# Patient Record
Sex: MALE | Race: WHITE | Employment: OTHER | URBAN - NONMETROPOLITAN AREA
[De-identification: names, ages, dates, MRNs, and addresses within clinical notes are randomized per-mention and may not be internally consistent; named-entity substitution may affect disease eponyms.]

---

## 2017-01-13 ENCOUNTER — OFFICE VISIT (OUTPATIENT)
Dept: BARIATRICS/WEIGHT MGMT | Age: 62
End: 2017-01-13

## 2017-01-13 VITALS
HEART RATE: 64 BPM | HEIGHT: 70 IN | BODY MASS INDEX: 45.1 KG/M2 | WEIGHT: 315 LBS | RESPIRATION RATE: 18 BRPM | SYSTOLIC BLOOD PRESSURE: 134 MMHG | DIASTOLIC BLOOD PRESSURE: 72 MMHG | TEMPERATURE: 97.9 F

## 2017-01-13 DIAGNOSIS — E78.00 HYPERCHOLESTEREMIA: ICD-10-CM

## 2017-01-13 DIAGNOSIS — I10 ESSENTIAL HYPERTENSION: ICD-10-CM

## 2017-01-13 DIAGNOSIS — G47.30 SLEEP APNEA, UNSPECIFIED TYPE: ICD-10-CM

## 2017-01-13 DIAGNOSIS — E11.9 TYPE 2 DIABETES MELLITUS WITHOUT COMPLICATION, UNSPECIFIED LONG TERM INSULIN USE STATUS: ICD-10-CM

## 2017-01-13 DIAGNOSIS — E66.01 MORBID OBESITY DUE TO EXCESS CALORIES (HCC): Primary | ICD-10-CM

## 2017-01-13 DIAGNOSIS — E66.01 MORBID OBESITY WITH BMI OF 45.0-49.9, ADULT (HCC): Primary | ICD-10-CM

## 2017-01-13 PROCEDURE — 99213 OFFICE O/P EST LOW 20 MIN: CPT | Performed by: SURGERY

## 2017-01-13 ASSESSMENT — ENCOUNTER SYMPTOMS
GASTROINTESTINAL NEGATIVE: 1
CHOKING: 0
APNEA: 1
EYES NEGATIVE: 1
CHEST TIGHTNESS: 0
ALLERGIC/IMMUNOLOGIC NEGATIVE: 1

## 2017-01-17 DIAGNOSIS — E78.5 HYPERLIPIDEMIA, UNSPECIFIED HYPERLIPIDEMIA TYPE: ICD-10-CM

## 2017-01-17 DIAGNOSIS — I10 ESSENTIAL HYPERTENSION: Primary | ICD-10-CM

## 2017-01-17 DIAGNOSIS — E66.01 MORBID OBESITY, UNSPECIFIED OBESITY TYPE (HCC): ICD-10-CM

## 2017-02-06 ENCOUNTER — OFFICE VISIT (OUTPATIENT)
Dept: BARIATRICS/WEIGHT MGMT | Age: 62
End: 2017-02-06

## 2017-02-06 VITALS
HEIGHT: 71 IN | RESPIRATION RATE: 18 BRPM | TEMPERATURE: 98.1 F | DIASTOLIC BLOOD PRESSURE: 76 MMHG | HEART RATE: 72 BPM | BODY MASS INDEX: 44.1 KG/M2 | WEIGHT: 315 LBS | SYSTOLIC BLOOD PRESSURE: 140 MMHG

## 2017-02-06 DIAGNOSIS — E11.8 TYPE 2 DIABETES MELLITUS WITH COMPLICATION, UNSPECIFIED LONG TERM INSULIN USE STATUS: ICD-10-CM

## 2017-02-06 DIAGNOSIS — E66.9 OBESITY, UNSPECIFIED OBESITY SEVERITY, UNSPECIFIED OBESITY TYPE: Primary | ICD-10-CM

## 2017-02-06 DIAGNOSIS — R79.89 LOW VITAMIN D LEVEL: ICD-10-CM

## 2017-02-06 DIAGNOSIS — E66.01 MORBID OBESITY WITH BMI OF 50.0-59.9, ADULT (HCC): Primary | ICD-10-CM

## 2017-02-06 DIAGNOSIS — I10 ESSENTIAL HYPERTENSION: ICD-10-CM

## 2017-02-06 DIAGNOSIS — N18.9 CHRONIC KIDNEY DISEASE, UNSPECIFIED STAGE: ICD-10-CM

## 2017-02-06 DIAGNOSIS — E78.00 HYPERCHOLESTEREMIA: ICD-10-CM

## 2017-02-06 DIAGNOSIS — G47.30 SLEEP APNEA, UNSPECIFIED TYPE: ICD-10-CM

## 2017-02-06 PROCEDURE — 99213 OFFICE O/P EST LOW 20 MIN: CPT | Performed by: SURGERY

## 2017-02-06 RX ORDER — ONDANSETRON 4 MG/1
4 TABLET, FILM COATED ORAL EVERY 4 HOURS PRN
Qty: 30 TABLET | Refills: 2 | Status: ON HOLD | OUTPATIENT
Start: 2017-02-27 | End: 2020-09-08

## 2017-02-06 RX ORDER — OMEPRAZOLE 40 MG/1
40 CAPSULE, DELAYED RELEASE ORAL DAILY
Qty: 30 CAPSULE | Refills: 3 | Status: ON HOLD | OUTPATIENT
Start: 2017-02-27 | End: 2020-09-08

## 2017-02-06 RX ORDER — METOCLOPRAMIDE 10 MG/1
10 TABLET ORAL EVERY 6 HOURS PRN
Qty: 30 TABLET | Refills: 2 | Status: ON HOLD | OUTPATIENT
Start: 2017-02-27 | End: 2020-09-08

## 2017-02-06 ASSESSMENT — ENCOUNTER SYMPTOMS
EYES NEGATIVE: 1
GASTROINTESTINAL NEGATIVE: 1
APNEA: 1
CHEST TIGHTNESS: 0
CHOKING: 0
ALLERGIC/IMMUNOLOGIC NEGATIVE: 1

## 2017-02-07 ENCOUNTER — TELEPHONE (OUTPATIENT)
Age: 62
End: 2017-02-07

## 2017-02-07 RX ORDER — ERGOCALCIFEROL (VITAMIN D2) 1250 MCG
50000 CAPSULE ORAL WEEKLY
Qty: 8 CAPSULE | Refills: 0 | Status: ON HOLD | OUTPATIENT
Start: 2017-02-07 | End: 2020-09-08

## 2017-11-16 ENCOUNTER — HOSPITAL ENCOUNTER (OUTPATIENT)
Age: 62
Discharge: HOME | End: 2017-11-16
Payer: COMMERCIAL

## 2017-11-16 LAB
BACTERIA: ABNORMAL /HPF
BILIRUBIN URINE: NEGATIVE
BLOOD, URINE: ABNORMAL
CASTS 2: ABNORMAL /LPF
CASTS UA: ABNORMAL /LPF
CHARACTER, URINE: CLEAR
COLOR: YELLOW
CREATININE URINE: 16.7 MG/DL
CRYSTALS, UA: ABNORMAL
EPITHELIAL CELLS, UA: ABNORMAL /HPF
GLUCOSE URINE: NEGATIVE MG/DL
KETONES, URINE: NEGATIVE
LEUKOCYTE ESTERASE, URINE: NEGATIVE
MISCELLANEOUS 2: ABNORMAL
NITRITE, URINE: NEGATIVE
PH UA: 6.5
PROT/CREAT RATIO, UR: 5.98
PROTEIN UA: 100
PROTEIN, URINE: 99.9 MG/DL
RBC URINE: ABNORMAL /HPF
RENAL EPITHELIAL, UA: ABNORMAL
SPECIFIC GRAVITY, URINE: 1.01 (ref 1–1.03)
UROBILINOGEN, URINE: 0.2 EU/DL
WBC UA: ABNORMAL /HPF
YEAST: ABNORMAL

## 2017-11-16 PROCEDURE — 81001 URINALYSIS AUTO W/SCOPE: CPT

## 2017-11-16 PROCEDURE — 82570 ASSAY OF URINE CREATININE: CPT

## 2017-11-16 PROCEDURE — 84156 ASSAY OF PROTEIN URINE: CPT

## 2017-12-21 ENCOUNTER — HOSPITAL ENCOUNTER (OUTPATIENT)
Age: 62
Discharge: HOME | End: 2017-12-21
Payer: COMMERCIAL

## 2017-12-21 LAB
ALBUMIN SERPL-MCNC: 3.7 G/DL (ref 3.5–5.1)
ALP BLD-CCNC: 90 U/L (ref 38–126)
ALT SERPL-CCNC: 78 U/L (ref 11–66)
ANION GAP SERPL CALCULATED.3IONS-SCNC: 13 MEQ/L (ref 8–16)
AST SERPL-CCNC: 56 U/L (ref 5–40)
AVERAGE GLUCOSE: 135 MG/DL (ref 70–126)
BASOPHILS # BLD: 1 %
BASOPHILS ABSOLUTE: 0.1 THOU/MM3 (ref 0–0.1)
BILIRUB SERPL-MCNC: 0.4 MG/DL (ref 0.3–1.2)
BUN BLDV-MCNC: 22 MG/DL (ref 7–22)
CALCIUM SERPL-MCNC: 10.6 MG/DL (ref 8.5–10.5)
CHLORIDE BLD-SCNC: 103 MEQ/L (ref 98–111)
CHOLESTEROL, TOTAL: 162 MG/DL (ref 100–199)
CO2: 27 MEQ/L (ref 23–33)
CREAT SERPL-MCNC: 1 MG/DL (ref 0.4–1.2)
EOSINOPHIL # BLD: 6.5 %
EOSINOPHILS ABSOLUTE: 0.4 THOU/MM3 (ref 0–0.4)
GFR SERPL CREATININE-BSD FRML MDRD: 76 ML/MIN/1.73M2
GLUCOSE BLD-MCNC: 169 MG/DL (ref 70–108)
HBA1C MFR BLD: 6.5 % (ref 4.4–6.4)
HCT VFR BLD CALC: 48.8 % (ref 42–52)
HDLC SERPL-MCNC: 38 MG/DL
HEMOGLOBIN: 16.7 GM/DL (ref 14–18)
LDL CHOLESTEROL CALCULATED: 102 MG/DL
LYMPHOCYTES # BLD: 31.8 %
LYMPHOCYTES ABSOLUTE: 1.7 THOU/MM3 (ref 1–4.8)
MCH RBC QN AUTO: 30.1 PG (ref 27–31)
MCHC RBC AUTO-ENTMCNC: 34.3 GM/DL (ref 33–37)
MCV RBC AUTO: 87.9 FL (ref 80–94)
MONOCYTES # BLD: 13.7 %
MONOCYTES ABSOLUTE: 0.8 THOU/MM3 (ref 0.4–1.3)
NUCLEATED RED BLOOD CELLS: 0 /100 WBC
PDW BLD-RTO: 14 % (ref 11.5–14.5)
PLATELET # BLD: 190 THOU/MM3 (ref 130–400)
PMV BLD AUTO: 9.2 MCM (ref 7.4–10.4)
POTASSIUM SERPL-SCNC: 4.4 MEQ/L (ref 3.5–5.2)
PROSTATE SPECIFIC ANTIGEN: 0.61 NG/ML (ref 0–1)
RBC # BLD: 5.55 MILL/MM3 (ref 4.7–6.1)
SEG NEUTROPHILS: 47 %
SEGMENTED NEUTROPHILS ABSOLUTE COUNT: 2.6 THOU/MM3 (ref 1.8–7.7)
SODIUM BLD-SCNC: 143 MEQ/L (ref 135–145)
TOTAL PROTEIN: 6.9 G/DL (ref 6.1–8)
TRIGL SERPL-MCNC: 110 MG/DL (ref 0–199)
WBC # BLD: 5.5 THOU/MM3 (ref 4.8–10.8)

## 2017-12-21 PROCEDURE — G0103 PSA SCREENING: HCPCS

## 2017-12-21 PROCEDURE — 85025 COMPLETE CBC W/AUTO DIFF WBC: CPT

## 2017-12-21 PROCEDURE — 80061 LIPID PANEL: CPT

## 2017-12-21 PROCEDURE — 80053 COMPREHEN METABOLIC PANEL: CPT

## 2017-12-21 PROCEDURE — 83036 HEMOGLOBIN GLYCOSYLATED A1C: CPT

## 2017-12-21 PROCEDURE — 36415 COLL VENOUS BLD VENIPUNCTURE: CPT

## 2018-02-06 ENCOUNTER — HOSPITAL ENCOUNTER (OUTPATIENT)
Age: 63
Discharge: HOME | End: 2018-02-06
Payer: COMMERCIAL

## 2018-02-06 LAB
ANION GAP SERPL CALCULATED.3IONS-SCNC: 14 MEQ/L (ref 8–16)
BACTERIA: ABNORMAL /HPF
BILIRUBIN URINE: NEGATIVE
BLOOD, URINE: ABNORMAL
BUN BLDV-MCNC: 20 MG/DL (ref 7–22)
CALCIUM SERPL-MCNC: 10.4 MG/DL (ref 8.5–10.5)
CASTS 2: ABNORMAL /LPF
CASTS UA: ABNORMAL /LPF
CHARACTER, URINE: CLEAR
CHLORIDE BLD-SCNC: 100 MEQ/L (ref 98–111)
CO2: 27 MEQ/L (ref 23–33)
COLOR: YELLOW
CREAT SERPL-MCNC: 1 MG/DL (ref 0.4–1.2)
CREATININE URINE: 24.3 MG/DL
CRYSTALS, UA: ABNORMAL
EPITHELIAL CELLS, UA: ABNORMAL /HPF
GFR SERPL CREATININE-BSD FRML MDRD: 76 ML/MIN/1.73M2
GLUCOSE BLD-MCNC: 174 MG/DL (ref 70–108)
GLUCOSE URINE: NEGATIVE MG/DL
HCT VFR BLD CALC: 47.5 % (ref 42–52)
HEMOGLOBIN: 16.3 GM/DL (ref 14–18)
KETONES, URINE: NEGATIVE
LEUKOCYTE ESTERASE, URINE: NEGATIVE
MCH RBC QN AUTO: 30.2 PG (ref 27–31)
MCHC RBC AUTO-ENTMCNC: 34.3 GM/DL (ref 33–37)
MCV RBC AUTO: 88.3 FL (ref 80–94)
MISCELLANEOUS 2: ABNORMAL
NITRITE, URINE: NEGATIVE
PDW BLD-RTO: 13.7 % (ref 11.5–14.5)
PH UA: 6
PLATELET # BLD: 219 THOU/MM3 (ref 130–400)
PMV BLD AUTO: 9.4 FL (ref 7.4–10.4)
POTASSIUM SERPL-SCNC: 4.4 MEQ/L (ref 3.5–5.2)
PROT/CREAT RATIO, UR: 2.92
PROTEIN UA: 100
PROTEIN, URINE: 70.9 MG/DL
RBC # BLD: 5.38 MILL/MM3 (ref 4.7–6.1)
RBC URINE: ABNORMAL /HPF
RENAL EPITHELIAL, UA: ABNORMAL
SODIUM BLD-SCNC: 141 MEQ/L (ref 135–145)
SPECIFIC GRAVITY, URINE: 1.01 (ref 1–1.03)
UROBILINOGEN, URINE: 0.2 EU/DL
WBC # BLD: 8.9 THOU/MM3 (ref 4.8–10.8)
WBC UA: ABNORMAL /HPF
YEAST: ABNORMAL

## 2018-02-06 PROCEDURE — 81001 URINALYSIS AUTO W/SCOPE: CPT

## 2018-02-06 PROCEDURE — 80048 BASIC METABOLIC PNL TOTAL CA: CPT

## 2018-02-06 PROCEDURE — 85027 COMPLETE CBC AUTOMATED: CPT

## 2018-02-06 PROCEDURE — 84156 ASSAY OF PROTEIN URINE: CPT

## 2018-02-06 PROCEDURE — 36415 COLL VENOUS BLD VENIPUNCTURE: CPT

## 2018-02-06 PROCEDURE — 82570 ASSAY OF URINE CREATININE: CPT

## 2018-03-20 ENCOUNTER — HOSPITAL ENCOUNTER (OUTPATIENT)
Age: 63
Discharge: HOME | End: 2018-03-20
Payer: COMMERCIAL

## 2018-03-20 LAB
ANION GAP SERPL CALCULATED.3IONS-SCNC: 14 MEQ/L (ref 8–16)
BACTERIA: ABNORMAL /HPF
BASOPHILS # BLD: 1.3 %
BASOPHILS ABSOLUTE: 0.1 THOU/MM3 (ref 0–0.1)
BILIRUBIN URINE: NEGATIVE
BLOOD, URINE: ABNORMAL
BUN BLDV-MCNC: 24 MG/DL (ref 7–22)
CALCIUM SERPL-MCNC: 10.9 MG/DL (ref 8.5–10.5)
CASTS 2: ABNORMAL /LPF
CASTS UA: ABNORMAL /LPF
CHARACTER, URINE: CLEAR
CHLORIDE BLD-SCNC: 101 MEQ/L (ref 98–111)
CO2: 27 MEQ/L (ref 23–33)
COLOR: YELLOW
CREAT SERPL-MCNC: 0.9 MG/DL (ref 0.4–1.2)
CREATININE URINE: 18.7 MG/DL
CRYSTALS, UA: ABNORMAL
EOSINOPHIL # BLD: 3.9 %
EOSINOPHILS ABSOLUTE: 0.3 THOU/MM3 (ref 0–0.4)
EPITHELIAL CELLS, UA: ABNORMAL /HPF
GFR SERPL CREATININE-BSD FRML MDRD: 86 ML/MIN/1.73M2
GLUCOSE BLD-MCNC: 163 MG/DL (ref 70–108)
GLUCOSE URINE: NEGATIVE MG/DL
HCT VFR BLD CALC: 49.3 % (ref 42–52)
HEMOGLOBIN: 16.9 GM/DL (ref 14–18)
KETONES, URINE: NEGATIVE
LEUKOCYTE ESTERASE, URINE: NEGATIVE
LYMPHOCYTES # BLD: 24.3 %
LYMPHOCYTES ABSOLUTE: 2.1 THOU/MM3 (ref 1–4.8)
MCH RBC QN AUTO: 30 PG (ref 27–31)
MCHC RBC AUTO-ENTMCNC: 34.3 GM/DL (ref 33–37)
MCV RBC AUTO: 87.3 FL (ref 80–94)
MISCELLANEOUS 2: ABNORMAL
MONOCYTES # BLD: 12.3 %
MONOCYTES ABSOLUTE: 1.1 THOU/MM3 (ref 0.4–1.3)
NITRITE, URINE: NEGATIVE
NUCLEATED RED BLOOD CELLS: 0 /100 WBC
PDW BLD-RTO: 13.8 % (ref 11.5–14.5)
PH UA: 6.5
PLATELET # BLD: 236 THOU/MM3 (ref 130–400)
PMV BLD AUTO: 9.6 FL (ref 7.4–10.4)
POTASSIUM SERPL-SCNC: 4.3 MEQ/L (ref 3.5–5.2)
PROT/CREAT RATIO, UR: 3.03
PROTEIN UA: 100
PROTEIN, URINE: 56.6 MG/DL
RBC # BLD: 5.65 MILL/MM3 (ref 4.7–6.1)
RBC URINE: ABNORMAL /HPF
RENAL EPITHELIAL, UA: ABNORMAL
SEG NEUTROPHILS: 58.2 %
SEGMENTED NEUTROPHILS ABSOLUTE COUNT: 5.1 THOU/MM3 (ref 1.8–7.7)
SODIUM BLD-SCNC: 142 MEQ/L (ref 135–145)
SPECIFIC GRAVITY, URINE: 1.01 (ref 1–1.03)
UROBILINOGEN, URINE: 0.2 EU/DL
WBC # BLD: 8.7 THOU/MM3 (ref 4.8–10.8)
WBC UA: ABNORMAL /HPF
YEAST: ABNORMAL

## 2018-03-20 PROCEDURE — 36415 COLL VENOUS BLD VENIPUNCTURE: CPT

## 2018-03-20 PROCEDURE — 81001 URINALYSIS AUTO W/SCOPE: CPT

## 2018-03-20 PROCEDURE — 85025 COMPLETE CBC W/AUTO DIFF WBC: CPT

## 2018-03-20 PROCEDURE — 84156 ASSAY OF PROTEIN URINE: CPT

## 2018-03-20 PROCEDURE — 82570 ASSAY OF URINE CREATININE: CPT

## 2018-03-20 PROCEDURE — 80048 BASIC METABOLIC PNL TOTAL CA: CPT

## 2019-03-26 ENCOUNTER — HOSPITAL ENCOUNTER (OUTPATIENT)
Age: 64
Discharge: HOME | End: 2019-03-26
Payer: COMMERCIAL

## 2019-03-26 LAB
ANION GAP SERPL CALCULATED.3IONS-SCNC: 11 MEQ/L (ref 8–16)
BACTERIA: ABNORMAL /HPF
BASOPHILS # BLD: 0.7 %
BASOPHILS ABSOLUTE: 0.1 THOU/MM3 (ref 0–0.1)
BILIRUBIN URINE: NEGATIVE
BLOOD, URINE: NEGATIVE
BUN BLDV-MCNC: 23 MG/DL (ref 7–22)
CALCIUM SERPL-MCNC: 10.5 MG/DL (ref 8.5–10.5)
CASTS 2: ABNORMAL /LPF
CASTS UA: ABNORMAL /LPF
CHARACTER, URINE: CLEAR
CHLORIDE BLD-SCNC: 104 MEQ/L (ref 98–111)
CO2: 27 MEQ/L (ref 23–33)
COLOR: YELLOW
CREAT SERPL-MCNC: 0.9 MG/DL (ref 0.4–1.2)
CRYSTALS, UA: ABNORMAL
EOSINOPHIL # BLD: 3.7 %
EOSINOPHILS ABSOLUTE: 0.3 THOU/MM3 (ref 0–0.4)
EPITHELIAL CELLS, UA: ABNORMAL /HPF
ERYTHROCYTE [DISTWIDTH] IN BLOOD BY AUTOMATED COUNT: 13.6 % (ref 11.5–14.5)
ERYTHROCYTE [DISTWIDTH] IN BLOOD BY AUTOMATED COUNT: 43.8 FL (ref 35–45)
GFR SERPL CREATININE-BSD FRML MDRD: 85 ML/MIN/1.73M2
GLUCOSE BLD-MCNC: 156 MG/DL (ref 70–108)
GLUCOSE URINE: NEGATIVE MG/DL
HCT VFR BLD CALC: 50.9 % (ref 42–52)
HEMOGLOBIN: 16.8 GM/DL (ref 14–18)
IMMATURE GRANS (ABS): 0.12 THOU/MM3 (ref 0–0.07)
IMMATURE GRANULOCYTES: 1.3 %
KETONES, URINE: NEGATIVE
LEUKOCYTE ESTERASE, URINE: NEGATIVE
LYMPHOCYTES # BLD: 25.1 %
LYMPHOCYTES ABSOLUTE: 2.4 THOU/MM3 (ref 1–4.8)
MCH RBC QN AUTO: 29.4 PG (ref 26–33)
MCHC RBC AUTO-ENTMCNC: 33 GM/DL (ref 32.2–35.5)
MCV RBC AUTO: 89.1 FL (ref 80–94)
MISCELLANEOUS 2: ABNORMAL
MONOCYTES # BLD: 9.4 %
MONOCYTES ABSOLUTE: 0.9 THOU/MM3 (ref 0.4–1.3)
NITRITE, URINE: NEGATIVE
NUCLEATED RED BLOOD CELLS: 0 /100 WBC
PH UA: 6 (ref 5–9)
PLATELET # BLD: 277 THOU/MM3 (ref 130–400)
PMV BLD AUTO: 10.8 FL (ref 9.4–12.4)
POTASSIUM SERPL-SCNC: 4.4 MEQ/L (ref 3.5–5.2)
PROTEIN UA: 100
RBC # BLD: 5.71 MILL/MM3 (ref 4.7–6.1)
RBC URINE: ABNORMAL /HPF
RENAL EPITHELIAL, UA: ABNORMAL
SEG NEUTROPHILS: 59.8 %
SEGMENTED NEUTROPHILS ABSOLUTE COUNT: 5.6 THOU/MM3 (ref 1.8–7.7)
SODIUM BLD-SCNC: 142 MEQ/L (ref 135–145)
SPECIFIC GRAVITY, URINE: 1.01 (ref 1–1.03)
UROBILINOGEN, URINE: 0.2 EU/DL (ref 0–1)
WBC # BLD: 9.4 THOU/MM3 (ref 4.8–10.8)
WBC UA: ABNORMAL /HPF
YEAST: ABNORMAL

## 2019-03-26 PROCEDURE — 82570 ASSAY OF URINE CREATININE: CPT

## 2019-03-26 PROCEDURE — 80048 BASIC METABOLIC PNL TOTAL CA: CPT

## 2019-03-26 PROCEDURE — 84156 ASSAY OF PROTEIN URINE: CPT

## 2019-03-26 PROCEDURE — 85025 COMPLETE CBC W/AUTO DIFF WBC: CPT

## 2019-03-26 PROCEDURE — 36415 COLL VENOUS BLD VENIPUNCTURE: CPT

## 2019-03-26 PROCEDURE — 81001 URINALYSIS AUTO W/SCOPE: CPT

## 2019-03-27 LAB
CREATININE URINE: 29.2 MG/DL
PROT/CREAT RATIO, UR: 2.49
PROTEIN, URINE: 72.7 MG/DL

## 2019-05-02 ENCOUNTER — HOSPITAL ENCOUNTER (OUTPATIENT)
Age: 64
Discharge: HOME | End: 2019-05-02
Payer: COMMERCIAL

## 2019-05-02 LAB — URIC ACID: 9.2 MG/DL (ref 3.7–7)

## 2019-05-02 PROCEDURE — 84550 ASSAY OF BLOOD/URIC ACID: CPT

## 2019-05-02 PROCEDURE — 36415 COLL VENOUS BLD VENIPUNCTURE: CPT

## 2019-08-10 ENCOUNTER — APPOINTMENT (OUTPATIENT)
Dept: GENERAL RADIOLOGY | Age: 64
End: 2019-08-10
Payer: COMMERCIAL

## 2019-08-10 ENCOUNTER — HOSPITAL ENCOUNTER (EMERGENCY)
Age: 64
Discharge: HOME | End: 2019-08-10
Attending: EMERGENCY MEDICINE
Payer: COMMERCIAL

## 2019-08-10 VITALS
SYSTOLIC BLOOD PRESSURE: 183 MMHG | WEIGHT: 315 LBS | HEIGHT: 71 IN | RESPIRATION RATE: 20 BRPM | OXYGEN SATURATION: 92 % | TEMPERATURE: 97.7 F | BODY MASS INDEX: 44.1 KG/M2 | DIASTOLIC BLOOD PRESSURE: 96 MMHG | HEART RATE: 74 BPM

## 2019-08-10 DIAGNOSIS — B34.9 VIRAL ILLNESS: Primary | ICD-10-CM

## 2019-08-10 DIAGNOSIS — R03.0 ELEVATED BLOOD PRESSURE READING: ICD-10-CM

## 2019-08-10 LAB
ALBUMIN SERPL-MCNC: 4 G/DL (ref 3.5–5.1)
ALP BLD-CCNC: 98 U/L (ref 38–126)
ALT SERPL-CCNC: 60 U/L (ref 11–66)
ANION GAP SERPL CALCULATED.3IONS-SCNC: 12 MEQ/L (ref 8–16)
APTT: 35.6 SECONDS (ref 22–38)
AST SERPL-CCNC: 54 U/L (ref 5–40)
BACTERIA: ABNORMAL /HPF
BASOPHILS # BLD: 0.6 %
BASOPHILS ABSOLUTE: 0 THOU/MM3 (ref 0–0.1)
BILIRUB SERPL-MCNC: 0.7 MG/DL (ref 0.3–1.2)
BILIRUBIN URINE: NEGATIVE
BLOOD, URINE: ABNORMAL
BUN BLDV-MCNC: 17 MG/DL (ref 7–22)
C-REACTIVE PROTEIN: 2.77 MG/DL (ref 0–1)
CALCIUM SERPL-MCNC: 10.9 MG/DL (ref 8.5–10.5)
CASTS 2: ABNORMAL /LPF
CASTS UA: ABNORMAL /LPF
CHARACTER, URINE: CLEAR
CHLORIDE BLD-SCNC: 100 MEQ/L (ref 98–111)
CO2: 24 MEQ/L (ref 23–33)
COLOR: YELLOW
CREAT SERPL-MCNC: 1 MG/DL (ref 0.4–1.2)
CRYSTALS, UA: ABNORMAL
EOSINOPHIL # BLD: 0.8 %
EOSINOPHILS ABSOLUTE: 0.1 THOU/MM3 (ref 0–0.4)
EPITHELIAL CELLS, UA: ABNORMAL /HPF
ERYTHROCYTE [DISTWIDTH] IN BLOOD BY AUTOMATED COUNT: 13.1 % (ref 11.5–14.5)
ERYTHROCYTE [DISTWIDTH] IN BLOOD BY AUTOMATED COUNT: 41.3 FL (ref 35–45)
GFR SERPL CREATININE-BSD FRML MDRD: 76 ML/MIN/1.73M2
GLUCOSE BLD-MCNC: 194 MG/DL (ref 70–108)
GLUCOSE URINE: NEGATIVE MG/DL
HCT VFR BLD CALC: 46.5 % (ref 42–52)
HEMOGLOBIN: 16 GM/DL (ref 14–18)
IMMATURE GRANS (ABS): 0.08 THOU/MM3 (ref 0–0.07)
IMMATURE GRANULOCYTES: 1.2 %
INR BLD: 0.98 (ref 0.85–1.13)
KETONES, URINE: NEGATIVE
LEUKOCYTE ESTERASE, URINE: NEGATIVE
LYMPHOCYTES # BLD: 18 %
LYMPHOCYTES ABSOLUTE: 1.2 THOU/MM3 (ref 1–4.8)
MCH RBC QN AUTO: 29.9 PG (ref 26–33)
MCHC RBC AUTO-ENTMCNC: 34.4 GM/DL (ref 32.2–35.5)
MCV RBC AUTO: 86.9 FL (ref 80–94)
MISCELLANEOUS 2: ABNORMAL
MONOCYTES # BLD: 13.8 %
MONOCYTES ABSOLUTE: 0.9 THOU/MM3 (ref 0.4–1.3)
NITRITE, URINE: NEGATIVE
NUCLEATED RED BLOOD CELLS: 0 /100 WBC
OSMOLALITY CALCULATION: 278.8 MOSMOL/KG (ref 275–300)
PH UA: 6.5 (ref 5–9)
PLATELET # BLD: 166 THOU/MM3 (ref 130–400)
PMV BLD AUTO: 10 FL (ref 9.4–12.4)
POTASSIUM SERPL-SCNC: 4.1 MEQ/L (ref 3.5–5.2)
PRO-BNP: 90.6 PG/ML (ref 0–900)
PROCALCITONIN: 0.54 NG/ML (ref 0.01–0.09)
PROTEIN UA: 300
RBC # BLD: 5.35 MILL/MM3 (ref 4.7–6.1)
RBC URINE: ABNORMAL /HPF
RENAL EPITHELIAL, UA: ABNORMAL
SEDIMENTATION RATE, ERYTHROCYTE: 6 MM/HR (ref 0–10)
SEG NEUTROPHILS: 65.6 %
SEGMENTED NEUTROPHILS ABSOLUTE COUNT: 4.3 THOU/MM3 (ref 1.8–7.7)
SODIUM BLD-SCNC: 136 MEQ/L (ref 135–145)
SPECIFIC GRAVITY, URINE: 1.02 (ref 1–1.03)
TOTAL CK: 189 U/L (ref 55–170)
TOTAL PROTEIN: 6.3 G/DL (ref 6.1–8)
TROPONIN T: < 0.01 NG/ML
UROBILINOGEN, URINE: 0.2 EU/DL (ref 0–1)
WBC # BLD: 6.5 THOU/MM3 (ref 4.8–10.8)
WBC UA: ABNORMAL /HPF
YEAST: ABNORMAL

## 2019-08-10 PROCEDURE — 86140 C-REACTIVE PROTEIN: CPT

## 2019-08-10 PROCEDURE — 84145 PROCALCITONIN (PCT): CPT

## 2019-08-10 PROCEDURE — 85730 THROMBOPLASTIN TIME PARTIAL: CPT

## 2019-08-10 PROCEDURE — 81001 URINALYSIS AUTO W/SCOPE: CPT

## 2019-08-10 PROCEDURE — 6370000000 HC RX 637 (ALT 250 FOR IP): Performed by: EMERGENCY MEDICINE

## 2019-08-10 PROCEDURE — 71046 X-RAY EXAM CHEST 2 VIEWS: CPT

## 2019-08-10 PROCEDURE — 80053 COMPREHEN METABOLIC PANEL: CPT

## 2019-08-10 PROCEDURE — 84484 ASSAY OF TROPONIN QUANT: CPT

## 2019-08-10 PROCEDURE — 94640 AIRWAY INHALATION TREATMENT: CPT

## 2019-08-10 PROCEDURE — 82550 ASSAY OF CK (CPK): CPT

## 2019-08-10 PROCEDURE — 87040 BLOOD CULTURE FOR BACTERIA: CPT

## 2019-08-10 PROCEDURE — 85651 RBC SED RATE NONAUTOMATED: CPT

## 2019-08-10 PROCEDURE — 85610 PROTHROMBIN TIME: CPT

## 2019-08-10 PROCEDURE — 83880 ASSAY OF NATRIURETIC PEPTIDE: CPT

## 2019-08-10 PROCEDURE — 85025 COMPLETE CBC W/AUTO DIFF WBC: CPT

## 2019-08-10 PROCEDURE — 99284 EMERGENCY DEPT VISIT MOD MDM: CPT

## 2019-08-10 PROCEDURE — 36415 COLL VENOUS BLD VENIPUNCTURE: CPT

## 2019-08-10 PROCEDURE — 2709999900 HC NON-CHARGEABLE SUPPLY

## 2019-08-10 RX ORDER — IPRATROPIUM BROMIDE AND ALBUTEROL SULFATE 2.5; .5 MG/3ML; MG/3ML
1 SOLUTION RESPIRATORY (INHALATION) ONCE
Status: COMPLETED | OUTPATIENT
Start: 2019-08-10 | End: 2019-08-10

## 2019-08-10 RX ADMIN — IPRATROPIUM BROMIDE AND ALBUTEROL SULFATE 1 AMPULE: .5; 3 SOLUTION RESPIRATORY (INHALATION) at 04:01

## 2019-08-10 ASSESSMENT — ENCOUNTER SYMPTOMS
COUGH: 0
VOMITING: 0
WHEEZING: 0
EYE DISCHARGE: 0
DIARRHEA: 0
BACK PAIN: 1
SORE THROAT: 0
ABDOMINAL PAIN: 0
SHORTNESS OF BREATH: 0
NAUSEA: 0
EYE REDNESS: 0
RHINORRHEA: 0

## 2019-08-10 ASSESSMENT — PAIN DESCRIPTION - PAIN TYPE: TYPE: ACUTE PAIN

## 2019-08-10 ASSESSMENT — PAIN DESCRIPTION - LOCATION: LOCATION: BACK;NECK

## 2019-08-10 ASSESSMENT — PAIN DESCRIPTION - ORIENTATION: ORIENTATION: MID

## 2019-08-10 ASSESSMENT — PAIN SCALES - GENERAL: PAINLEVEL_OUTOF10: 3

## 2019-08-10 NOTE — ED NOTES
Pt. Resting in bed with even and unlabored respirations. Pt. States pain is a 2/10 in his back at this time. Pt. States the breathing treatment has helped. Pt. Updated about plan of care and treatment. Family at bedside. Pt. Has no further concerns, questions or needs at this time. Call light within reach.         Pasha Valadez RN  08/10/19 1305

## 2019-08-10 NOTE — ED PROVIDER NOTES
[facility]  eMERGENCY dEPARTMENT eNCOUnter          CHIEF COMPLAINT       Chief Complaint   Patient presents with    Fatigue    Headache    Back Pain       Nurses Notes reviewed and I agreeexcept as noted in the HPI. HISTORY OF PRESENT ILLNESS    Laila Shown is a 59 y.o. male who presents to Emergency Department with multiple complaints. The patient has a history of CAD, diabetes, hypertension, hyperlipidemia, CHF, CKD, sleep apnea, and morbid obesity. Patient has experienced headache and neck pain for the past 3 weeks. He denies head or neck injury. Over the past 24 hours, patient has developed chest pain, lower back pain, hot flashes, and chills which prompted his visit here. He denies shortness of breath. Patient does not appear to be in any distress at this time. He denies nausea, vomiting, or abdominal pain. There are no other complaints or symptoms. REVIEW OF SYSTEMS     Review of Systems   Constitutional: Positive for chills. Negative for appetite change, fatigue and fever. HENT: Negative for congestion, ear pain, rhinorrhea and sore throat. Eyes: Negative for discharge, redness and visual disturbance. Respiratory: Negative for cough, shortness of breath and wheezing. Cardiovascular: Positive for chest pain. Negative for palpitations and leg swelling. Gastrointestinal: Negative for abdominal pain, diarrhea, nausea and vomiting. Genitourinary: Negative for decreased urine volume, difficulty urinating, dysuria and hematuria. Musculoskeletal: Positive for back pain and neck pain. Negative for arthralgias and joint swelling. Skin: Negative for pallor and rash. Allergic/Immunologic: Negative for environmental allergies. Neurological: Negative for dizziness, syncope, weakness, light-headedness, numbness and headaches. Hematological: Negative for adenopathy. Psychiatric/Behavioral: Negative for confusion and suicidal ideas.  The patient is not nervous/anxious. PAST MEDICAL HISTORY    has a past medical history of Arthritis, CAD (coronary artery disease), CHF (congestive heart failure) ([facility] [address] Chronic kidney disease, Diabetes mellitus ([facility] [address] Hyperlipidemia, Hypertension, Lower extremity edema, Membranous nephropathy determined by biopsy, Movement disorder, Obesity, Obstructive sleep apnea, Pneumonia, and SOB (shortness of breath). SURGICAL HISTORY      has a past surgical history that includes Cholecystectomy (1980'S); Umbilical hernia repair (10/04/2016); Colonoscopy (2016); joint replacement (Bilateral, 06/03/2014); eye surgery (Left, 12/2016); and knee surgery. CURRENT MEDICATIONS       Discharge Medication List as of 8/10/2019  5:46 AM      CONTINUE these medications which have NOT CHANGED    Details   ergocalciferol (DRISDOL) 36679 UNITS capsule Take 1 capsule by mouth once a week for 8 doses, Disp-8 capsule, R-0      glipiZIDE (GLUCOTROL) 5 MG tablet Take 10 mg by mouth 2 times daily (before meals)      metoclopramide (REGLAN) 10 MG tablet Take 1 tablet by mouth every 6 hours as needed (nausea/ bloating), Disp-30 tablet, R-2      ondansetron (ZOFRAN) 4 MG tablet Take 1 tablet by mouth every 4 hours as needed for Nausea or Vomiting, Disp-30 tablet, R-2      omeprazole (PRILOSEC) 40 MG delayed release capsule Take 1 capsule by mouth daily Open capsule and take in liquid, Disp-30 capsule, R-3      aspirin 81 MG chewable tablet Take 81 mg by mouth daily      pravastatin (PRAVACHOL) 10 MG tablet Take 10 mg by mouth nightly. bumetanide (BUMEX) 2 MG tablet Take 2 mg by mouth 2 times daily. losartan (COZAAR) 100 MG tablet Take 100 mg by mouth daily. potassium chloride SA (K-DUR;KLOR-CON) 20 MEQ tablet 2 tabs po twice daily      metoprolol (LOPRESSOR) 25 MG tablet Take 25 mg by mouth 2 times daily.                ALLERGIES     is allergic to bactrim [sulfamethoxazole-trimethoprim]; celebrex [celecoxib]; diclofenac; dolobid Casts UA 0-4 HYALINE NONE SEEN /lpf    Crystals NONE SEEN NONE SEEN    Renal Epithelial, Urine NONE SEEN NONE SEEN    Yeast, UA NONE SEEN NONE SEEN    CASTS 2 NONE SEEN NONE SEEN /lpf    MISCELLANEOUS 2 NONE SEEN        EMERGENCY DEPARTMENT COURSE:   Vitals:    Vitals:    08/10/19 0309 08/10/19 0313 08/10/19 0401 08/10/19 0437   BP:  (!) 184/94  (!) 183/96   Pulse: 73   74   Resp: 18   20   Temp: 97.7 °F (36.5 °C)      TempSrc: Oral      SpO2: 94%  94% 92%   Weight: (!) 380 lb (172.4 kg)      Height: 5' 11\" (1.803 m)        0322     Labs and imaging ordered. Duoneb ordered    MedicalDecision Making    He has wheezing. He is given Duoneb. Labs are reviewed, other than CRP 2.77, otherwise reassuring including normal WBC and negative Troponin, Procalcitonin 0.54. Chest X-ray has no acute changes. His history, exam and ED workups are consistent with viral illness and he seems at recovery stage. I suggest supportive care with home Albuterol (He has Albuterol Neb at home). Discharged with PCP follow up in one week. CRITICAL CARE:   None     CONSULTS:  None    PROCEDURES:  None    FINAL IMPRESSION      1. Viral illness    2. Elevated blood pressure reading          DISPOSITION/PLAN   Home    PATIENT REFERRED TO:  Blank Villagran MD  [address] [place] [address]  [phone]    In 1 week        DISCHARGE MEDICATIONS:  Discharge Medication List as of 8/10/2019  5:46 AM          (Please note that portions of this note were completed with a voice recognition program.  Efforts were made to edit the dictations but occasionally words aremis-transcribed.)    Scribe:  Marcella Blunt 8/10/19 3:38 AM Scribing for and in the presence of No att. providers found  '.     Signed by: Karolyn Murphy, 08/10/19 6:51 PM    Provider:  I personally performed the services described in the documentation, reviewed and edited the documentation which was dictated to the scribe in my presence, and it accurately

## 2019-08-15 LAB — BLOOD CULTURE, ROUTINE: NORMAL

## 2019-10-15 ENCOUNTER — HOSPITAL ENCOUNTER (OUTPATIENT)
Age: 64
Discharge: HOME | End: 2019-10-15
Payer: COMMERCIAL

## 2019-10-15 LAB
ALT SERPL-CCNC: 56 U/L (ref 11–66)
AST SERPL-CCNC: 37 U/L (ref 5–40)
URIC ACID: 7 MG/DL (ref 3.7–7)

## 2019-10-15 PROCEDURE — 84450 TRANSFERASE (AST) (SGOT): CPT

## 2019-10-15 PROCEDURE — 84550 ASSAY OF BLOOD/URIC ACID: CPT

## 2019-10-15 PROCEDURE — 36415 COLL VENOUS BLD VENIPUNCTURE: CPT

## 2019-10-15 PROCEDURE — 84460 ALANINE AMINO (ALT) (SGPT): CPT

## 2019-12-06 ENCOUNTER — HOSPITAL ENCOUNTER (OUTPATIENT)
Age: 64
Discharge: HOME | End: 2019-12-06
Payer: COMMERCIAL

## 2019-12-06 LAB
ALBUMIN SERPL-MCNC: 4.1 G/DL (ref 3.5–5.1)
ALP BLD-CCNC: 87 U/L (ref 38–126)
ALT SERPL-CCNC: 70 U/L (ref 11–66)
ANION GAP SERPL CALCULATED.3IONS-SCNC: 16 MEQ/L (ref 8–16)
AST SERPL-CCNC: 62 U/L (ref 5–40)
AVERAGE GLUCOSE: 147 MG/DL (ref 70–126)
BACTERIA: ABNORMAL /HPF
BASOPHILS # BLD: 1 %
BASOPHILS ABSOLUTE: 0.1 THOU/MM3 (ref 0–0.1)
BILIRUB SERPL-MCNC: 0.7 MG/DL (ref 0.3–1.2)
BILIRUBIN URINE: NEGATIVE
BLOOD, URINE: NEGATIVE
BUN BLDV-MCNC: 22 MG/DL (ref 7–22)
CALCIUM SERPL-MCNC: 11 MG/DL (ref 8.5–10.5)
CASTS 2: ABNORMAL /LPF
CASTS UA: ABNORMAL /LPF
CHARACTER, URINE: CLEAR
CHLORIDE BLD-SCNC: 101 MEQ/L (ref 98–111)
CHOLESTEROL, TOTAL: 192 MG/DL (ref 100–199)
CO2: 24 MEQ/L (ref 23–33)
COLOR: YELLOW
CREAT SERPL-MCNC: 0.9 MG/DL (ref 0.4–1.2)
CREATININE URINE: < 4.2 MG/DL
CRYSTALS, UA: ABNORMAL
EOSINOPHIL # BLD: 3.8 %
EOSINOPHILS ABSOLUTE: 0.3 THOU/MM3 (ref 0–0.4)
EPITHELIAL CELLS, UA: ABNORMAL /HPF
ERYTHROCYTE [DISTWIDTH] IN BLOOD BY AUTOMATED COUNT: 13 % (ref 11.5–14.5)
ERYTHROCYTE [DISTWIDTH] IN BLOOD BY AUTOMATED COUNT: 42.4 FL (ref 35–45)
GFR SERPL CREATININE-BSD FRML MDRD: 85 ML/MIN/1.73M2
GLUCOSE BLD-MCNC: 170 MG/DL (ref 70–108)
GLUCOSE URINE: NEGATIVE MG/DL
HBA1C MFR BLD: 6.9 % (ref 4.4–6.4)
HCT VFR BLD CALC: 50.3 % (ref 42–52)
HDLC SERPL-MCNC: 47 MG/DL
HEMOGLOBIN: 16.5 GM/DL (ref 14–18)
IMMATURE GRANS (ABS): 0.07 THOU/MM3 (ref 0–0.07)
IMMATURE GRANULOCYTES: 0.8 %
KETONES, URINE: NEGATIVE
LDL CHOLESTEROL CALCULATED: 112 MG/DL
LEUKOCYTE ESTERASE, URINE: NEGATIVE
LYMPHOCYTES # BLD: 22.4 %
LYMPHOCYTES ABSOLUTE: 1.9 THOU/MM3 (ref 1–4.8)
MCH RBC QN AUTO: 29.3 PG (ref 26–33)
MCHC RBC AUTO-ENTMCNC: 32.8 GM/DL (ref 32.2–35.5)
MCV RBC AUTO: 89.3 FL (ref 80–94)
MISCELLANEOUS 2: ABNORMAL
MONOCYTES # BLD: 8.7 %
MONOCYTES ABSOLUTE: 0.8 THOU/MM3 (ref 0.4–1.3)
NITRITE, URINE: NEGATIVE
NUCLEATED RED BLOOD CELLS: 0 /100 WBC
PH UA: 6.5 (ref 5–9)
PLATELET # BLD: 229 THOU/MM3 (ref 130–400)
PMV BLD AUTO: 10.5 FL (ref 9.4–12.4)
POTASSIUM SERPL-SCNC: 4.4 MEQ/L (ref 3.5–5.2)
PROSTATE SPECIFIC ANTIGEN: 0.58 NG/ML (ref 0–1)
PROT/CREAT RATIO, UR: 0.95
PROTEIN UA: 100
PROTEIN, URINE: < 4 MG/DL
RBC # BLD: 5.63 MILL/MM3 (ref 4.7–6.1)
RBC URINE: ABNORMAL /HPF
RENAL EPITHELIAL, UA: ABNORMAL
SEG NEUTROPHILS: 63.3 %
SEGMENTED NEUTROPHILS ABSOLUTE COUNT: 5.5 THOU/MM3 (ref 1.8–7.7)
SODIUM BLD-SCNC: 141 MEQ/L (ref 135–145)
SPECIFIC GRAVITY, URINE: 1.01 (ref 1–1.03)
TOTAL PROTEIN: 7.1 G/DL (ref 6.1–8)
TRIGL SERPL-MCNC: 164 MG/DL (ref 0–199)
UROBILINOGEN, URINE: 0.2 EU/DL (ref 0–1)
WBC # BLD: 8.7 THOU/MM3 (ref 4.8–10.8)
WBC UA: ABNORMAL /HPF
YEAST: ABNORMAL

## 2019-12-06 PROCEDURE — G0103 PSA SCREENING: HCPCS

## 2019-12-06 PROCEDURE — 81001 URINALYSIS AUTO W/SCOPE: CPT

## 2019-12-06 PROCEDURE — 84156 ASSAY OF PROTEIN URINE: CPT

## 2019-12-06 PROCEDURE — 82570 ASSAY OF URINE CREATININE: CPT

## 2019-12-06 PROCEDURE — 85025 COMPLETE CBC W/AUTO DIFF WBC: CPT

## 2019-12-06 PROCEDURE — 83036 HEMOGLOBIN GLYCOSYLATED A1C: CPT

## 2019-12-06 PROCEDURE — 80053 COMPREHEN METABOLIC PANEL: CPT

## 2019-12-06 PROCEDURE — 80061 LIPID PANEL: CPT

## 2019-12-06 PROCEDURE — 36415 COLL VENOUS BLD VENIPUNCTURE: CPT

## 2020-09-03 ENCOUNTER — HOSPITAL ENCOUNTER (OUTPATIENT)
Age: 65
Discharge: HOME | End: 2020-09-03
Payer: COMMERCIAL

## 2020-09-03 PROCEDURE — U0002 COVID-19 LAB TEST NON-CDC: HCPCS

## 2020-09-04 LAB
PERFORMING LAB: NORMAL
REPORT: NORMAL
SARS-COV-2: NOT DETECTED

## 2020-09-08 ENCOUNTER — HOSPITAL ENCOUNTER (OUTPATIENT)
Age: 65
Discharge: HOME | End: 2020-09-08
Payer: COMMERCIAL

## 2020-09-08 ENCOUNTER — ANESTHESIA (OUTPATIENT)
Dept: ENDOSCOPY | Age: 65
End: 2020-09-08
Payer: COMMERCIAL

## 2020-09-08 ENCOUNTER — ANESTHESIA EVENT (OUTPATIENT)
Dept: ENDOSCOPY | Age: 65
End: 2020-09-08
Payer: COMMERCIAL

## 2020-09-08 ENCOUNTER — HOSPITAL ENCOUNTER (OUTPATIENT)
Age: 65
Setting detail: OUTPATIENT SURGERY
Discharge: HOME | End: 2020-09-08
Attending: INTERNAL MEDICINE | Admitting: INTERNAL MEDICINE
Payer: COMMERCIAL

## 2020-09-08 VITALS
WEIGHT: 315 LBS | RESPIRATION RATE: 18 BRPM | SYSTOLIC BLOOD PRESSURE: 118 MMHG | BODY MASS INDEX: 44.1 KG/M2 | TEMPERATURE: 96.8 F | OXYGEN SATURATION: 95 % | HEART RATE: 46 BPM | DIASTOLIC BLOOD PRESSURE: 62 MMHG | HEIGHT: 71 IN

## 2020-09-08 VITALS
DIASTOLIC BLOOD PRESSURE: 50 MMHG | RESPIRATION RATE: 18 BRPM | SYSTOLIC BLOOD PRESSURE: 93 MMHG | OXYGEN SATURATION: 94 %

## 2020-09-08 LAB
ALBUMIN SERPL-MCNC: 3.8 G/DL (ref 3.5–5.1)
ALP BLD-CCNC: 77 U/L (ref 38–126)
ALT SERPL-CCNC: 44 U/L (ref 11–66)
ANION GAP SERPL CALCULATED.3IONS-SCNC: 11 MEQ/L (ref 8–16)
AST SERPL-CCNC: 37 U/L (ref 5–40)
AVERAGE GLUCOSE: 141 MG/DL (ref 70–126)
BILIRUB SERPL-MCNC: 0.6 MG/DL (ref 0.3–1.2)
BUN BLDV-MCNC: 16 MG/DL (ref 7–22)
CALCIUM SERPL-MCNC: 10.5 MG/DL (ref 8.5–10.5)
CHLORIDE BLD-SCNC: 100 MEQ/L (ref 98–111)
CHOLESTEROL, TOTAL: 175 MG/DL (ref 100–199)
CO2: 25 MEQ/L (ref 23–33)
CREAT SERPL-MCNC: 1 MG/DL (ref 0.4–1.2)
GFR SERPL CREATININE-BSD FRML MDRD: 75 ML/MIN/1.73M2
GLUCOSE BLD-MCNC: 148 MG/DL (ref 70–108)
HBA1C MFR BLD: 6.7 % (ref 4.4–6.4)
HDLC SERPL-MCNC: 36 MG/DL
LDL CHOLESTEROL CALCULATED: 100 MG/DL
POTASSIUM SERPL-SCNC: 4.1 MEQ/L (ref 3.5–5.2)
SODIUM BLD-SCNC: 136 MEQ/L (ref 135–145)
TOTAL PROTEIN: 6.6 G/DL (ref 6.1–8)
TRIGL SERPL-MCNC: 194 MG/DL (ref 0–199)

## 2020-09-08 PROCEDURE — 3609010600 HC COLONOSCOPY POLYPECTOMY SNARE/COLD BIOPSY: Performed by: INTERNAL MEDICINE

## 2020-09-08 PROCEDURE — 2580000003 HC RX 258: Performed by: INTERNAL MEDICINE

## 2020-09-08 PROCEDURE — 3700000001 HC ADD 15 MINUTES (ANESTHESIA): Performed by: INTERNAL MEDICINE

## 2020-09-08 PROCEDURE — 6360000002 HC RX W HCPCS: Performed by: REGISTERED NURSE

## 2020-09-08 PROCEDURE — 7100000001 HC PACU RECOVERY - ADDTL 15 MIN: Performed by: INTERNAL MEDICINE

## 2020-09-08 PROCEDURE — 7100000000 HC PACU RECOVERY - FIRST 15 MIN: Performed by: INTERNAL MEDICINE

## 2020-09-08 PROCEDURE — 88305 TISSUE EXAM BY PATHOLOGIST: CPT

## 2020-09-08 PROCEDURE — 2720000010 HC SURG SUPPLY STERILE: Performed by: INTERNAL MEDICINE

## 2020-09-08 PROCEDURE — 83036 HEMOGLOBIN GLYCOSYLATED A1C: CPT

## 2020-09-08 PROCEDURE — 3700000000 HC ANESTHESIA ATTENDED CARE: Performed by: INTERNAL MEDICINE

## 2020-09-08 PROCEDURE — 2709999900 HC NON-CHARGEABLE SUPPLY: Performed by: INTERNAL MEDICINE

## 2020-09-08 PROCEDURE — 80053 COMPREHEN METABOLIC PANEL: CPT

## 2020-09-08 PROCEDURE — 80061 LIPID PANEL: CPT

## 2020-09-08 PROCEDURE — 36415 COLL VENOUS BLD VENIPUNCTURE: CPT

## 2020-09-08 RX ORDER — SODIUM CHLORIDE 450 MG/100ML
INJECTION, SOLUTION INTRAVENOUS CONTINUOUS
Status: DISCONTINUED | OUTPATIENT
Start: 2020-09-08 | End: 2020-09-08 | Stop reason: HOSPADM

## 2020-09-08 RX ORDER — PROPOFOL 10 MG/ML
INJECTION, EMULSION INTRAVENOUS PRN
Status: DISCONTINUED | OUTPATIENT
Start: 2020-09-08 | End: 2020-09-08 | Stop reason: SDUPTHER

## 2020-09-08 RX ADMIN — SODIUM CHLORIDE: 4.5 INJECTION, SOLUTION INTRAVENOUS at 12:29

## 2020-09-08 RX ADMIN — PROPOFOL 430 MG: 10 INJECTION, EMULSION INTRAVENOUS at 12:51

## 2020-09-08 ASSESSMENT — PAIN SCALES - GENERAL
PAINLEVEL_OUTOF10: 0
PAINLEVEL_OUTOF10: 0

## 2020-09-08 ASSESSMENT — PAIN - FUNCTIONAL ASSESSMENT: PAIN_FUNCTIONAL_ASSESSMENT: 0-10

## 2020-09-08 NOTE — PROGRESS NOTES
Photos taken, scope used , 1 specimen labeled and 1 jar sent to lab. Colonoscopy completed, Lance Weiss tolerated well.

## 2020-09-08 NOTE — H&P
[address] [address]  Sedation/Analgesia History & Physical    Patient: Eriberto Turner: 1955  Med Rec#: [id number] Acc#: [id number]   Provider Performing Procedure: Shiraz Torres  Primary Care Physician: Ralph Valladares MD    PRE-PROCEDURE   Full CODE [x]Yes  DNR-CCA/DNR-CC []Yes   Brief History/Pre-Procedure Diagnosis:polyp          MEDICAL HISTORY  []CAD/Valve  []Liver Disease  []Lung Disease []Diabetes  []Hypertension []Renal Disease  []Additional information:       has a past medical history of Arthritis, CAD (coronary artery disease), CHF (congestive heart failure) ([facility] [address] Chronic kidney disease, Diabetes mellitus ([facility] [address] Hyperlipidemia, Hypertension, Lower extremity edema, Membranous nephropathy determined by biopsy, Movement disorder, Obesity, Obstructive sleep apnea, Pneumonia, and SOB (shortness of breath). SURGICAL HISTORY   has a past surgical history that includes Cholecystectomy (1980'S); Umbilical hernia repair (10/04/2016); Colonoscopy (2016); joint replacement (Bilateral, 06/03/2014); eye surgery (Left, 12/2016); and knee surgery.   Additional information:       ALLERGIES   Allergies as of 07/27/2020 - Review Complete 08/10/2019   Allergen Reaction Noted    Bactrim [sulfamethoxazole-trimethoprim]      Celebrex [celecoxib]  02/06/2017    Diclofenac  02/06/2017    Dolobid [diflunisal]  02/06/2017    Etodolac  02/06/2017    Fenoprofen  02/06/2017    Gentamycin [gentamicin]  02/06/2017    Ibuprofen  02/06/2017    Indocin [indomethacin]  02/06/2017    Iv dye [iodides]  02/06/2017    Ketoprofen  02/06/2017    Ketorolac  02/06/2017    Meclofenamate  02/06/2017    Mefenamic acid  02/06/2017    Meloxicam  02/06/2017    Nabumetone  02/06/2017    Naproxen  02/06/2017    Nsaids  06/03/2014    Oxaprozin  02/06/2017    Piroxicam  02/06/2017    Rofecoxib  02/06/2017    Salsalate  02/06/2017    Sulindac  02/06/2017    Tetracyclines & related  02/06/2017    Tobramycin 02/06/2017    Tolmetin  02/06/2017    Valdecoxib  02/06/2017    Vancomycin  02/06/2017     Additional information:       MEDICATIONS   Coumadin Use Last 7 Days [x]No []Yes  Antiplatelet drug therapy use last 7 days  [x]No []Yes  Other anticoagulant use last 7 days  [x]No []Yes    Current Facility-Administered Medications:     0.45 % sodium chloride infusion, , Intravenous, Continuous, Shaq Henderson MD    0.45 % sodium chloride infusion, , Intravenous, Continuous, Shaq Henderson MD, Last Rate: 75 mL/hr at 09/08/20 1229  Prior to Admission medications    Medication Sig Start Date End Date Taking? Authorizing Provider   pravastatin (PRAVACHOL) 10 MG tablet Take 10 mg by mouth nightly. Yes Historical Provider, MD   bumetanide (BUMEX) 2 MG tablet Take 2 mg by mouth 2 times daily. Yes Historical Provider, MD   losartan (COZAAR) 100 MG tablet Take 100 mg by mouth daily. Yes Historical Provider, MD   metoprolol (LOPRESSOR) 25 MG tablet Take 25 mg by mouth 2 times daily.      Yes Historical Provider, MD   glipiZIDE (GLUCOTROL) 5 MG tablet Take 10 mg by mouth 2 times daily (before meals)    Historical Provider, MD   aspirin 81 MG chewable tablet Take 81 mg by mouth daily    Historical Provider, MD   potassium chloride SA (K-DUR;KLOR-CON) 20 MEQ tablet 2 tabs po twice daily    Historical Provider, MD     Additional information:       PHYSICAL:   Heart:  [x]Regular rate and rhythm  []Other:    Lungs:  [x]Clear    []Other:    Abdomen: [x]Soft    []Other:    Mental Status: [x]Alert & Oriented  []Other:      VITAL SIGNS   Patient Vitals for the past 24 hrs:   BP Temp Temp src Pulse Resp SpO2 Height Weight   09/08/20 1227 -- -- -- -- -- -- 5' 11\" (1.803 m) (!) 375 lb 6.4 oz (170.3 kg)   09/08/20 1219 (!) 149/77 97.4 °F (36.3 °C) Temporal 51 20 99 % -- --       PLANNED PROCEDURE  []EGD  [x]Colonoscopy []Flex Sigmoid  []ERCP []EUS   []Cystoscopy  [] CATH [] BRONCH   Consent: I have discussed with the patient and/or the patient representative the indication, alternatives, and the possible risks and/or complications of the planned procedure and the anesthesia methods. The patient and/or patient representative appear to understand and agree to proceed. SEDATION  Planned agent:[]Midazolam []Meperidine []Sublimaze []Morphine  []Diazepam [x]Propofol  []Other:     ASA Classification: Class 3 - A patient with severe systemic disease that limits activity but is not incapacitating    Airway Assessment: normal    Monitoring and Safety: The patient will be placed on a cardiac monitor and vital signs, pulse oximetry and level of consciousness will be continuously evaluated throughout the procedure. The patient will be closely monitored until recovery from the medications is complete and the patient has returned to baseline status. Respiratory therapy will be on standby during the procedure. [x]Pre-procedure diagnostic studies complete and results available. Comment:    [x]Previous sedation/anesthesia experiences assessed. Comment:    [x]The patient is an appropriate candidate to undergo the planned procedure sedation and anesthesia. (Refer to nursing sedation/analgesia documentation record)  [x]Formulation and discussion of sedation/procedure plan, risks, and expectations with patient and/or responsible adult completed. [x]Patient examined immediately prior to the procedure.  (Refer to nursing sedation/analgesia documentation record)    Mike Titus MD   Electronically signed 9/8/2020 at 12:42 PM

## 2020-09-08 NOTE — PROGRESS NOTES
[address] Fully awake. Vitals stable. Denies pain. 18  Dr. Clifford Hoskins in room speaking with patient and wife. Denies any pain. Passing gas. 1333  Resting easily in bed. Vitals stable. Tolerating oral liquids. Denies any nausea or pain. 1346  Discharge instructions reviewed at this time with patient and wife.

## 2020-09-08 NOTE — POST SEDATION
[address] [address]  Sedation/Analgesia Post Sedation Record    Patient: Patsy Dominguez: 1955  Med Rec#: [id number] Acc#: [id number]   Procedure Performed By: North Isbell  Primary Care Physician: Kem Pete MD    POST-PROCEDURE    Physicians/Assistants: North Isbell  Procedure Performed:    Sedation/Anesthesia:     Estimated Blood Loss:          ml  Specimens Removed:  []None [x]Other:      Disposition of Specimen:  [x]Pathology []Other      Complications:   [x]None Immediate []Other:     Post-procedure Diagnosis/Findings:             Recommendations:      polyp         North Isbell MD [facility]  Electronically signed 9/8/2020 at 1:17 PM

## 2020-09-08 NOTE — OP NOTE
[address] [place] [address]                                OPERATIVE REPORT    PATIENT NAME: Darius Irwin                   :        1955  MED REC NO:   [id number]                           ROOM:  ACCOUNT NO:   [de-identified]                           ADMIT DATE: 2020  PROVIDER:     Kishore Soto M.D.    DATE OF PROCEDURE:  2020    PROCEDURE PERFORMED:  Colonoscopy plus snare polypectomy. SURGEON:  Kishore Soto MD    INDICATION FOR THE PROCEDURE:  The patient had high-risk screening colon  adenoma in the past.  No significant change in health. See the preop  note for rest of clinicals. ASA CLASSIFICATION:  III. MEDICATIONS:  Per Anesthesia. BIOPSY:  Yes. PHOTOGRAPHS:  Yes. BLOOD LOSS:  None. DESCRIPTION OF THE PROCEDURE:  Informed consent was obtained after  explaining risks and benefits of the procedure and conscious sedation. Possible complications including bleeding, perforation, reaction to  medicine, but not limiting to death, were discussed. CFQ-180 colonoscope was advanced under direct visualization to the  cecum. Landmarks were identified. No polyp or mass seen in the cecum,  ascending colon, transverse colon, descending colon or sigmoid, but in  rectosigmoid, there was a polyp, removed with cold snare. Internal  hemorrhoids were seen. The patient tolerated the procedure well. IMPRESSION:  1. Colon polyp, post snare polypectomy. 2.  Previous history of tubular adenoma. 3.  Internal hemorrhoids. RECOMMENDATION:  Follow up colonoscopy in five years.         Garo Hull M.D.    D: 2020 13:27:51       T: 2020 14:36:42     [email or web address]  Job#: [id number]     Doc#: [id number]    CC:  Primary Care Physician

## 2020-09-08 NOTE — ANESTHESIA PRE PROCEDURE
Department of Anesthesiology  Preprocedure Note       Name:  Norma Rodriguez   Age:  62 y.o.  :  1955                                          MRN:  [id number]         Date:  2020      Surgeon: Richard Hatch):  Mike Titus MD    Procedure: Procedure(s):  COLONOSCOPY    Medications prior to admission:   Prior to Admission medications    Medication Sig Start Date End Date Taking? Authorizing Provider   pravastatin (PRAVACHOL) 10 MG tablet Take 10 mg by mouth nightly. Yes Historical Provider, MD   bumetanide (BUMEX) 2 MG tablet Take 2 mg by mouth 2 times daily. Yes Historical Provider, MD   losartan (COZAAR) 100 MG tablet Take 100 mg by mouth daily. Yes Historical Provider, MD   metoprolol (LOPRESSOR) 25 MG tablet Take 25 mg by mouth 2 times daily. Yes Historical Provider, MD   glipiZIDE (GLUCOTROL) 5 MG tablet Take 10 mg by mouth 2 times daily (before meals)    Historical Provider, MD   aspirin 81 MG chewable tablet Take 81 mg by mouth daily    Historical Provider, MD   potassium chloride SA (K-DUR;KLOR-CON) 20 MEQ tablet 2 tabs po twice daily    Historical Provider, MD       Current medications:    Current Facility-Administered Medications   Medication Dose Route Frequency Provider Last Rate Last Dose    0.45 % sodium chloride infusion   Intravenous Continuous Mike Titus MD        0.45 % sodium chloride infusion   Intravenous Continuous Mike Titus MD 75 mL/hr at 20 1229         Allergies:     Allergies   Allergen Reactions    Bactrim [Sulfamethoxazole-Trimethoprim]      Monitor closely due to kidney disease    Celebrex [Celecoxib]      Avoid due to kidney disease    Diclofenac      Avoid due to kidney disease    Dolobid [Diflunisal]      Avoid due to kidney disease    Etodolac      Avoid due to kidney disease    Fenoprofen      Avoid due to kidney disease    Gentamycin [Gentamicin]      Monitor closely due to kidney disease    Ibuprofen      Avoid due to kidney disease    Indocin [Indomethacin]      Avoid due to kidney disease    Iv Dye [Iodides]      Monitor closely due to kidney disease    Ketoprofen      Avoid due to kidney disease    Ketorolac      Avoid due to kidney disease    Meclofenamate      Avoid due to kidney disease    Mefenamic Acid      Avoid due to kidney disease    Meloxicam      Avoid due to kidney disease    Nabumetone      Avoid due to kidney disease    Naproxen      Avoid due to kidney disease    Nsaids      Avoid due to kidney disease    Oxaprozin      Avoid due to kidney disease    Piroxicam      Avoid due to kidney disease    Rofecoxib      Avoid due to kidney disease    Salsalate      Avoid due to kidney disease    Sulindac      Avoid due to kidney disease    Tetracyclines & Related      Monitor closely due to kidney disease    Tobramycin      Monitor closely due to kidney disease    Tolmetin      Avoid due to kidney disease    Valdecoxib      Avoid due to kidney disease    Vancomycin      Monitor closely due to kidney disease       Problem List:    Patient Active Problem List   Diagnosis Code    Hypertension I10    Hyperlipidemia E78.5    Obesity E66.9    SOB (shortness of breath) R06.02    Obstructive sleep apnea G47.33    Lower extremity edema R60.0    Membranous glomerulonephritis with nephrosis N04.2    Steroid-induced diabetes mellitus ([address] E09.9, T38.0X5A    CAP (community acquired pneumonia) J18.9    Acute-on-chronic kidney injury ([facility] [address] N17.9, N18.9    NNAMDI (obstructive sleep apnea) G47.33    Morbid obesity with BMI of 45.0-49.9, adult ([facility]) E66.01, Z68.42    OA (osteoarthritis) of knee M17.10    S/P TKR (total knee replacement), bilateral Z96.659    Incarcerated umbilical hernia K07.3       Past Medical History:        Diagnosis Date    Arthritis     CAD (coronary artery disease)     CHF (congestive heart failure) ([facility])     Chronic kidney disease     Diabetes mellitus ([facility] [address]     Hyperlipidemia     Hypertension     Lower extremity edema     Membranous nephropathy determined by biopsy     Movement disorder     Obesity     Obstructive sleep apnea     on CPAP    Pneumonia     SOB (shortness of breath)        Past Surgical History:        Procedure Laterality Date    CHOLECYSTECTOMY  1980'S    COLONOSCOPY  2016    Dr. Amezquita Age Left 12/2016    Dr. Melendez [place] office    JOINT REPLACEMENT Bilateral 06/03/2014    total -  Dr. Darryl Kirkpatrick @ [facility]  10/04/2016    With [facility] - Dr. Berg End History:    Social History     Tobacco Use    Smoking status: Never Smoker    Smokeless tobacco: Never Used   Substance Use Topics    Alcohol use: Yes     Comment: RARE                                Counseling given: Not Answered      Vital Signs (Current):   Vitals:    09/08/20 1219 09/08/20 1227   BP: (!) 149/77    Pulse: 51    Resp: 20    Temp: 36.3 °C (97.4 °F)    TempSrc: Temporal    SpO2: 99%    Weight:  (!) 375 lb 6.4 oz (170.3 kg)   Height:  5' 11\" (1.803 m)                                              BP Readings from Last 3 Encounters:   09/08/20 (!) 149/77   08/10/19 (!) 183/96   02/06/17 140/76       NPO Status: Time of last liquid consumption: 0530                        Time of last solid consumption: 1900                        Date of last liquid consumption: 09/08/20                        Date of last solid food consumption: 09/06/20    BMI:   Wt Readings from Last 3 Encounters:   09/08/20 (!) 375 lb 6.4 oz (170.3 kg)   08/10/19 (!) 380 lb (172.4 kg)   02/06/17 (!) 368 lb 6.4 oz (167.1 kg)     Body mass index is 52.36 kg/m².     CBC:   Lab Results   Component Value Date    WBC 8.7 12/06/2019    RBC 5.63 12/06/2019    HGB 16.5 12/06/2019    HCT 50.3 12/06/2019    MCV 89.3 12/06/2019    RDW 13.8 03/20/2018     12/06/2019       CMP:   Lab Results   Component Value Date     12/06/2019    K 4.4 12/06/2019     12/06/2019 CO2 24 12/06/2019    BUN 22 12/06/2019    CREATININE 0.9 12/06/2019    LABGLOM 85 12/06/2019    GLUCOSE 170 12/06/2019    GLUCOSE 113 03/29/2012    PROT 7.1 12/06/2019    CALCIUM 11.0 12/06/2019    BILITOT 0.7 12/06/2019    ALKPHOS 87 12/06/2019    AST 62 12/06/2019    ALT 70 12/06/2019       POC Tests: No results for input(s): POCGLU, POCNA, POCK, POCCL, POCBUN, POCHEMO, POCHCT in the last 72 hours. Coags:   Lab Results   Component Value Date    INR 0.98 08/10/2019    APTT 35.6 08/10/2019       HCG (If Applicable): No results found for: PREGTESTUR, PREGSERUM, HCG, HCGQUANT     ABGs: No results found for: PHART, PO2ART, QFK5HKU, TGL8RFZ, BEART, O7NVKXYI     Type & Screen (If Applicable):  Lab Results   Component Value Date    LABRH POS 06/03/2014       Drug/Infectious Status (If Applicable):  No results found for: HIV, HEPCAB    COVID-19 Screening (If Applicable):   Lab Results   Component Value Date    COVID19 NOT DETECTED 09/03/2020         Anesthesia Evaluation  Patient summary reviewed no history of anesthetic complications:   Airway: Mallampati: III        Dental:          Pulmonary:normal exam    (+) sleep apnea: on CPAP,                             Cardiovascular:    (+) hypertension:, CAD:, CHF:,       ECG reviewed      Echocardiogram reviewed         Beta Blocker:  Dose within 24 Hrs         Neuro/Psych:   Negative Neuro/Psych ROS              GI/Hepatic/Renal:   (+) renal disease: CRI and no interval change, morbid obesity          Endo/Other:    (+) DiabetesType II DM, well controlled, , .                 Abdominal:   (+) obese,     Abdomen: soft. Vascular: negative vascular ROS. Anesthesia Plan      MAC     ASA 3       Induction: intravenous. Anesthetic plan and risks discussed with patient and spouse. Plan discussed with attending.                   KIKA Parra - CRNA   9/8/2020

## 2020-09-08 NOTE — ANESTHESIA POSTPROCEDURE EVALUATION
Department of Anesthesiology  Postprocedure Note    Patient: Brooke Coley  MRN: [id number]  YOB: 1955  Date of evaluation: 9/8/2020  Time:  1:16 PM     Procedure Summary     Date:  09/08/20 Room / Location:  [address] [place] [address]    Anesthesia Start:  8196 Anesthesia Stop:  9997    Procedure:  COLONOSCOPY (N/A ) Diagnosis:  (HISTORY OF COLON POLYPS)    Surgeon:  Andrew Case MD Responsible Provider:  Dionna Singh MD    Anesthesia Type:  MAC ASA Status:  3          Anesthesia Type: MAC    Shree Phase I: Shree Score: 10    Shree Phase II:      Last vitals: Reviewed and per EMR flowsheets.        Anesthesia Post Evaluation    Patient location during evaluation: bedside  Patient participation: complete - patient participated  Level of consciousness: sleepy but conscious  Airway patency: patent  Nausea & Vomiting: no nausea and no vomiting  Complications: no  Cardiovascular status: hemodynamically stable  Respiratory status: acceptable, spontaneous ventilation and nasal cannula  Hydration status: stable

## 2020-12-04 ENCOUNTER — HOSPITAL ENCOUNTER (OUTPATIENT)
Age: 65
Discharge: HOME | End: 2020-12-04
Payer: COMMERCIAL

## 2020-12-04 LAB
AMORPHOUS: ABNORMAL
ANION GAP SERPL CALCULATED.3IONS-SCNC: 11 MEQ/L (ref 8–16)
BACTERIA: ABNORMAL /HPF
BASOPHILS # BLD: 0.9 %
BASOPHILS ABSOLUTE: 0.1 THOU/MM3 (ref 0–0.1)
BILIRUBIN URINE: NEGATIVE
BLOOD, URINE: NEGATIVE
BUN BLDV-MCNC: 22 MG/DL (ref 7–22)
CALCIUM SERPL-MCNC: 11.2 MG/DL (ref 8.5–10.5)
CASTS 2: ABNORMAL /LPF
CASTS UA: ABNORMAL /LPF
CHARACTER, URINE: CLEAR
CHLORIDE BLD-SCNC: 102 MEQ/L (ref 98–111)
CO2: 29 MEQ/L (ref 23–33)
COLOR: YELLOW
CREAT SERPL-MCNC: 1.2 MG/DL (ref 0.4–1.2)
CREATININE URINE: 62.1 MG/DL
CRYSTALS, UA: ABNORMAL
CRYSTALS, UA: ABNORMAL
EOSINOPHIL # BLD: 4.4 %
EOSINOPHILS ABSOLUTE: 0.4 THOU/MM3 (ref 0–0.4)
EPITHELIAL CELLS, UA: ABNORMAL /HPF
ERYTHROCYTE [DISTWIDTH] IN BLOOD BY AUTOMATED COUNT: 13.1 % (ref 11.5–14.5)
ERYTHROCYTE [DISTWIDTH] IN BLOOD BY AUTOMATED COUNT: 43.8 FL (ref 35–45)
GFR SERPL CREATININE-BSD FRML MDRD: 61 ML/MIN/1.73M2
GLUCOSE BLD-MCNC: 172 MG/DL (ref 70–108)
GLUCOSE URINE: NEGATIVE MG/DL
HCT VFR BLD CALC: 48.9 % (ref 42–52)
HEMOGLOBIN: 16 GM/DL (ref 14–18)
IMMATURE GRANS (ABS): 0.1 THOU/MM3 (ref 0–0.07)
IMMATURE GRANULOCYTES: 1.2 %
KETONES, URINE: NEGATIVE
LEUKOCYTE ESTERASE, URINE: NEGATIVE
LYMPHOCYTES # BLD: 22.7 %
LYMPHOCYTES ABSOLUTE: 1.9 THOU/MM3 (ref 1–4.8)
MCH RBC QN AUTO: 30 PG (ref 26–33)
MCHC RBC AUTO-ENTMCNC: 32.7 GM/DL (ref 32.2–35.5)
MCV RBC AUTO: 91.7 FL (ref 80–94)
MISCELLANEOUS 2: ABNORMAL
MISCELLANEOUS LAB TEST RESULT: ABNORMAL
MONOCYTES # BLD: 9.8 %
MONOCYTES ABSOLUTE: 0.8 THOU/MM3 (ref 0.4–1.3)
MUCUS: ABNORMAL
NITRITE, URINE: NEGATIVE
NUCLEATED RED BLOOD CELLS: 0 /100 WBC
PH UA: 6 (ref 5–9)
PLATELET # BLD: 222 THOU/MM3 (ref 130–400)
PMV BLD AUTO: 11 FL (ref 9.4–12.4)
POTASSIUM SERPL-SCNC: 4.7 MEQ/L (ref 3.5–5.2)
PROT/CREAT RATIO, UR: 3.57
PROTEIN UA: 100
PROTEIN, URINE: 222 MG/DL
RBC # BLD: 5.33 MILL/MM3 (ref 4.7–6.1)
RBC URINE: ABNORMAL /HPF
RENAL EPITHELIAL, UA: ABNORMAL
SEG NEUTROPHILS: 61 %
SEGMENTED NEUTROPHILS ABSOLUTE COUNT: 5.2 THOU/MM3 (ref 1.8–7.7)
SODIUM BLD-SCNC: 142 MEQ/L (ref 135–145)
SPECIFIC GRAVITY, URINE: 1.02 (ref 1–1.03)
UROBILINOGEN, URINE: 0.2 EU/DL (ref 0–1)
WBC # BLD: 8.5 THOU/MM3 (ref 4.8–10.8)
WBC UA: ABNORMAL /HPF
YEAST: ABNORMAL

## 2020-12-04 PROCEDURE — 36415 COLL VENOUS BLD VENIPUNCTURE: CPT

## 2020-12-04 PROCEDURE — 84156 ASSAY OF PROTEIN URINE: CPT

## 2020-12-04 PROCEDURE — 81001 URINALYSIS AUTO W/SCOPE: CPT

## 2020-12-04 PROCEDURE — U0003 INFECTIOUS AGENT DETECTION BY NUCLEIC ACID (DNA OR RNA); SEVERE ACUTE RESPIRATORY SYNDROME CORONAVIRUS 2 (SARS-COV-2) (CORONAVIRUS DISEASE [COVID-19]), AMPLIFIED PROBE TECHNIQUE, MAKING USE OF HIGH THROUGHPUT TECHNOLOGIES AS DESCRIBED BY CMS-2020-01-R: HCPCS

## 2020-12-04 PROCEDURE — 82570 ASSAY OF URINE CREATININE: CPT

## 2020-12-04 PROCEDURE — 85025 COMPLETE CBC W/AUTO DIFF WBC: CPT

## 2020-12-04 PROCEDURE — 80048 BASIC METABOLIC PNL TOTAL CA: CPT

## 2020-12-04 PROCEDURE — 99211 OFF/OP EST MAY X REQ PHY/QHP: CPT

## 2020-12-04 NOTE — PROGRESS NOTES
Covid pharyngeal swab obtained and sent to lab. Proper ppe worn during procedure. Pt tolerated well.

## 2020-12-06 LAB — SARS-COV-2: NOT DETECTED

## 2021-01-12 ENCOUNTER — HOSPITAL ENCOUNTER (OUTPATIENT)
Age: 66
Discharge: HOME | End: 2021-01-12
Payer: COMMERCIAL

## 2021-01-12 ENCOUNTER — HOSPITAL ENCOUNTER (OUTPATIENT)
Dept: GENERAL RADIOLOGY | Age: 66
Discharge: HOME | End: 2021-01-12
Payer: COMMERCIAL

## 2021-01-12 DIAGNOSIS — R06.02 BREATH SHORTNESS: ICD-10-CM

## 2021-01-12 PROCEDURE — 71046 X-RAY EXAM CHEST 2 VIEWS: CPT

## 2021-03-25 ENCOUNTER — HOSPITAL ENCOUNTER (OUTPATIENT)
Age: 66
Discharge: HOME | End: 2021-03-25
Payer: COMMERCIAL

## 2021-03-25 LAB
ALBUMIN SERPL-MCNC: 4.1 G/DL (ref 3.5–5.1)
ALP BLD-CCNC: 95 U/L (ref 38–126)
ALT SERPL-CCNC: 60 U/L (ref 11–66)
ANION GAP SERPL CALCULATED.3IONS-SCNC: 10 MEQ/L (ref 8–16)
AST SERPL-CCNC: 45 U/L (ref 5–40)
AVERAGE GLUCOSE: 138 MG/DL (ref 70–126)
BILIRUB SERPL-MCNC: 0.5 MG/DL (ref 0.3–1.2)
BUN BLDV-MCNC: 18 MG/DL (ref 7–22)
CALCIUM SERPL-MCNC: 10.7 MG/DL (ref 8.5–10.5)
CHLORIDE BLD-SCNC: 102 MEQ/L (ref 98–111)
CO2: 26 MEQ/L (ref 23–33)
CREAT SERPL-MCNC: 1.1 MG/DL (ref 0.4–1.2)
GFR SERPL CREATININE-BSD FRML MDRD: 67 ML/MIN/1.73M2
GLUCOSE FASTING: 139 MG/DL (ref 70–108)
HBA1C MFR BLD: 6.6 % (ref 4.4–6.4)
POTASSIUM SERPL-SCNC: 4.1 MEQ/L (ref 3.5–5.2)
PROSTATE SPECIFIC ANTIGEN: 0.65 NG/ML (ref 0–1)
SODIUM BLD-SCNC: 138 MEQ/L (ref 135–145)
TOTAL PROTEIN: 7.2 G/DL (ref 6.1–8)
URIC ACID: 9.5 MG/DL (ref 3.7–7)

## 2021-03-25 PROCEDURE — 84403 ASSAY OF TOTAL TESTOSTERONE: CPT

## 2021-03-25 PROCEDURE — G0103 PSA SCREENING: HCPCS

## 2021-03-25 PROCEDURE — 36415 COLL VENOUS BLD VENIPUNCTURE: CPT

## 2021-03-25 PROCEDURE — 83036 HEMOGLOBIN GLYCOSYLATED A1C: CPT

## 2021-03-25 PROCEDURE — 84550 ASSAY OF BLOOD/URIC ACID: CPT

## 2021-03-25 PROCEDURE — 80053 COMPREHEN METABOLIC PANEL: CPT

## 2021-03-27 LAB — TESTOSTERONE TOTAL: 298 NG/DL (ref 300–720)

## 2021-06-04 ENCOUNTER — HOSPITAL ENCOUNTER (OUTPATIENT)
Age: 66
Discharge: HOME | End: 2021-06-04
Payer: COMMERCIAL

## 2021-06-04 LAB
ALBUMIN SERPL-MCNC: 4 G/DL (ref 3.5–5.1)
ALP BLD-CCNC: 83 U/L (ref 38–126)
ALT SERPL-CCNC: 41 U/L (ref 11–66)
ANION GAP SERPL CALCULATED.3IONS-SCNC: 11 MEQ/L (ref 8–16)
AST SERPL-CCNC: 37 U/L (ref 5–40)
BACTERIA: ABNORMAL /HPF
BASOPHILS # BLD: 1 %
BASOPHILS ABSOLUTE: 0.1 THOU/MM3 (ref 0–0.1)
BILIRUB SERPL-MCNC: 0.6 MG/DL (ref 0.3–1.2)
BILIRUBIN URINE: NEGATIVE
BLOOD, URINE: ABNORMAL
BUN BLDV-MCNC: 24 MG/DL (ref 7–22)
CALCIUM SERPL-MCNC: 11 MG/DL (ref 8.5–10.5)
CASTS 2: ABNORMAL /LPF
CASTS UA: ABNORMAL /LPF
CHARACTER, URINE: CLEAR
CHLORIDE BLD-SCNC: 102 MEQ/L (ref 98–111)
CO2: 26 MEQ/L (ref 23–33)
COLOR: YELLOW
CREAT SERPL-MCNC: 1.3 MG/DL (ref 0.4–1.2)
CREATININE URINE: 21.5 MG/DL
CRYSTALS, UA: ABNORMAL
EOSINOPHIL # BLD: 3.8 %
EOSINOPHILS ABSOLUTE: 0.3 THOU/MM3 (ref 0–0.4)
EPITHELIAL CELLS, UA: ABNORMAL /HPF
ERYTHROCYTE [DISTWIDTH] IN BLOOD BY AUTOMATED COUNT: 13.6 % (ref 11.5–14.5)
ERYTHROCYTE [DISTWIDTH] IN BLOOD BY AUTOMATED COUNT: 45.9 FL (ref 35–45)
GFR SERPL CREATININE-BSD FRML MDRD: 55 ML/MIN/1.73M2
GLUCOSE FASTING: 156 MG/DL (ref 70–108)
GLUCOSE URINE: NEGATIVE MG/DL
HCT VFR BLD CALC: 50.9 % (ref 42–52)
HEMOGLOBIN: 16.1 GM/DL (ref 14–18)
IMMATURE GRANS (ABS): 0.1 THOU/MM3 (ref 0–0.07)
IMMATURE GRANULOCYTES: 1.1 %
KETONES, URINE: NEGATIVE
LEUKOCYTE ESTERASE, URINE: NEGATIVE
LYMPHOCYTES # BLD: 23.4 %
LYMPHOCYTES ABSOLUTE: 2.1 THOU/MM3 (ref 1–4.8)
MCH RBC QN AUTO: 29 PG (ref 26–33)
MCHC RBC AUTO-ENTMCNC: 31.6 GM/DL (ref 32.2–35.5)
MCV RBC AUTO: 91.5 FL (ref 80–94)
MISCELLANEOUS 2: ABNORMAL
MONOCYTES # BLD: 9.4 %
MONOCYTES ABSOLUTE: 0.8 THOU/MM3 (ref 0.4–1.3)
NITRITE, URINE: NEGATIVE
NUCLEATED RED BLOOD CELLS: 0 /100 WBC
PH UA: 6.5 (ref 5–9)
PLATELET # BLD: 234 THOU/MM3 (ref 130–400)
PMV BLD AUTO: 11.6 FL (ref 9.4–12.4)
POTASSIUM SERPL-SCNC: 4.3 MEQ/L (ref 3.5–5.2)
PROT/CREAT RATIO, UR: 2.63
PROTEIN UA: 100
PROTEIN, URINE: 56.6 MG/DL
RBC # BLD: 5.56 MILL/MM3 (ref 4.7–6.1)
RBC URINE: ABNORMAL /HPF
RENAL EPITHELIAL, UA: ABNORMAL
SEG NEUTROPHILS: 61.3 %
SEGMENTED NEUTROPHILS ABSOLUTE COUNT: 5.5 THOU/MM3 (ref 1.8–7.7)
SODIUM BLD-SCNC: 139 MEQ/L (ref 135–145)
SPECIFIC GRAVITY, URINE: 1.01 (ref 1–1.03)
TOTAL PROTEIN: 6.7 G/DL (ref 6.1–8)
UROBILINOGEN, URINE: 0.2 EU/DL (ref 0–1)
WBC # BLD: 9 THOU/MM3 (ref 4.8–10.8)
WBC UA: ABNORMAL /HPF
YEAST: ABNORMAL

## 2021-06-04 PROCEDURE — 85025 COMPLETE CBC W/AUTO DIFF WBC: CPT

## 2021-06-04 PROCEDURE — 80053 COMPREHEN METABOLIC PANEL: CPT

## 2021-06-04 PROCEDURE — 36415 COLL VENOUS BLD VENIPUNCTURE: CPT

## 2021-06-04 PROCEDURE — 82570 ASSAY OF URINE CREATININE: CPT

## 2021-06-04 PROCEDURE — 81001 URINALYSIS AUTO W/SCOPE: CPT

## 2021-06-04 PROCEDURE — 84156 ASSAY OF PROTEIN URINE: CPT

## 2021-09-25 ENCOUNTER — HOSPITAL ENCOUNTER (OUTPATIENT)
Age: 66
Discharge: HOME | End: 2021-09-25
Payer: COMMERCIAL

## 2021-09-25 LAB
ALBUMIN SERPL-MCNC: 4 G/DL (ref 3.5–5.1)
ALP BLD-CCNC: 96 U/L (ref 38–126)
ALT SERPL-CCNC: 46 U/L (ref 11–66)
ANION GAP SERPL CALCULATED.3IONS-SCNC: 9 MEQ/L (ref 8–16)
AST SERPL-CCNC: 41 U/L (ref 5–40)
AVERAGE GLUCOSE: 156 MG/DL (ref 70–126)
BACTERIA: ABNORMAL /HPF
BILIRUB SERPL-MCNC: 0.6 MG/DL (ref 0.3–1.2)
BILIRUBIN URINE: NEGATIVE
BLOOD, URINE: NEGATIVE
BUN BLDV-MCNC: 19 MG/DL (ref 7–22)
CALCIUM SERPL-MCNC: 10.8 MG/DL (ref 8.5–10.5)
CASTS 2: ABNORMAL /LPF
CASTS UA: ABNORMAL /LPF
CHARACTER, URINE: CLEAR
CHLORIDE BLD-SCNC: 105 MEQ/L (ref 98–111)
CHOLESTEROL, TOTAL: 170 MG/DL (ref 100–199)
CO2: 27 MEQ/L (ref 23–33)
COLOR: YELLOW
CREAT SERPL-MCNC: 1.3 MG/DL (ref 0.4–1.2)
CREATININE URINE: 35.7 MG/DL
CRYSTALS, UA: ABNORMAL
EPITHELIAL CELLS, UA: ABNORMAL /HPF
ERYTHROCYTE [DISTWIDTH] IN BLOOD BY AUTOMATED COUNT: 13.6 % (ref 11.5–14.5)
ERYTHROCYTE [DISTWIDTH] IN BLOOD BY AUTOMATED COUNT: 45.5 FL (ref 35–45)
GFR SERPL CREATININE-BSD FRML MDRD: 55 ML/MIN/1.73M2
GLUCOSE BLD-MCNC: 156 MG/DL (ref 70–108)
GLUCOSE URINE: NEGATIVE MG/DL
HBA1C MFR BLD: 7.2 % (ref 4.4–6.4)
HCT VFR BLD CALC: 48.3 % (ref 42–52)
HDLC SERPL-MCNC: 42 MG/DL
HEMOGLOBIN: 15.7 GM/DL (ref 14–18)
KETONES, URINE: NEGATIVE
LDL CHOLESTEROL CALCULATED: 101 MG/DL
LEUKOCYTE ESTERASE, URINE: NEGATIVE
MCH RBC QN AUTO: 29.4 PG (ref 26–33)
MCHC RBC AUTO-ENTMCNC: 32.5 GM/DL (ref 32.2–35.5)
MCV RBC AUTO: 90.4 FL (ref 80–94)
MISCELLANEOUS 2: ABNORMAL
NITRITE, URINE: NEGATIVE
PH UA: 6 (ref 5–9)
PHOSPHORUS: 2.2 MG/DL (ref 2.4–4.7)
PLATELET # BLD: 208 THOU/MM3 (ref 130–400)
PMV BLD AUTO: 10.8 FL (ref 9.4–12.4)
POTASSIUM SERPL-SCNC: 4.5 MEQ/L (ref 3.5–5.2)
PROT/CREAT RATIO, UR: 2.66
PROTEIN UA: 100
PROTEIN, URINE: 94.8 MG/DL
PTH INTACT: 100.1 PG/ML (ref 15–65)
RBC # BLD: 5.34 MILL/MM3 (ref 4.7–6.1)
RBC URINE: ABNORMAL /HPF
RENAL EPITHELIAL, UA: ABNORMAL
SODIUM BLD-SCNC: 141 MEQ/L (ref 135–145)
SPECIFIC GRAVITY, URINE: 1.01 (ref 1–1.03)
TOTAL PROTEIN: 6.9 G/DL (ref 6.1–8)
TRIGL SERPL-MCNC: 136 MG/DL (ref 0–199)
URIC ACID UR: 11.4 MG/DL
UROBILINOGEN, URINE: 0.2 EU/DL (ref 0–1)
VITAMIN D 25-HYDROXY: 17 NG/ML (ref 30–100)
WBC # BLD: 7 THOU/MM3 (ref 4.8–10.8)
WBC UA: ABNORMAL /HPF
YEAST: ABNORMAL

## 2021-09-25 PROCEDURE — 82330 ASSAY OF CALCIUM: CPT

## 2021-09-25 PROCEDURE — 82306 VITAMIN D 25 HYDROXY: CPT

## 2021-09-25 PROCEDURE — 80061 LIPID PANEL: CPT

## 2021-09-25 PROCEDURE — 84560 ASSAY OF URINE/URIC ACID: CPT

## 2021-09-25 PROCEDURE — 84156 ASSAY OF PROTEIN URINE: CPT

## 2021-09-25 PROCEDURE — 82570 ASSAY OF URINE CREATININE: CPT

## 2021-09-25 PROCEDURE — 83970 ASSAY OF PARATHORMONE: CPT

## 2021-09-25 PROCEDURE — 83036 HEMOGLOBIN GLYCOSYLATED A1C: CPT

## 2021-09-25 PROCEDURE — 85027 COMPLETE CBC AUTOMATED: CPT

## 2021-09-25 PROCEDURE — 80053 COMPREHEN METABOLIC PANEL: CPT

## 2021-09-25 PROCEDURE — 84100 ASSAY OF PHOSPHORUS: CPT

## 2021-09-25 PROCEDURE — 36415 COLL VENOUS BLD VENIPUNCTURE: CPT

## 2021-09-25 PROCEDURE — 81001 URINALYSIS AUTO W/SCOPE: CPT

## 2021-09-27 LAB
REASON FOR REJECTION: NORMAL
REJECTED TEST: NORMAL

## 2021-09-28 ENCOUNTER — HOSPITAL ENCOUNTER (OUTPATIENT)
Age: 66
Discharge: HOME | End: 2021-09-28
Payer: COMMERCIAL

## 2021-09-28 PROCEDURE — 82330 ASSAY OF CALCIUM: CPT

## 2021-09-28 PROCEDURE — 36415 COLL VENOUS BLD VENIPUNCTURE: CPT

## 2021-10-01 LAB — CALCIUM IONIZED: NORMAL

## 2021-11-15 ENCOUNTER — HOSPITAL ENCOUNTER (OUTPATIENT)
Age: 66
Discharge: HOME | End: 2021-11-15
Payer: MEDICARE

## 2021-11-15 LAB
ALBUMIN SERPL-MCNC: 4.3 G/DL (ref 3.5–5.1)
ALP BLD-CCNC: 109 U/L (ref 38–126)
ALT SERPL-CCNC: 55 U/L (ref 11–66)
ANION GAP SERPL CALCULATED.3IONS-SCNC: 15 MEQ/L (ref 8–16)
AST SERPL-CCNC: 47 U/L (ref 5–40)
BILIRUB SERPL-MCNC: 0.6 MG/DL (ref 0.3–1.2)
BUN BLDV-MCNC: 28 MG/DL (ref 7–22)
CALCIUM SERPL-MCNC: 11.8 MG/DL (ref 8.5–10.5)
CHLORIDE BLD-SCNC: 103 MEQ/L (ref 98–111)
CO2: 24 MEQ/L (ref 23–33)
CREAT SERPL-MCNC: 1.3 MG/DL (ref 0.4–1.2)
GFR SERPL CREATININE-BSD FRML MDRD: 55 ML/MIN/1.73M2
GLUCOSE BLD-MCNC: 105 MG/DL (ref 70–108)
PHOSPHORUS: 3.1 MG/DL (ref 2.4–4.7)
POTASSIUM SERPL-SCNC: 4.3 MEQ/L (ref 3.5–5.2)
SODIUM BLD-SCNC: 142 MEQ/L (ref 135–145)
TOTAL PROTEIN: 6.9 G/DL (ref 6.1–8)
TSH SERPL DL<=0.05 MIU/L-ACNC: 1.48 UIU/ML (ref 0.4–4.2)

## 2021-11-15 PROCEDURE — 82784 ASSAY IGA/IGD/IGG/IGM EACH: CPT

## 2021-11-15 PROCEDURE — 84443 ASSAY THYROID STIM HORMONE: CPT

## 2021-11-15 PROCEDURE — 83970 ASSAY OF PARATHORMONE: CPT

## 2021-11-15 PROCEDURE — 36415 COLL VENOUS BLD VENIPUNCTURE: CPT

## 2021-11-15 PROCEDURE — 84155 ASSAY OF PROTEIN SERUM: CPT

## 2021-11-15 PROCEDURE — 84590 ASSAY OF VITAMIN A: CPT

## 2021-11-15 PROCEDURE — 81001 URINALYSIS AUTO W/SCOPE: CPT

## 2021-11-15 PROCEDURE — 82306 VITAMIN D 25 HYDROXY: CPT

## 2021-11-15 PROCEDURE — 84165 PROTEIN E-PHORESIS SERUM: CPT

## 2021-11-15 PROCEDURE — 80053 COMPREHEN METABOLIC PANEL: CPT

## 2021-11-15 PROCEDURE — 84100 ASSAY OF PHOSPHORUS: CPT

## 2021-11-15 PROCEDURE — 86334 IMMUNOFIX E-PHORESIS SERUM: CPT

## 2021-11-15 PROCEDURE — 82330 ASSAY OF CALCIUM: CPT

## 2021-11-16 LAB
BACTERIA: ABNORMAL /HPF
BILIRUBIN URINE: NEGATIVE
BLOOD, URINE: NEGATIVE
CASTS 2: ABNORMAL /LPF
CASTS UA: ABNORMAL /LPF
CHARACTER, URINE: CLEAR
COLOR: YELLOW
CRYSTALS, UA: ABNORMAL
EPITHELIAL CELLS, UA: ABNORMAL /HPF
GLUCOSE URINE: NEGATIVE MG/DL
KETONES, URINE: NEGATIVE
LEUKOCYTE ESTERASE, URINE: NEGATIVE
MISCELLANEOUS 2: ABNORMAL
NITRITE, URINE: NEGATIVE
PH UA: 5.5 (ref 5–9)
PROTEIN UA: 300
PTH INTACT: 103.3 PG/ML (ref 15–65)
RBC URINE: ABNORMAL /HPF
RENAL EPITHELIAL, UA: ABNORMAL
SPECIFIC GRAVITY, URINE: 1.02 (ref 1–1.03)
UROBILINOGEN, URINE: 0.2 EU/DL (ref 0–1)
WBC UA: ABNORMAL /HPF
YEAST: ABNORMAL

## 2021-11-17 ENCOUNTER — HOSPITAL ENCOUNTER (OUTPATIENT)
Age: 66
Discharge: HOME | End: 2021-11-17
Payer: COMMERCIAL

## 2021-11-17 LAB
CALCIUM URINE: 12.1 MG/DL
CALCIUM URINE: 545 MG/24HR (ref 100–240)
CREATININE URINE: 2.4 GM/24HR
CREATININE URINE: 53.3 MG/DL
HOURS COLLECTED: 24 HRS
HOURS COLLECTED: 24 HRS
URINE VOLUME MEASURE: 4500 ML
URINE VOLUME, 24 HOUR: 4500 ML

## 2021-11-17 PROCEDURE — 82340 ASSAY OF CALCIUM IN URINE: CPT

## 2021-11-17 PROCEDURE — 82570 ASSAY OF URINE CREATININE: CPT

## 2021-11-17 PROCEDURE — 84156 ASSAY OF PROTEIN URINE: CPT

## 2021-11-17 PROCEDURE — 83883 ASSAY NEPHELOMETRY NOT SPEC: CPT

## 2021-11-17 PROCEDURE — 86335 IMMUNFIX E-PHORSIS/URINE/CSF: CPT

## 2021-11-19 LAB
IMMUNOFIXATION ELECTROPHORESIS: NORMAL
VITAMIN D2 AND D3, TOTAL: NORMAL

## 2021-11-20 LAB — RETINOL (VITAMIN A): NORMAL

## 2021-11-21 LAB — PROTEIN ELECTROPHORESIS, URINE: NORMAL

## 2021-11-22 ENCOUNTER — HOSPITAL ENCOUNTER (OUTPATIENT)
Age: 66
Discharge: HOME | End: 2021-11-22
Payer: MEDICARE

## 2021-11-22 PROCEDURE — 36415 COLL VENOUS BLD VENIPUNCTURE: CPT

## 2021-11-22 PROCEDURE — 82330 ASSAY OF CALCIUM: CPT

## 2021-11-25 LAB — CALCIUM IONIZED: NORMAL

## 2021-12-23 ENCOUNTER — HOSPITAL ENCOUNTER (OUTPATIENT)
Age: 66
Discharge: HOME | End: 2021-12-23
Payer: MEDICARE

## 2021-12-23 LAB
ALBUMIN SERPL-MCNC: 4.4 G/DL (ref 3.5–5.1)
ALP BLD-CCNC: 109 U/L (ref 38–126)
ALT SERPL-CCNC: 62 U/L (ref 11–66)
ANION GAP SERPL CALCULATED.3IONS-SCNC: 12 MEQ/L (ref 8–16)
AST SERPL-CCNC: 59 U/L (ref 5–40)
AVERAGE GLUCOSE: 138 MG/DL (ref 70–126)
BILIRUB SERPL-MCNC: 0.6 MG/DL (ref 0.3–1.2)
BUN BLDV-MCNC: 24 MG/DL (ref 7–22)
CALCIUM SERPL-MCNC: 11.2 MG/DL (ref 8.5–10.5)
CHLORIDE BLD-SCNC: 101 MEQ/L (ref 98–111)
CO2: 25 MEQ/L (ref 23–33)
CREAT SERPL-MCNC: 1.3 MG/DL (ref 0.4–1.2)
GFR SERPL CREATININE-BSD FRML MDRD: 55 ML/MIN/1.73M2
GLUCOSE BLD-MCNC: 139 MG/DL (ref 70–108)
HBA1C MFR BLD: 6.6 % (ref 4.4–6.4)
POTASSIUM SERPL-SCNC: 4.1 MEQ/L (ref 3.5–5.2)
SODIUM BLD-SCNC: 138 MEQ/L (ref 135–145)
TOTAL PROTEIN: 7 G/DL (ref 6.1–8)

## 2021-12-23 PROCEDURE — 83036 HEMOGLOBIN GLYCOSYLATED A1C: CPT

## 2021-12-23 PROCEDURE — 80053 COMPREHEN METABOLIC PANEL: CPT

## 2021-12-23 PROCEDURE — 36415 COLL VENOUS BLD VENIPUNCTURE: CPT

## 2022-02-10 ENCOUNTER — HOSPITAL ENCOUNTER (OUTPATIENT)
Age: 67
Discharge: HOME | End: 2022-02-10
Payer: MEDICARE

## 2022-02-10 LAB
ANION GAP SERPL CALCULATED.3IONS-SCNC: 13 MEQ/L (ref 8–16)
BACTERIA: ABNORMAL /HPF
BASOPHILS # BLD: 1 %
BASOPHILS ABSOLUTE: 0.1 THOU/MM3 (ref 0–0.1)
BILIRUBIN URINE: NEGATIVE
BLOOD, URINE: NEGATIVE
BUN BLDV-MCNC: 23 MG/DL (ref 7–22)
CALCIUM SERPL-MCNC: 11.3 MG/DL (ref 8.5–10.5)
CASTS 2: ABNORMAL /LPF
CASTS UA: ABNORMAL /LPF
CHARACTER, URINE: CLEAR
CHLORIDE BLD-SCNC: 100 MEQ/L (ref 98–111)
CO2: 25 MEQ/L (ref 23–33)
COLOR: YELLOW
CREAT SERPL-MCNC: 1.2 MG/DL (ref 0.4–1.2)
CREATININE URINE: 20.9 MG/DL
CREATININE, URINE: 20.9 MG/DL
CRYSTALS, UA: ABNORMAL
EOSINOPHIL # BLD: 3.8 %
EOSINOPHILS ABSOLUTE: 0.3 THOU/MM3 (ref 0–0.4)
EPITHELIAL CELLS, UA: ABNORMAL /HPF
ERYTHROCYTE [DISTWIDTH] IN BLOOD BY AUTOMATED COUNT: 13.1 % (ref 11.5–14.5)
ERYTHROCYTE [DISTWIDTH] IN BLOOD BY AUTOMATED COUNT: 43.6 FL (ref 35–45)
GFR SERPL CREATININE-BSD FRML MDRD: 61 ML/MIN/1.73M2
GLUCOSE BLD-MCNC: 231 MG/DL (ref 70–108)
GLUCOSE URINE: NEGATIVE MG/DL
HCT VFR BLD CALC: 49.1 % (ref 42–52)
HEMOGLOBIN: 16.3 GM/DL (ref 14–18)
IMMATURE GRANS (ABS): 0.13 THOU/MM3 (ref 0–0.07)
IMMATURE GRANULOCYTES: 1.7 %
KETONES, URINE: NEGATIVE
LEUKOCYTE ESTERASE, URINE: NEGATIVE
LYMPHOCYTES # BLD: 22.4 %
LYMPHOCYTES ABSOLUTE: 1.7 THOU/MM3 (ref 1–4.8)
MCH RBC QN AUTO: 30.4 PG (ref 26–33)
MCHC RBC AUTO-ENTMCNC: 33.2 GM/DL (ref 32.2–35.5)
MCV RBC AUTO: 91.6 FL (ref 80–94)
MICROALBUMIN UR-MCNC: 30.89 MG/DL
MICROALBUMIN/CREAT UR-RTO: 1478 MG/G (ref 0–30)
MISCELLANEOUS 2: ABNORMAL
MONOCYTES # BLD: 9 %
MONOCYTES ABSOLUTE: 0.7 THOU/MM3 (ref 0.4–1.3)
NITRITE, URINE: NEGATIVE
NUCLEATED RED BLOOD CELLS: 0 /100 WBC
PH UA: 6.5 (ref 5–9)
PHOSPHORUS: 2.6 MG/DL (ref 2.4–4.7)
PLATELET # BLD: 240 THOU/MM3 (ref 130–400)
PMV BLD AUTO: 10.7 FL (ref 9.4–12.4)
POTASSIUM SERPL-SCNC: 5 MEQ/L (ref 3.5–5.2)
PROT/CREAT RATIO, UR: 2.16
PROTEIN UA: 30
PROTEIN, URINE: 45.2 MG/DL
PTH INTACT: 84.4 PG/ML (ref 15–65)
RBC # BLD: 5.36 MILL/MM3 (ref 4.7–6.1)
RBC URINE: ABNORMAL /HPF
RENAL EPITHELIAL, UA: ABNORMAL
SEG NEUTROPHILS: 62.1 %
SEGMENTED NEUTROPHILS ABSOLUTE COUNT: 4.8 THOU/MM3 (ref 1.8–7.7)
SODIUM BLD-SCNC: 138 MEQ/L (ref 135–145)
SPECIFIC GRAVITY, URINE: 1.01 (ref 1–1.03)
UROBILINOGEN, URINE: 0.2 EU/DL (ref 0–1)
VITAMIN D 25-HYDROXY: 18 NG/ML (ref 30–100)
WBC # BLD: 7.7 THOU/MM3 (ref 4.8–10.8)
WBC UA: ABNORMAL /HPF
YEAST: ABNORMAL

## 2022-02-10 PROCEDURE — 82043 UR ALBUMIN QUANTITATIVE: CPT

## 2022-02-10 PROCEDURE — 83970 ASSAY OF PARATHORMONE: CPT

## 2022-02-10 PROCEDURE — 81001 URINALYSIS AUTO W/SCOPE: CPT

## 2022-02-10 PROCEDURE — 82570 ASSAY OF URINE CREATININE: CPT

## 2022-02-10 PROCEDURE — 85025 COMPLETE CBC W/AUTO DIFF WBC: CPT

## 2022-02-10 PROCEDURE — 84100 ASSAY OF PHOSPHORUS: CPT

## 2022-02-10 PROCEDURE — 82306 VITAMIN D 25 HYDROXY: CPT

## 2022-02-10 PROCEDURE — 84156 ASSAY OF PROTEIN URINE: CPT

## 2022-02-10 PROCEDURE — 36415 COLL VENOUS BLD VENIPUNCTURE: CPT

## 2022-02-10 PROCEDURE — 80048 BASIC METABOLIC PNL TOTAL CA: CPT

## 2022-02-28 ENCOUNTER — HOSPITAL ENCOUNTER (OUTPATIENT)
Age: 67
Discharge: HOME | End: 2022-02-28
Payer: MEDICARE

## 2022-02-28 LAB
CALCIUM SERPL-MCNC: 11.6 MG/DL (ref 8.5–10.5)
IONIZED CALCIUM, WHOLE BLOOD: 1.4 MMOL/L (ref 1.12–1.32)

## 2022-02-28 PROCEDURE — 36415 COLL VENOUS BLD VENIPUNCTURE: CPT

## 2022-02-28 PROCEDURE — 82330 ASSAY OF CALCIUM: CPT

## 2022-02-28 PROCEDURE — 82310 ASSAY OF CALCIUM: CPT

## 2022-03-10 ENCOUNTER — HOSPITAL ENCOUNTER (OUTPATIENT)
Age: 67
Discharge: HOME | End: 2022-03-10
Payer: MEDICARE

## 2022-03-10 LAB — IONIZED CALCIUM, WHOLE BLOOD: 1.36 MMOL/L (ref 1.12–1.32)

## 2022-03-10 PROCEDURE — 82330 ASSAY OF CALCIUM: CPT

## 2022-04-06 ENCOUNTER — HOSPITAL ENCOUNTER (OUTPATIENT)
Age: 67
Discharge: HOME | End: 2022-04-06
Payer: MEDICARE

## 2022-04-06 DIAGNOSIS — E21.3 HYPERPARATHYROIDISM (HCC): ICD-10-CM

## 2022-04-06 PROCEDURE — 83735 ASSAY OF MAGNESIUM: CPT

## 2022-04-06 PROCEDURE — 36415 COLL VENOUS BLD VENIPUNCTURE: CPT

## 2022-04-06 PROCEDURE — 80053 COMPREHEN METABOLIC PANEL: CPT

## 2022-04-06 PROCEDURE — 83970 ASSAY OF PARATHORMONE: CPT

## 2022-04-06 PROCEDURE — 82306 VITAMIN D 25 HYDROXY: CPT

## 2022-04-07 LAB
ALBUMIN SERPL-MCNC: 4 G/DL (ref 3.5–5.1)
ALP BLD-CCNC: 119 U/L (ref 38–126)
ALT SERPL-CCNC: 67 U/L (ref 11–66)
ANION GAP SERPL CALCULATED.3IONS-SCNC: 14 MEQ/L (ref 8–16)
AST SERPL-CCNC: 55 U/L (ref 5–40)
BILIRUB SERPL-MCNC: 0.5 MG/DL (ref 0.3–1.2)
BUN BLDV-MCNC: 21 MG/DL (ref 7–22)
CALCIUM SERPL-MCNC: 10.6 MG/DL (ref 8.5–10.5)
CHLORIDE BLD-SCNC: 99 MEQ/L (ref 98–111)
CO2: 25 MEQ/L (ref 23–33)
CREAT SERPL-MCNC: 1.4 MG/DL (ref 0.4–1.2)
GFR SERPL CREATININE-BSD FRML MDRD: 51 ML/MIN/1.73M2
GLUCOSE BLD-MCNC: 230 MG/DL (ref 70–108)
MAGNESIUM: 1.9 MG/DL (ref 1.6–2.4)
POTASSIUM SERPL-SCNC: 4.3 MEQ/L (ref 3.5–5.2)
PTH INTACT: 83 PG/ML (ref 15–65)
SODIUM BLD-SCNC: 138 MEQ/L (ref 135–145)
TOTAL PROTEIN: 6.4 G/DL (ref 6.1–8)
VITAMIN D 25-HYDROXY: 25 NG/ML (ref 30–100)

## 2022-04-13 ENCOUNTER — HOSPITAL ENCOUNTER (OUTPATIENT)
Dept: WOMENS IMAGING | Age: 67
Discharge: HOME | End: 2022-04-13
Payer: MEDICARE

## 2022-04-13 DIAGNOSIS — E21.3 HYPERPARATHYROIDISM (HCC): ICD-10-CM

## 2022-04-13 PROCEDURE — 77080 DXA BONE DENSITY AXIAL: CPT

## 2022-05-26 ENCOUNTER — HOSPITAL ENCOUNTER (OUTPATIENT)
Dept: NUCLEAR MEDICINE | Age: 67
Discharge: HOME | End: 2022-05-26
Payer: MEDICARE

## 2022-05-26 PROCEDURE — A9500 TC99M SESTAMIBI: HCPCS | Performed by: INTERNAL MEDICINE

## 2022-05-26 PROCEDURE — 78070 PARATHYROID PLANAR IMAGING: CPT

## 2022-05-26 PROCEDURE — 3430000000 HC RX DIAGNOSTIC RADIOPHARMACEUTICAL: Performed by: INTERNAL MEDICINE

## 2022-05-26 RX ADMIN — Medication 24.9 MILLICURIE: at 11:10

## 2022-06-14 ENCOUNTER — PREP FOR PROCEDURE (OUTPATIENT)
Dept: ENT CLINIC | Age: 67
End: 2022-06-14

## 2022-06-14 ENCOUNTER — OFFICE VISIT (OUTPATIENT)
Dept: ENT CLINIC | Age: 67
End: 2022-06-14
Payer: MEDICARE

## 2022-06-14 VITALS
HEART RATE: 64 BPM | RESPIRATION RATE: 20 BRPM | BODY MASS INDEX: 44.1 KG/M2 | DIASTOLIC BLOOD PRESSURE: 84 MMHG | SYSTOLIC BLOOD PRESSURE: 134 MMHG | WEIGHT: 315 LBS | OXYGEN SATURATION: 96 % | HEIGHT: 71 IN | TEMPERATURE: 97.2 F

## 2022-06-14 DIAGNOSIS — Z01.818 PRE-OP TESTING: Primary | ICD-10-CM

## 2022-06-14 DIAGNOSIS — E21.3 HYPERPARATHYROIDISM (HCC): Primary | ICD-10-CM

## 2022-06-14 DIAGNOSIS — N04.2 MEMBRANOUS GLOMERULONEPHRITIS WITH NEPHROSIS: ICD-10-CM

## 2022-06-14 PROCEDURE — G8417 CALC BMI ABV UP PARAM F/U: HCPCS | Performed by: PHYSICIAN ASSISTANT

## 2022-06-14 PROCEDURE — 1036F TOBACCO NON-USER: CPT | Performed by: PHYSICIAN ASSISTANT

## 2022-06-14 PROCEDURE — 1123F ACP DISCUSS/DSCN MKR DOCD: CPT | Performed by: PHYSICIAN ASSISTANT

## 2022-06-14 PROCEDURE — 3017F COLORECTAL CA SCREEN DOC REV: CPT | Performed by: PHYSICIAN ASSISTANT

## 2022-06-14 PROCEDURE — G8427 DOCREV CUR MEDS BY ELIG CLIN: HCPCS | Performed by: PHYSICIAN ASSISTANT

## 2022-06-14 PROCEDURE — 99203 OFFICE O/P NEW LOW 30 MIN: CPT | Performed by: PHYSICIAN ASSISTANT

## 2022-06-14 NOTE — PROGRESS NOTES
[facility]  [facility] EAR, NOSE AND THROAT  [facility] [place]  Dept: [phone]  Dept Fax: [phone]  Loc: [phone]    Essie Luna is a 73 y.o. male who was referred by Alexia Stern MD for:  Chief Complaint   Patient presents with    Other     New patient here for evaluation of his hyperparathyroidism. Referred by Dr Carol Solomon. Candice Villegas HPI:     Patient presents for evaluation of hyperparathyroidism. Patient was referred by Dr. Carol Solomon. Patient was diagnosed with hypercalcemia early in 2022 and referred to Dr. Carol Solomon of endocrinology for further care. Nuclear medicine parathyroid scan on 5/26/2022 revealed persistent activity in the inferior right lobe of the thyroid gland suspicious for parathyroid adenoma and patient was referred for surgical evaluation. Patient denies a history of stones, but did report to Dr. Carol Solomon he had a scan that showed a kidney stone in the past.  Patient does describe occasional pain in the right kidney area. He denies any blood in urine. Patient had a normal DEXA scan in April 2022 as well. Patient's PTH has been elevated since September 2021. No fevers, chills, chest pain, shortness of breath. Patient's most recent creatinines have been borderline normal to slightly outside of normal limits. He has been diagnosed with membranous nephrosis previously. Patient does have NNAMDI and uses CPAP on a nightly basis. Subjective:      REVIEW OF SYSTEMS:    A complete multi-organ review of systems was performed using a new patient questionnaire, and reviewed by me.   ENT:  negative except as noted in HPI  CONSTITUTIONAL:  negative except as noted in HPI  EYES:  negative except as noted in HPI  RESPIRATORY:  negative except as noted in HPI  CARDIOVASCULAR:  negative except as noted in HPI  GASTROINTESTINAL:  negative except as noted in HPI  GENITOURINARY:  negative except as noted in HPI  MUSCULOSKELETAL: negative except as noted in HPI  SKIN:  negative except as noted in HPI  ENDOCRINE/METABOLIC: negative except as noted in HPI  HEMATOLOGIC/LYMPHATIC:  negative except as noted in HPI  ALLERGY/IMMUN: negative except as noted in HPI  NEUROLOGICAL:  negative except as noted in HPI  BEHAVIOR/PSYCH:  negative except as noted in HPI    Past Medical History:  Past Medical History:   Diagnosis Date    Arthritis     CAD (coronary artery disease)     [facility]     Chronic kidney disease     Diabetes mellitus ([facility]     Hyperlipidemia     Hypertension     Lower extremity edema     Membranous nephropathy determined by biopsy     Movement disorder     Obesity     Obstructive sleep apnea     on CPAP    Pneumonia     SOB (shortness of breath)        Social History:    TOBACCO:   reports that he has never smoked. He has never used smokeless tobacco.  ETOH:   reports current alcohol use of about 3.0 standard drinks of alcohol per week. DRUGS:   reports no history of drug use. Family History:       Problem Relation Age of Onset    High Blood Pressure Mother     Cancer Mother     Diabetes Mother     Other Father         blood clot    Diabetes Father     Arthritis Father     Stroke Father     Heart Disease Father     High Blood Pressure Sister     Diabetes Sister     Arthritis Sister        Surgical History:  Past Surgical History:   Procedure Laterality Date    CHOLECYSTECTOMY  1980'S    COLONOSCOPY  2016    Dr. Fabrice Torres  9/8/2020    COLONOSCOPY POLYPECTOMY SNARE/COLD BIOPSY performed by Yeyo Lim MD at [address] [place] [address] Drive Left 12/2016    Dr. Jill Rodriguez office    JOINT REPLACEMENT Bilateral 06/03/2014    total -  Dr. Bobby Epperson @ [facility]  10/04/2016    With Mesh - Dr. David Grayson        Objective: This is a 73 y.o. male. Patient is alert and oriented to person, place and time.  Patient appears well developed, well nourished. Mood is happy with normal affect. Not obviously hearing impaired. No abnormality in speech noted. /84 (Site: Left Lower Arm, Position: Sitting)   Pulse 64   Temp 97.2 °F (36.2 °C) (Infrared)   Resp 20   Ht 5' 11\" (1.803 m)   Wt (!) 358 lb 6.4 oz (162.6 kg)   SpO2 96%   BMI 49.99 kg/m²     Head:   Normocephalic, atraumatic. No obvious masses or lesions noted. Ears:  External ears: Normal: no scars, lesions or masses. Mastoid process: No erythema noted. No tenderness to palpation. R External auditory canal: clear and free of any pathology  L External auditory canal: clear and free of any pathology   Tympanic membranes:  R intact, translucent                                                  L intact, translucent     Nose:    External nose: Appears midline. No obvious deformity or masses. Septum:  deviated. No septal hematoma. No perforation. Mucosa:  clear  Turbinates: hypertrophic and congested            Discharge:  clear    Mouth/Throat:  Lips, tongue and oral cavity: Normal. No masses or lesions noted   Dentition: fair, with dental attrition of the mandibular teeth, especially incisors. Oral mucosa: moist  Tonsils: atrophic  Oropharynx: normal-appearing mucosa  Hard and soft palates: symmetrical and intact. Salivary glands: not enlarged and no tenderness to palpation. Uvula: midline, no obvious lesions   Gag reflex is present. Neck: Trachea midline. Thyroid not enlarged, no palpable masses or tenderness. Lymphatic: No cervical lymphadenopathy noted. Eyes: TEE, EOM intact. Conjunctiva moist without discharge. Lungs: Normal effort of breathing, not obviously distressed. Neuro: Cranial nerves II-XII grossly intact. Extremities: No clubbing, edema, or cyanosis noted.     Data:        Component Ref Range & Units 4/6/22 1233 2/28/22 1108 2/10/22 0735 12/23/21 0838 11/15/21 1539 9/25/21 0830 6/4/21 0730   Glucose 70 - 108 mg/dL 230 High    231 High  139 High   105  156 High      CREATININE 0.4 - 1.2 mg/dL 1.4 High    1.2  1.3 High   1.3 High   1.3 High   1.3 High     BUN 7 - 22 mg/dL 21   23 High   24 High   28 High   19  24 High     Sodium 135 - 145 meq/L 138   138  138  142  141  139    Potassium 3.5 - 5.2 meq/L 4.3   5.0  4.1  4.3  4.5  4.3    Chloride 98 - 111 meq/L 99   100  101  103  105  102    CO2 23 - 33 meq/L 25   25  25  24  27  26    Calcium 8.5 - 10.5 mg/dL 10.6 High   11.6 High  CM  11.3 High  CM  11.2 High   11.8 High   10.8 High   11.0 High     AST 5 - 40 U/L 55 High     59 High   47 High   41 High   37    Alkaline Phosphatase 38 - 126 U/L 119    109  109  96  83    Total Protein 6.1 - 8.0 g/dL 6.4    7.0  6.9  6.9  6.7    Albumin 3.5 - 5.1 g/dL 4.0    4.4  4.3  4.0  4.0    Total Bilirubin 0.3 - 1.2 mg/dL 0.5    0.6  0.6  0.6  0.6    ALT 11 - 66 U/L 67 High     62 CM  55 CM  46 CM  41 CM        Component Ref Range & Units 4/6/22 1233 2/10/22 0736 11/15/21 1539 9/25/21 0832   Pth Intact 15.0 - 65.0 pg/mL 83.0 High   84.4 High  CM  103.3 High  CM  100.1 High  CM      Radiology Review:     NM parathyroid scan 5/26/2022  FINDINGS: Immediate images demonstrate homogeneous distribution of radiotracer throughout the thyroid gland. There is a focus of radiotracer accumulation slightly inferior to the right lobe of the gland. This accumulation faintly persists on 2 and 4 hour    images.           Impression       Persistent activity inferior to the right lobe of the thyroid gland suspicious for parathyroid adenoma. DEXA scan 4/13/2022-  FINDINGS:   Bone densitometry has been performed using a Hologic bone densitometer. This evaluation includes the lumbar spine and both hips.  The right forearm was also imaged.       Current exam:       Lumbar Spine: L1-L4   Bone mineral density (gm/cm2): 1.449, T-score: 3.3, Z-score: 4.0, This qualifies as normal bone mineral density.       Femoral Neck Left:    Bone mineral density (gm/cm2): 1.013, T-score: 0.7, Z-score: 1.6, This qualifies as normal bone mineral density.       Femoral Neck Right:    Bone mineral density (gm/cm2): 0.904, T-score: -0.2, Z-score: 0.8, This qualifies as normal bone mineral density.       Total Hip Left:    Bone mineral density (gm/cm2): 1.140, T-score: 0.7, Z-score: 1.2, This qualifies as normal bone mineral density.       Total Hip Right:   Bone mineral density (gm/cm2): 1.202, T-score: 1.1, Z-score: 1.6, This qualifies as normal bone mineral density.       Distal 3rd right forearm:   Bone mineral density (gm/cm2): 0.847, T-score: 0.6, Z-score: 1.7, This qualifies as normal bone mineral density.           The patient does not qualify for a FRAX assessment because all T scores for the spine total, hip total, and femoral neck are at or above -1.0.               Impression   This qualifies as normal bone mineral density according to the World Health Organization criteria. The patient is at a normal risk for fracture. Assessment/Plan:     Diagnosis Orders   1. Hyperparathyroidism [facility])  Miscellaneous Surgery   2. Membranous glomerulonephritis with nephrosis         The patient is a 73 y.o. male that presents for evaluation of hyperparathyroidism. Patient was examined by Dr. Patrice Shelton and myself. We recommend proceeding with parathyroidectomy with possible 4 gland exploration. Potential risk/benefits of the procedure were discussed with the patient. Some the risk discussed include (but not limited to): Postop pain, postop infection, postop bleeding, nerve injury (discussed use of nerve integrity monitoring), hypocalcemia. We also discussed the possibility of his parathyroid hormone not dropping appropriately with intraoperative PTH checks that may require excision of more than 1 parathyroid gland. Patient expresses understanding and would like to proceed. He will contact the office in the meantime with new/worsening symptoms or other concerns.     Patient was examined and treatment plan was created in collaboration with Dr. Andrew Long    (Please note that portions of this note may have been completed with a voice recognition program.  Efforts were made to edit the dictation but occasionally words are mis-transcribed.)    Electronically signed by SUZANNE Sterling on 7/5/2022 at 10:17 AM

## 2022-06-15 ENCOUNTER — HOSPITAL ENCOUNTER (OUTPATIENT)
Dept: GENERAL RADIOLOGY | Age: 67
Discharge: HOME | End: 2022-06-15
Payer: MEDICARE

## 2022-06-15 ENCOUNTER — HOSPITAL ENCOUNTER (OUTPATIENT)
Age: 67
Discharge: HOME | End: 2022-06-15
Payer: MEDICARE

## 2022-06-15 DIAGNOSIS — Z01.818 PRE-OP TESTING: ICD-10-CM

## 2022-06-15 LAB
ALBUMIN SERPL-MCNC: 4.3 G/DL (ref 3.5–5.1)
ALP BLD-CCNC: 107 U/L (ref 38–126)
ALT SERPL-CCNC: 59 U/L (ref 11–66)
ANION GAP SERPL CALCULATED.3IONS-SCNC: 12 MEQ/L (ref 8–16)
AST SERPL-CCNC: 63 U/L (ref 5–40)
AVERAGE GLUCOSE: 177 MG/DL (ref 70–126)
BACTERIA: ABNORMAL
BASOPHILS # BLD: 0.7 %
BASOPHILS ABSOLUTE: 0.1 THOU/MM3 (ref 0–0.1)
BILIRUB SERPL-MCNC: 0.6 MG/DL (ref 0.3–1.2)
BILIRUBIN URINE: NEGATIVE
BLOOD, URINE: NEGATIVE
BUN BLDV-MCNC: 22 MG/DL (ref 7–22)
CALCIUM SERPL-MCNC: 10.9 MG/DL (ref 8.5–10.5)
CALCIUM SERPL-MCNC: 10.9 MG/DL (ref 8.5–10.5)
CASTS: ABNORMAL /LPF
CASTS: ABNORMAL /LPF
CHARACTER, URINE: CLEAR
CHLORIDE BLD-SCNC: 101 MEQ/L (ref 98–111)
CHOLESTEROL, TOTAL: 166 MG/DL (ref 100–199)
CO2: 25 MEQ/L (ref 23–33)
COLOR: YELLOW
CREAT SERPL-MCNC: 1.1 MG/DL (ref 0.4–1.2)
CREATININE, URINE: 86.7 MG/DL
CRYSTALS: ABNORMAL
EKG ATRIAL RATE: 56 BPM
EKG P AXIS: 68 DEGREES
EKG P-R INTERVAL: 194 MS
EKG Q-T INTERVAL: 404 MS
EKG QRS DURATION: 102 MS
EKG QTC CALCULATION (BAZETT): 389 MS
EKG R AXIS: 0 DEGREES
EKG T AXIS: 3 DEGREES
EKG VENTRICULAR RATE: 56 BPM
EOSINOPHIL # BLD: 4.4 %
EOSINOPHILS ABSOLUTE: 0.4 THOU/MM3 (ref 0–0.4)
EPITHELIAL CELLS, UA: ABNORMAL /HPF
ERYTHROCYTE [DISTWIDTH] IN BLOOD BY AUTOMATED COUNT: 13.2 % (ref 11.5–14.5)
ERYTHROCYTE [DISTWIDTH] IN BLOOD BY AUTOMATED COUNT: 43.9 FL (ref 35–45)
GFR SERPL CREATININE-BSD FRML MDRD: 67 ML/MIN/1.73M2
GLUCOSE BLD-MCNC: 178 MG/DL (ref 70–108)
GLUCOSE, URINE: NEGATIVE MG/DL
HBA1C MFR BLD: 7.9 % (ref 4.4–6.4)
HCT VFR BLD CALC: 45.8 % (ref 42–52)
HDLC SERPL-MCNC: 39 MG/DL
HEMOGLOBIN: 14.6 GM/DL (ref 14–18)
IMMATURE GRANS (ABS): 0.11 THOU/MM3 (ref 0–0.07)
IMMATURE GRANULOCYTES: 1.3 %
IONIZED CALCIUM, WHOLE BLOOD: 1.37 MMOL/L (ref 1.12–1.32)
KETONES, URINE: NEGATIVE
LDL CHOLESTEROL CALCULATED: 90 MG/DL
LEUKOCYTE ESTERASE, URINE: NEGATIVE
LYMPHOCYTES # BLD: 20.8 %
LYMPHOCYTES ABSOLUTE: 1.7 THOU/MM3 (ref 1–4.8)
MAGNESIUM: 2 MG/DL (ref 1.6–2.4)
MCH RBC QN AUTO: 29.3 PG (ref 26–33)
MCHC RBC AUTO-ENTMCNC: 31.9 GM/DL (ref 32.2–35.5)
MCV RBC AUTO: 92 FL (ref 80–94)
MICROALBUMIN UR-MCNC: 93.02 MG/DL
MICROALBUMIN/CREAT UR-RTO: 1073 MG/G (ref 0–30)
MISCELLANEOUS LAB TEST RESULT: ABNORMAL
MONOCYTES # BLD: 8.3 %
MONOCYTES ABSOLUTE: 0.7 THOU/MM3 (ref 0.4–1.3)
NITRITE, URINE: NEGATIVE
NUCLEATED RED BLOOD CELLS: 0 /100 WBC
PH UA: 7.5 (ref 5–9)
PLATELET # BLD: 219 THOU/MM3 (ref 130–400)
PMV BLD AUTO: 10.9 FL (ref 9.4–12.4)
POTASSIUM SERPL-SCNC: 4.2 MEQ/L (ref 3.5–5.2)
PROSTATE SPECIFIC ANTIGEN: 0.74 NG/ML (ref 0–1)
PROTEIN UA: 300 MG/DL
PTH INTACT: 64.6 PG/ML (ref 15–65)
RBC # BLD: 4.98 MILL/MM3 (ref 4.7–6.1)
RBC URINE: ABNORMAL /HPF
RENAL EPITHELIAL, UA: ABNORMAL
SEG NEUTROPHILS: 64.5 %
SEGMENTED NEUTROPHILS ABSOLUTE COUNT: 5.3 THOU/MM3 (ref 1.8–7.7)
SODIUM BLD-SCNC: 138 MEQ/L (ref 135–145)
SPECIFIC GRAVITY UA: 1.01 (ref 1–1.03)
TOTAL PROTEIN: 6.3 G/DL (ref 6.1–8)
TRIGL SERPL-MCNC: 183 MG/DL (ref 0–199)
URIC ACID: 7.8 MG/DL (ref 3.7–7)
UROBILINOGEN, URINE: 0.2 EU/DL (ref 0–1)
WBC # BLD: 8.2 THOU/MM3 (ref 4.8–10.8)
WBC UA: ABNORMAL /HPF
YEAST: ABNORMAL

## 2022-06-15 PROCEDURE — 84153 ASSAY OF PSA TOTAL: CPT

## 2022-06-15 PROCEDURE — G0103 PSA SCREENING: HCPCS

## 2022-06-15 PROCEDURE — 80053 COMPREHEN METABOLIC PANEL: CPT

## 2022-06-15 PROCEDURE — 93005 ELECTROCARDIOGRAM TRACING: CPT

## 2022-06-15 PROCEDURE — 82043 UR ALBUMIN QUANTITATIVE: CPT

## 2022-06-15 PROCEDURE — 83036 HEMOGLOBIN GLYCOSYLATED A1C: CPT

## 2022-06-15 PROCEDURE — 36415 COLL VENOUS BLD VENIPUNCTURE: CPT

## 2022-06-15 PROCEDURE — 83735 ASSAY OF MAGNESIUM: CPT

## 2022-06-15 PROCEDURE — 83970 ASSAY OF PARATHORMONE: CPT

## 2022-06-15 PROCEDURE — 93010 ELECTROCARDIOGRAM REPORT: CPT | Performed by: NUCLEAR MEDICINE

## 2022-06-15 PROCEDURE — 81001 URINALYSIS AUTO W/SCOPE: CPT

## 2022-06-15 PROCEDURE — 85025 COMPLETE CBC W/AUTO DIFF WBC: CPT

## 2022-06-15 PROCEDURE — 84550 ASSAY OF BLOOD/URIC ACID: CPT

## 2022-06-15 PROCEDURE — 82330 ASSAY OF CALCIUM: CPT

## 2022-06-15 PROCEDURE — 82310 ASSAY OF CALCIUM: CPT

## 2022-06-15 PROCEDURE — 71046 X-RAY EXAM CHEST 2 VIEWS: CPT

## 2022-06-15 PROCEDURE — 80061 LIPID PANEL: CPT

## 2022-07-20 ENCOUNTER — TELEPHONE (OUTPATIENT)
Dept: ENT CLINIC | Age: 67
End: 2022-07-20

## 2022-07-20 NOTE — PROGRESS NOTES
PAT Call Date: 7/20     Surgery Date: 7/28      Surgeon: María Hand   Surgery: Rt parathyroidectomy possible 4 gland exploration    Is patient from a nursing home? No   Any Isolation Precautions? No   Any Pacemaker or ICD? No If YES, has it been checked recently and where? Has the rep been notified? No     On Snapboard?  No    HAS NNAMDI/CPAP   Hard Copy on Chart  In EPIC Pending/Notes   Consent -   Within 30 days; signed, dated & timed by patient and physician     [] On Arrival     [] Blood    Additional Consent Needs:     H&P - Within 30 days    [] Physician To Do     [] H&P Update - If H&P is older then 24 hours    Clearance -  Medical, Cardiac, Pulmonary, etc.   6/22 Law   Orders - Signed and Dated    Copy Sent to Pharm []    [] Physician To Do    Labs - Within 3 months   6/15                    6/15  [x] CBC -ok   [x] CMP- GFR 67   [] INR    [] PTT    [x] Heloise Mylar    [] Liver Enzymes    [] Kidney Function    [] MRSA Nasal   [] MSSA      Others: HGB A1C 7.9   Radiology Studies-   Within 1 year  6/15  [x] Chest X-Ray-ok   [] MRI    [] CT    EKG -   Within 1 year, unless hx of HTN  6/15 Sinus lauren-ok   Cardiac Workup -   Stress Test, Echo, Cath within 18 months    [] Cath                                [] Stress Test                      [] Echo    [] Holter Monitor    [] MAGDALENE

## 2022-07-20 NOTE — PROGRESS NOTES
Follow all instructions given by your physician    NPO after midnight   Sips of water am of surgery with allowed medications  Bring insurance info and 's license  Wear comfortable clean clothing  No jewelry or contact lenses to be worn day of surgery  No glue on dentures morning of surgery;you will be asked to remove them for surgery. Case for glasses. Shower night before and morning of surgery with a liquid antibacterial soap, dry with fresh clean towel; no lotions, creams or powder. Clean sheets and pillow case on bed night before surgery  Bring medications in original bottles  Bring CPAP/BIPAP machine if you have one ( you may be charged if one is needed in recovery room )   needed at discharge and someone over 18 to stay with you for 24 hours overnight (surgery may be cancelled if you don't have this)  Report to \[facility]\"" on 2nd floor  If you would become ill prior to surgery, please call the surgeon  May have a visitor with you, we request that you limit to 2 visitors in pre-op area  Please bring and wear mask  Call [phone] for any questions  Covid questionnaire Complete; Patient negative for symptoms or exposure. See documentation.

## 2022-07-20 NOTE — TELEPHONE ENCOUNTER
I spoke to Maurice Vincent today about the possibility of his surgery being rescheduled due to an emergency of Dr Denise Short. He will continue instructions as if surgery will proceed unless he hears from the office. He understands otherwise and did not feel he needed another appointment prior to surgery.

## 2022-07-25 ENCOUNTER — PREP FOR PROCEDURE (OUTPATIENT)
Dept: ENT CLINIC | Age: 67
End: 2022-07-25

## 2022-07-25 RX ORDER — SODIUM CHLORIDE 9 MG/ML
INJECTION, SOLUTION INTRAVENOUS PRN
Status: CANCELLED | OUTPATIENT
Start: 2022-07-25

## 2022-07-25 RX ORDER — SODIUM CHLORIDE 0.9 % (FLUSH) 0.9 %
5-40 SYRINGE (ML) INJECTION PRN
Status: CANCELLED | OUTPATIENT
Start: 2022-07-25

## 2022-07-25 RX ORDER — SODIUM CHLORIDE 0.9 % (FLUSH) 0.9 %
5-40 SYRINGE (ML) INJECTION EVERY 12 HOURS SCHEDULED
Status: CANCELLED | OUTPATIENT
Start: 2022-07-25

## 2022-07-27 ENCOUNTER — ANESTHESIA EVENT (OUTPATIENT)
Dept: OPERATING ROOM | Age: 67
End: 2022-07-27
Payer: MEDICARE

## 2022-07-27 PROCEDURE — APPNB45 APP NON BILLABLE 31-45 MINUTES: Performed by: NURSE PRACTITIONER

## 2022-07-28 ENCOUNTER — ANESTHESIA (OUTPATIENT)
Dept: OPERATING ROOM | Age: 67
End: 2022-07-28
Payer: MEDICARE

## 2022-07-28 ENCOUNTER — HOSPITAL ENCOUNTER (OUTPATIENT)
Age: 67
Setting detail: OUTPATIENT SURGERY
Discharge: HOME | End: 2022-07-28
Attending: OTOLARYNGOLOGY | Admitting: OTOLARYNGOLOGY
Payer: MEDICARE

## 2022-07-28 VITALS
RESPIRATION RATE: 18 BRPM | OXYGEN SATURATION: 95 % | WEIGHT: 315 LBS | HEIGHT: 71 IN | DIASTOLIC BLOOD PRESSURE: 68 MMHG | HEART RATE: 85 BPM | TEMPERATURE: 98.8 F | SYSTOLIC BLOOD PRESSURE: 140 MMHG | BODY MASS INDEX: 44.1 KG/M2

## 2022-07-28 DIAGNOSIS — E21.3 HYPERPARATHYROIDISM (HCC): ICD-10-CM

## 2022-07-28 DIAGNOSIS — G89.18 ACUTE POST-OPERATIVE PAIN: Primary | ICD-10-CM

## 2022-07-28 LAB
CALCIUM IONIZED: 1.21 MMOL/L (ref 1.12–1.32)
CALCIUM SERPL-MCNC: 10.2 MG/DL (ref 8.5–10.5)
CALCIUM SERPL-MCNC: 10.4 MG/DL (ref 8.5–10.5)
CALCIUM SERPL-MCNC: 10.5 MG/DL (ref 8.5–10.5)
GLUCOSE BLD-MCNC: 179 MG/DL (ref 70–108)
GLUCOSE BLD-MCNC: 306 MG/DL (ref 70–108)
POTASSIUM SERPL-SCNC: 4.1 MEQ/L (ref 3.5–5.2)
PTH INTACT: 112.8 PG/ML (ref 15–65)
PTH INTACT: 145.5 PG/ML (ref 15–65)
PTH INTACT: 268.2 PG/ML (ref 15–65)
PTH INTACT: 325.1 PG/ML (ref 15–65)
PTH INTACT: 89.6 PG/ML (ref 15–65)

## 2022-07-28 PROCEDURE — 84132 ASSAY OF SERUM POTASSIUM: CPT

## 2022-07-28 PROCEDURE — 2580000003 HC RX 258: Performed by: OTOLARYNGOLOGY

## 2022-07-28 PROCEDURE — 3600000013 HC SURGERY LEVEL 3 ADDTL 15MIN: Performed by: OTOLARYNGOLOGY

## 2022-07-28 PROCEDURE — 6360000002 HC RX W HCPCS

## 2022-07-28 PROCEDURE — 82330 ASSAY OF CALCIUM: CPT

## 2022-07-28 PROCEDURE — 60500 EXPLORE PARATHYROID GLANDS: CPT | Performed by: OTOLARYNGOLOGY

## 2022-07-28 PROCEDURE — 82948 REAGENT STRIP/BLOOD GLUCOSE: CPT

## 2022-07-28 PROCEDURE — 36415 COLL VENOUS BLD VENIPUNCTURE: CPT

## 2022-07-28 PROCEDURE — 88331 PATH CONSLTJ SURG 1 BLK 1SPC: CPT

## 2022-07-28 PROCEDURE — 7100000001 HC PACU RECOVERY - ADDTL 15 MIN: Performed by: OTOLARYNGOLOGY

## 2022-07-28 PROCEDURE — 82310 ASSAY OF CALCIUM: CPT

## 2022-07-28 PROCEDURE — 2500000003 HC RX 250 WO HCPCS

## 2022-07-28 PROCEDURE — 3700000000 HC ANESTHESIA ATTENDED CARE: Performed by: OTOLARYNGOLOGY

## 2022-07-28 PROCEDURE — 6370000000 HC RX 637 (ALT 250 FOR IP): Performed by: OTOLARYNGOLOGY

## 2022-07-28 PROCEDURE — 3700000001 HC ADD 15 MINUTES (ANESTHESIA): Performed by: OTOLARYNGOLOGY

## 2022-07-28 PROCEDURE — 6370000000 HC RX 637 (ALT 250 FOR IP): Performed by: ANESTHESIOLOGY

## 2022-07-28 PROCEDURE — 3600000003 HC SURGERY LEVEL 3 BASE: Performed by: OTOLARYNGOLOGY

## 2022-07-28 PROCEDURE — 7100000011 HC PHASE II RECOVERY - ADDTL 15 MIN: Performed by: OTOLARYNGOLOGY

## 2022-07-28 PROCEDURE — 6360000002 HC RX W HCPCS: Performed by: OTOLARYNGOLOGY

## 2022-07-28 PROCEDURE — 7100000000 HC PACU RECOVERY - FIRST 15 MIN: Performed by: OTOLARYNGOLOGY

## 2022-07-28 PROCEDURE — 2709999900 HC NON-CHARGEABLE SUPPLY: Performed by: OTOLARYNGOLOGY

## 2022-07-28 PROCEDURE — 83970 ASSAY OF PARATHORMONE: CPT

## 2022-07-28 PROCEDURE — 88305 TISSUE EXAM BY PATHOLOGIST: CPT

## 2022-07-28 PROCEDURE — 2720000010 HC SURG SUPPLY STERILE: Performed by: OTOLARYNGOLOGY

## 2022-07-28 PROCEDURE — 7100000010 HC PHASE II RECOVERY - FIRST 15 MIN: Performed by: OTOLARYNGOLOGY

## 2022-07-28 RX ORDER — SODIUM CHLORIDE 9 MG/ML
INJECTION, SOLUTION INTRAVENOUS CONTINUOUS
Status: DISCONTINUED | OUTPATIENT
Start: 2022-07-28 | End: 2022-07-28 | Stop reason: HOSPADM

## 2022-07-28 RX ORDER — DEXAMETHASONE SODIUM PHOSPHATE 10 MG/ML
10 INJECTION, EMULSION INTRAMUSCULAR; INTRAVENOUS EVERY 6 HOURS
Status: DISCONTINUED | OUTPATIENT
Start: 2022-07-28 | End: 2022-07-28

## 2022-07-28 RX ORDER — HYDROCODONE BITARTRATE AND ACETAMINOPHEN 5; 325 MG/1; MG/1
1 TABLET ORAL EVERY 4 HOURS
Qty: 30 TABLET | Refills: 0 | Status: SHIPPED | OUTPATIENT
Start: 2022-07-28 | End: 2022-08-02

## 2022-07-28 RX ORDER — PROPOFOL 10 MG/ML
INJECTION, EMULSION INTRAVENOUS PRN
Status: DISCONTINUED | OUTPATIENT
Start: 2022-07-28 | End: 2022-07-28 | Stop reason: SDUPTHER

## 2022-07-28 RX ORDER — AMOXICILLIN 500 MG/1
500 CAPSULE ORAL 2 TIMES DAILY
Qty: 20 CAPSULE | Refills: 0 | Status: SHIPPED | OUTPATIENT
Start: 2022-07-28 | End: 2022-08-07

## 2022-07-28 RX ORDER — ONDANSETRON 2 MG/ML
INJECTION INTRAMUSCULAR; INTRAVENOUS PRN
Status: DISCONTINUED | OUTPATIENT
Start: 2022-07-28 | End: 2022-07-28 | Stop reason: SDUPTHER

## 2022-07-28 RX ORDER — FENTANYL CITRATE 50 UG/ML
50 INJECTION, SOLUTION INTRAMUSCULAR; INTRAVENOUS EVERY 5 MIN PRN
Status: DISCONTINUED | OUTPATIENT
Start: 2022-07-28 | End: 2022-07-28 | Stop reason: HOSPADM

## 2022-07-28 RX ORDER — GLYCOPYRROLATE 1 MG/5 ML
SYRINGE (ML) INTRAVENOUS PRN
Status: DISCONTINUED | OUTPATIENT
Start: 2022-07-28 | End: 2022-07-28 | Stop reason: SDUPTHER

## 2022-07-28 RX ORDER — SODIUM CHLORIDE 0.9 % (FLUSH) 0.9 %
5-40 SYRINGE (ML) INJECTION PRN
Status: DISCONTINUED | OUTPATIENT
Start: 2022-07-28 | End: 2022-07-28 | Stop reason: HOSPADM

## 2022-07-28 RX ORDER — LIDOCAINE HYDROCHLORIDE 20 MG/ML
INJECTION, SOLUTION INTRAVENOUS PRN
Status: DISCONTINUED | OUTPATIENT
Start: 2022-07-28 | End: 2022-07-28 | Stop reason: SDUPTHER

## 2022-07-28 RX ORDER — EPINEPHRINE 1 MG/ML
INJECTION, SOLUTION, CONCENTRATE INTRAVENOUS PRN
Status: DISCONTINUED | OUTPATIENT
Start: 2022-07-28 | End: 2022-07-28 | Stop reason: ALTCHOICE

## 2022-07-28 RX ORDER — SUCCINYLCHOLINE/SOD CL,ISO/PF 200MG/10ML
SYRINGE (ML) INTRAVENOUS PRN
Status: DISCONTINUED | OUTPATIENT
Start: 2022-07-28 | End: 2022-07-28 | Stop reason: SDUPTHER

## 2022-07-28 RX ORDER — HYDROCODONE BITARTRATE AND ACETAMINOPHEN 5; 325 MG/1; MG/1
1 TABLET ORAL ONCE AS NEEDED
Status: COMPLETED | OUTPATIENT
Start: 2022-07-28 | End: 2022-07-28

## 2022-07-28 RX ORDER — DEXAMETHASONE SODIUM PHOSPHATE 10 MG/ML
INJECTION, EMULSION INTRAMUSCULAR; INTRAVENOUS PRN
Status: DISCONTINUED | OUTPATIENT
Start: 2022-07-28 | End: 2022-07-28 | Stop reason: SDUPTHER

## 2022-07-28 RX ORDER — ONDANSETRON 2 MG/ML
4 INJECTION INTRAMUSCULAR; INTRAVENOUS
Status: DISCONTINUED | OUTPATIENT
Start: 2022-07-28 | End: 2022-07-28 | Stop reason: HOSPADM

## 2022-07-28 RX ORDER — SODIUM CHLORIDE 0.9 % (FLUSH) 0.9 %
5-40 SYRINGE (ML) INJECTION EVERY 12 HOURS SCHEDULED
Status: DISCONTINUED | OUTPATIENT
Start: 2022-07-28 | End: 2022-07-28 | Stop reason: HOSPADM

## 2022-07-28 RX ORDER — EPHEDRINE SULFATE/0.9% NACL/PF 50 MG/5 ML
SYRINGE (ML) INTRAVENOUS PRN
Status: DISCONTINUED | OUTPATIENT
Start: 2022-07-28 | End: 2022-07-28 | Stop reason: SDUPTHER

## 2022-07-28 RX ORDER — DEXAMETHASONE SODIUM PHOSPHATE 10 MG/ML
10 INJECTION, EMULSION INTRAMUSCULAR; INTRAVENOUS ONCE
Status: COMPLETED | OUTPATIENT
Start: 2022-07-28 | End: 2022-07-28

## 2022-07-28 RX ORDER — MEPERIDINE HYDROCHLORIDE 25 MG/ML
12.5 INJECTION INTRAMUSCULAR; INTRAVENOUS; SUBCUTANEOUS EVERY 5 MIN PRN
Status: DISCONTINUED | OUTPATIENT
Start: 2022-07-28 | End: 2022-07-28 | Stop reason: HOSPADM

## 2022-07-28 RX ORDER — FENTANYL CITRATE 50 UG/ML
INJECTION, SOLUTION INTRAMUSCULAR; INTRAVENOUS PRN
Status: DISCONTINUED | OUTPATIENT
Start: 2022-07-28 | End: 2022-07-28 | Stop reason: SDUPTHER

## 2022-07-28 RX ORDER — DEXAMETHASONE SODIUM PHOSPHATE 10 MG/ML
INJECTION, EMULSION INTRAMUSCULAR; INTRAVENOUS
Status: COMPLETED
Start: 2022-07-28 | End: 2022-07-28

## 2022-07-28 RX ORDER — SODIUM CHLORIDE 9 MG/ML
INJECTION, SOLUTION INTRAVENOUS PRN
Status: DISCONTINUED | OUTPATIENT
Start: 2022-07-28 | End: 2022-07-28 | Stop reason: HOSPADM

## 2022-07-28 RX ADMIN — ONDANSETRON 4 MG: 2 INJECTION INTRAMUSCULAR; INTRAVENOUS at 10:12

## 2022-07-28 RX ADMIN — Medication 10 MG: at 08:09

## 2022-07-28 RX ADMIN — Medication 200 MG: at 07:48

## 2022-07-28 RX ADMIN — PROPOFOL 50 MG: 10 INJECTION, EMULSION INTRAVENOUS at 08:25

## 2022-07-28 RX ADMIN — Medication 0.4 MG: at 08:15

## 2022-07-28 RX ADMIN — SODIUM CHLORIDE: 9 INJECTION, SOLUTION INTRAVENOUS at 07:23

## 2022-07-28 RX ADMIN — FENTANYL CITRATE 100 MCG: 50 INJECTION, SOLUTION INTRAMUSCULAR; INTRAVENOUS at 07:44

## 2022-07-28 RX ADMIN — FENTANYL CITRATE 50 MCG: 50 INJECTION, SOLUTION INTRAMUSCULAR; INTRAVENOUS at 13:06

## 2022-07-28 RX ADMIN — Medication 3000 MG: at 12:07

## 2022-07-28 RX ADMIN — Medication 15 MG: at 08:11

## 2022-07-28 RX ADMIN — Medication 15 MG: at 08:13

## 2022-07-28 RX ADMIN — SODIUM CHLORIDE: 9 INJECTION, SOLUTION INTRAVENOUS at 08:46

## 2022-07-28 RX ADMIN — DEXAMETHASONE SODIUM PHOSPHATE 10 MG: 10 INJECTION, EMULSION INTRAMUSCULAR; INTRAVENOUS at 07:24

## 2022-07-28 RX ADMIN — LIDOCAINE HYDROCHLORIDE 100 MG: 20 INJECTION, SOLUTION INTRAVENOUS at 07:48

## 2022-07-28 RX ADMIN — INSULIN HUMAN 8 UNITS: 100 INJECTION, SOLUTION PARENTERAL at 13:36

## 2022-07-28 RX ADMIN — Medication 10 MG: at 08:15

## 2022-07-28 RX ADMIN — HYDROCODONE BITARTRATE AND ACETAMINOPHEN 1 TABLET: 5; 325 TABLET ORAL at 15:38

## 2022-07-28 RX ADMIN — PROPOFOL 200 MG: 10 INJECTION, EMULSION INTRAVENOUS at 07:48

## 2022-07-28 RX ADMIN — ONDANSETRON 4 MG: 2 INJECTION INTRAMUSCULAR; INTRAVENOUS at 13:05

## 2022-07-28 RX ADMIN — FENTANYL CITRATE 100 MCG: 50 INJECTION, SOLUTION INTRAMUSCULAR; INTRAVENOUS at 08:26

## 2022-07-28 RX ADMIN — Medication 3000 MG: at 07:57

## 2022-07-28 RX ADMIN — DEXAMETHASONE SODIUM PHOSPHATE 10 MG: 10 INJECTION, EMULSION INTRAMUSCULAR; INTRAVENOUS at 07:57

## 2022-07-28 ASSESSMENT — PAIN DESCRIPTION - LOCATION
LOCATION: ARM
LOCATION: ARM

## 2022-07-28 ASSESSMENT — PAIN SCALES - GENERAL
PAINLEVEL_OUTOF10: 9
PAINLEVEL_OUTOF10: 6
PAINLEVEL_OUTOF10: 0
PAINLEVEL_OUTOF10: 3

## 2022-07-28 ASSESSMENT — ENCOUNTER SYMPTOMS: SHORTNESS OF BREATH: 1

## 2022-07-28 ASSESSMENT — PAIN DESCRIPTION - DESCRIPTORS
DESCRIPTORS: ACHING;DISCOMFORT
DESCRIPTORS: ACHING

## 2022-07-28 ASSESSMENT — PAIN DESCRIPTION - ORIENTATION
ORIENTATION: LEFT
ORIENTATION: LEFT

## 2022-07-28 NOTE — PROGRESS NOTES
Pt has met discharge criteria and states she is ready for discharge to home. IV removed, gauze and tape applied. Dressed in own clothes and personal belongings gathered. Discharge instructions (with opioid medication education information) given to pt and family. Pt and family verbalized understanding of discharge instructions, prescriptions and follow up appointments. RN verified with Dr. Feroz Roth patient to apply bacitracin to OBINNA drain insertion site twice daily for 4 days- wife updated- verbalized understanding. Pt transported to discharge lobby by [facility] staff.

## 2022-07-28 NOTE — OP NOTE
Operative Note      Patient: Fernando Lemus  YOB: 1955  MRN: [id number]    Date of Procedure: 7/28/2022    Pre-Op Diagnosis: Hyperparathyroidism ([address] [E21.3]     Post-Op Diagnosis: Same       Procedure(s):  Right Parathyroidectomy with Gland Exploration    Surgeon(s):  Jenny Hernandes MD    Assistant:   * No surgical staff found *    Anesthesia: General    Estimated Blood Loss (mL): less than 50     Complications: None    Specimens:   ID Type Source Tests Collected by Time Destination   A : Right Superior Parathyroid Gland Tissue Thyroid SURGICAL PATHOLOGY Jenny Hernandes MD 7/28/2022 0344    B : Right inferior Pole Mass Tissue Parathyriod Glands SURGICAL PATHOLOGY Jenny Hernandes MD 7/28/2022 5226    C : Left Inferior Fold Mass, R/O Parathyroid Tissue Parathyriod Glands SURGICAL PATHOLOGY Jenny Hernandes MD 7/28/2022 1155        Implants:  * No implants in log *      Drains:   Closed/Suction Drain Right; Anterior Neck Bulb (Active)   Site Description Clean, dry & intact 07/28/22 1600   Dressing Status Other (Comment) 07/28/22 1600   Drainage Appearance Bloody 07/28/22 1428   Drain Status To bulb suction 07/28/22 1600   Output (ml) 20 ml 07/28/22 1600       [REMOVED] Urinary Catheter Turner (Removed)   Output (mL) 50 mL 07/28/22 1330       Findings: 1. Right inferior pole parathyroid adenoma identified early in initial dissection  2. Right upper pole parathyroid difficult to isolate; tissue sent off as mass separate from thyroid gland returned as \"normal thyroid tissue\" without evidence of lymph node tissue/architecture. 3.  Given continued elevated PTH levels, I opted to begin dissection in the left thyroid bed. I removed 1 prospective parathyroid tissue that turned out being adipose. I opted to stop the parathyroid exploration when the PTH levels continue to be between 3 and 5 times preop levels on subsequent assays while the calcium dropped to normal levels.     Detailed Description of Procedure: The patient was taken to the operating room awake and placed in the supine position. General anesthesia was induced and the patient was intubated using rapid sequence induction with a 7.0 Nims endotracheal tube for intraoperative nerve integrity monitoring. A timeout was employed verifying the patient's identity and planned procedure. The patient's head was placed in a Ingram head trinidad and extended with the body placed in reverse Trendelenburg to keep the head above the level of the heart. I marked and injected a neck skin crease at the anterior central neck base to a distance of approximately 7 cm. I used 1-50,000 saline epinephrine with a 25-gauge needle to promote hemostasis in the dermis. A total of 5 cc were used. The patient was prepped and draped in usual fashion for a sterile approach to the deep neck space. Using a 10 blade I made a transverse incision through this neck skin crease into the subplatysmal space. I raised inferiorly and superiorly based subplatysmal flaps. I tested the nerve integrity monitoring head of these incisions and could get somewhat reliable nerve stimulation by tapping the paratracheal space then about half the time identified the correct side. I checked the positioning of the tube before the start of the case and it was as originally oriented. I dissected the pretracheal space down to the level of the trachea from the inferior aspect of the thyroid lamina to the thoracic inlet. I reflected the fascia off of the thyroid gland as I raised the strap muscles away from the gland on the right side. I also dissected the space superficial to the strap muscles in order to get access to the internal jugular vein for blood tests during the operation.   I isolated the isthmus of the thyroid gland and dissected deep to it at the border with the right hemithyroid using the Covidien dissector to coagulate the vessels and divide these tissues to reflect the right hemithyroid superficially and laterally. I then dissected the lateral soft tissues to the thyroid gland and placed a series of retractors underneath the strap muscles to gain access to the inferior pole space. In doing so I gradually isolated a complex tan mass of tissue along the inferior lateral aspect of the deep neck deep to the clavicle. I maintained close visualization for any soft tissues that looked like the recurrent laryngeal nerve and divided tissues that did not resemble the nerve and did not stimulate the nerve integrity monitoring system. This included placing clips on vascular structures before their division to promote hemostasis. I sent this isolated specimen to pathology and it was returned as being positive for a hypercellular parathyroid adenoma. I took a blood sample from the internal jugular vein and sent it after the removal of this mass with the PTH being found to be approximately 113 and the calcium level being 10.4 or just below the upper limit of normal at 10.5. I therefore continue to look for additional adenomatous tissue or abnormal looking parathyroid gland tissue on the right side. I continued the dissection in the perithyroidal space looking lateral and superior along the deep surface of the thyroid gland finding a small dark soft tissue mass that was clearly separate from the thyroid gland and could represent a small even atrophic parathyroid gland. I sent this tissue off and it was returned as positive for thyroid tissue and devoid of lymphocyte structure. The thyroid tissue was considered normal histologically. I repeated the PTH levels after the removal of the second piece of tissue and it was more normal at 89.6 with a calcium of 10.4.   This was still significantly higher than the patient's preop PTH levels so I continued the right-sided dissection to look underneath the superior pole by taking down the superior pole vessels and double clipping the arterial and venous components separately using medium size clips proximally and distally. I reflected the superior pole medially and looked in the deep space finding no good candidates for a superior pole parathyroid gland. I sent an additional PTH level to see if I had not waited long enough after the earlier level check of the PTH. At that point a level of 325 was returned or literally 5 times the patient's preop levels. I had this level checked by the lab and they contacted us by phone letting me know that the level was reproduced. Considering how high this lab was, I began to suspect a problem with the assay but opted to look in the left neck to see if an obvious additional adenoma could be found in that region. In order to do so I elevated the isthmus from the trachea with specific attention to the SLOANE veins of the isthmus which I crossclamped and suture-ligated distally after saline and proximally. I then reflected the thyroid gland off of the trachea and dissected the soft tissue overlying the thyroid gland so that I could look laterally and inferiorly. I dissected the inferior pole broadly maintaining nerve integrity monitoring and careful dissection techniques throughout. I found a reasonable candidate for a parathyroid adenoma but although it was relatively pale, had multiple arterial sources feeding it. I dissected it free of its vascular supply and handed off the field to be looked at as possible adenoma. It was returned as normal adipose tissue. I rechecked the PTH levels and calcium levels and found the PTH to still be very high almost 5 times preop at 268.2. I therefore opted to forego further dissection to avoid risk to the patient's recurrent laryngeal nerves with the possibility that the problem was in the assay method given that the patient's calcium levels have now dropped to 10.2. That was clearly contrary to this far higher elevation of the PTH levels on this assay.     I carefully checked both dissection beds for hemostasis and short up to the hemostasis of the left-sided inferior lateral dissection with additional vascular clips. I then irrigated out both sides and checked for hemostasis employing a Valsalva maneuver to look for any additional bleeding. None was detected. I placed a 15 Western Sharmin round slotted Abdiel drain through separate stab incision to the right and inferior aspect of the neck from the primary wound. I sutured the drain in position with a 2-0 nylon suture in a cutting needle in the usual manner. I then placed the drain into both paratracheal cul-de-sacs including a portion of the open sites in the subplatysmal space. I closed the straps in the midline with one 3-0 Monocryl on an SH taper needle in a figure-of-eight configuration. I closed the subcutaneous space with 6 inverted 3-0 Monocryl figure-of-eight sutures on an SH taper needle followed by a running subcuticular suture placed by my assistant of 5-0 Monocryl on a P3 cutting needle. Wound glue was applied to the wound surface and antibiotic ointment to the drain surface. The drain was holding vacuum in the operating room. The patient's head was flexed as the drapes were removed. General anesthesia was reversed and the patient was extubated in the operating room without difficulty. He was taken to recovery in satisfactory condition with a normal sounding voice and no airway complaints. Estimated blood loss in the case was less than 50 cc and the patient received approximately 1.7 L of intravenous lactated Ringer's intraoperatively. He produced approximately 350 cc of urine with a Turner being set to be removed in recovery. Counts were considered accurate x3. No blood products were transfused. No intraoperative complications were detected. I was present for and participated in the patient's entire operation up to the patient's skin closure. I was also present for his reversal and extubation.     Disposition: The patient's calcium levels will be checked in recovery prior to considering discharging him to home which is his earnest desire given that a family member was being buried today and he wishes very strongly to go to that service. His drain will be removed in the clinic or I reviewed the patient's pathology and go over diagnostic and treatment options should others need to be considered. I will have the patient undergo a repeat calcium and PTH evaluation ahead of his visit in order to get an outpatient confirmation of his PTH levels.     Electronically signed by Yessica Pink MD on 7/28/2022 at 4:50 PM

## 2022-07-28 NOTE — H&P
Avoid due to kidney disease    Nabumetone      Avoid due to kidney disease    Naproxen      Avoid due to kidney disease    Nsaids Other (See Comments)     Avoid due to kidney disease    Oxaprozin      Avoid due to kidney disease    Piroxicam      Avoid due to kidney disease    Rofecoxib      Avoid due to kidney disease    Salsalate      Avoid due to kidney disease    Sulindac      Avoid due to kidney disease    Tetracyclines & Related      Monitor closely due to kidney disease    Tobramycin      Monitor closely due to kidney disease    Tolmetin      Avoid due to kidney disease    Valdecoxib      Avoid due to kidney disease    Vancomycin      Monitor closely due to kidney disease       Current Facility-Administered Medications   Medication Dose Route Frequency Provider Last Rate Last Admin    0.9 % sodium chloride infusion   IntraVENous Continuous Jenny Hernandes MD        ceFAZolin (ANCEF) 3000 mg in dextrose 5 % 100 mL IVPB  3,000 mg IntraVENous Once Jenny Hernandes MD        dexamethasone (PF) (DECADRON) injection 10 mg  10 mg IntraVENous Once Jenny Hernandes MD        dexamethasone (PF) (DECADRON) 10 MG/ML injection             ceFAZolin (ANCEF) IVPB                Current vitals  BP (!) 141/77   Pulse 55   Temp 97.4 °F (36.3 °C) (Temporal)   Resp 16   Ht 5' 11\" (1.803 m)   Wt (!) 352 lb (159.7 kg)   SpO2 96%   BMI 49.09 kg/m²     Lung sounds clear throughout  Hear rate wnl; regular rhythm without murmur    Proceed with original surgical plan:  Right inferior parathyroidectomy with possible 4 gland exploration    Electronically signed by KIKA Marin CNP on 7/28/2022 at 7:23 AM      -----------------------------------------  -----------------------------------------      [facility]  [address] [address]  Dept: [phone]  Dept Fax: [phone]  Loc: [phone]     Fernando Lemus is a 73 y.o. male who was referred by Simon Sprign MD for:       Chief Complaint   Patient presents with    Other       New patient here for evaluation of his hyperparathyroidism. Referred by Dr Urmila Slaughter. Pooja Evangelista HPI:      Patient presents for evaluation of hyperparathyroidism. Patient was referred by Dr. Urmila Slaughter. Patient was diagnosed with hypercalcemia early in 2022 and referred to Dr. Urmila Slaughter of endocrinology for further care. Nuclear medicine parathyroid scan on 5/26/2022 revealed persistent activity in the inferior right lobe of the thyroid gland suspicious for parathyroid adenoma and patient was referred for surgical evaluation. Patient denies a history of stones, but did report to Dr. Urmila Slaughter he had a scan that showed a kidney stone in the past.  Patient does describe occasional pain in the right kidney area. He denies any blood in urine. Patient had a normal DEXA scan in April 2022 as well. Patient's PTH has been elevated since September 2021. No fevers, chills, chest pain, shortness of breath. Patient's most recent creatinines have been borderline normal to slightly outside of normal limits. He has been diagnosed with membranous nephrosis previously. Patient does have NNAMDI and uses CPAP on a nightly basis. Subjective:      REVIEW OF SYSTEMS:    A complete multi-organ review of systems was performed using a new patient questionnaire, and reviewed by me.   ENT:  negative except as noted in HPI  CONSTITUTIONAL:  negative except as noted in HPI  EYES:  negative except as noted in HPI  RESPIRATORY:  negative except as noted in HPI  CARDIOVASCULAR:  negative except as noted in HPI  GASTROINTESTINAL:  negative except as noted in HPI  GENITOURINARY:  negative except as noted in HPI  MUSCULOSKELETAL:  negative except as noted in HPI  SKIN:  negative except as noted in HPI  ENDOCRINE/METABOLIC: negative except as noted in HPI  HEMATOLOGIC/LYMPHATIC:  negative except as noted in HPI  ALLERGY/IMMUN: negative except as noted in HPI  NEUROLOGICAL:  negative except as noted in HPI  BEHAVIOR/PSYCH:  negative except as noted in HPI     Past Medical History:       Past Medical History:   Diagnosis Date    Arthritis      CAD (coronary artery disease)      CHF (congestive heart failure) (HCC)      Chronic kidney disease      Diabetes mellitus ([facility] [address]      Hyperlipidemia      Hypertension      Lower extremity edema      Membranous nephropathy determined by biopsy      Movement disorder      Obesity      Obstructive sleep apnea       on CPAP    Pneumonia      SOB (shortness of breath)           Social History:    TOBACCO:   reports that he has never smoked. He has never used smokeless tobacco.  ETOH:   reports current alcohol use of about 3.0 standard drinks of alcohol per week. DRUGS:   reports no history of drug use. Family History:             Problem Relation Age of Onset    High Blood Pressure Mother      Cancer Mother      Diabetes Mother      Other Father           blood clot    Diabetes Father      Arthritis Father      Stroke Father      Heart Disease Father      High Blood Pressure Sister      Diabetes Sister      Arthritis Sister           Surgical History:        Past Surgical History:   Procedure Laterality Date    CHOLECYSTECTOMY   1980'S    COLONOSCOPY   2016     Dr. [facility]   9/8/2020     COLONOSCOPY POLYPECTOMY SNARE/COLD BIOPSY performed by Colleen Medieros MD at [address] [place] [address] 12/2016     Dr. Julia Roman office    JOINT REPLACEMENT Bilateral 06/03/2014     total -  Dr. Sal[facility] @ [address]   10/04/2016     With Mesh - Dr. Manolo Alegria         Objective: This is a 73 y.o. male. Patient is alert and oriented to person, place and time. Patient appears well developed, well nourished. Mood is happy with normal affect. Not obviously hearing impaired. No abnormality in speech noted.      /84 (Site: Left Lower Arm, Position: Sitting) Pulse 64   Temp 97.2 °F (36.2 °C) (Infrared)   Resp 20   Ht 5' 11\" (1.803 m)   Wt (!) 358 lb 6.4 oz (162.6 kg)   SpO2 96%   BMI 49.99 kg/m²      Head:              Normocephalic, atraumatic. No obvious masses or lesions noted. Ears:  External ears: Normal: no scars, lesions or masses. Mastoid process: No erythema noted. No tenderness to palpation. R External auditory canal: clear and free of any pathology  L External auditory canal: clear and free of any pathology  Tympanic membranes:  R intact, translucent                                                        L intact, translucent                Nose:              External nose: Appears midline. No obvious deformity or masses. Septum:  deviated. No septal hematoma. No perforation. Mucosa:  clear  Turbinates: hypertrophic and congested            Discharge:  clear     Mouth/Throat:  Lips, tongue and oral cavity: Normal. No masses or lesions noted  Dentition: fair, with dental attrition of the mandibular teeth, especially incisors. Oral mucosa: moist  Tonsils: atrophic  Oropharynx: normal-appearing mucosa  Hard and soft palates: symmetrical and intact. Salivary glands: not enlarged and no tenderness to palpation. Uvula: midline, no obvious lesions              Gag reflex is present. Neck: Trachea midline. Thyroid not enlarged, no palpable masses or tenderness. Lymphatic: No cervical lymphadenopathy noted. Eyes: TEE, EOM intact. Conjunctiva moist without discharge. Lungs: Normal effort of breathing, not obviously distressed. Neuro: Cranial nerves II-XII grossly intact. Extremities: No clubbing, edema, or cyanosis noted.      Data:           Component Ref Range & Units 4/6/22 1233 2/28/22 1108 2/10/22 0735 12/23/21 0838 11/15/21 1539 9/25/21 0830 6/4/21 0730   Glucose 70 - 108 mg/dL 230 High    231 High   139 High   105  156 High       CREATININE 0.4 - 1.2 mg/dL 1.4 High    1.2  1.3 High   1.3 High   1.3 High   1.3 High     BUN 7 - 22 mg/dL 21   23 High   24 High   28 High   19  24 High     Sodium 135 - 145 meq/L 138   138  138  142  141  139    Potassium 3.5 - 5.2 meq/L 4.3   5.0  4.1  4.3  4.5  4.3    Chloride 98 - 111 meq/L 99   100  101  103  105  102    CO2 23 - 33 meq/L 25   25  25  24  27  26    Calcium 8.5 - 10.5 mg/dL 10.6 High  11.6 High  CM  11.3 High  CM  11.2 High   11.8 High   10.8 High   11.0 High     AST 5 - 40 U/L 55 High      59 High   47 High   41 High   37    Alkaline Phosphatase 38 - 126 U/L 119     109  109  96  83    Total Protein 6.1 - 8.0 g/dL 6.4     7.0  6.9  6.9  6.7    Albumin 3.5 - 5.1 g/dL 4.0     4.4  4.3  4.0  4.0    Total Bilirubin 0.3 - 1.2 mg/dL 0.5     0.6  0.6  0.6  0.6    ALT 11 - 66 U/L 67 High      62 CM  55 CM  46 CM  41 CM          Component Ref Range & Units 4/6/22 1233 2/10/22 0736 11/15/21 1539 9/25/21 0832   Pth Intact 15.0 - 65.0 pg/mL 83.0 High  84.4 High  CM  103.3 High  CM  100.1 High  CM       Radiology Review:      NM parathyroid scan 5/26/2022  FINDINGS: Immediate images demonstrate homogeneous distribution of radiotracer throughout the thyroid gland. There is a focus of radiotracer accumulation slightly inferior to the right lobe of the gland. This accumulation faintly persists on 2 and 4 hour    images. Impression       Persistent activity inferior to the right lobe of the thyroid gland suspicious for parathyroid adenoma. DEXA scan 4/13/2022-  FINDINGS:   Bone densitometry has been performed using a Hologic bone densitometer. This evaluation includes the lumbar spine and both hips. The right forearm was also imaged. Current exam:       Lumbar Spine: L1-L4   Bone mineral density (gm/cm2): 1.449, T-score: 3.3, Z-score: 4.0, This qualifies as normal bone mineral density. Femoral Neck Left:   Bone mineral density (gm/cm2): 1.013, T-score: 0.7, Z-score: 1.6, This qualifies as normal bone mineral density.        Femoral Neck Right:   Bone mineral density (gm/cm2): 0.904, T-score: -0.2, Z-score: 0.8, This qualifies as normal bone mineral density. Total Hip Left:   Bone mineral density (gm/cm2): 1.140, T-score: 0.7, Z-score: 1.2, This qualifies as normal bone mineral density. Total Hip Right:   Bone mineral density (gm/cm2): 1.202, T-score: 1.1, Z-score: 1.6, This qualifies as normal bone mineral density. Distal 3rd right forearm:   Bone mineral density (gm/cm2): 0.847, T-score: 0.6, Z-score: 1.7, This qualifies as normal bone mineral density. The patient does not qualify for a FRAX assessment because all T scores for the spine total, hip total, and femoral neck are at or above -1.0. Impression   This qualifies as normal bone mineral density according to the World Health Organization criteria. The patient is at a normal risk for fracture. Assessment/Plan:       Diagnosis Orders   1. Hyperparathyroidism [facility]) Miscellaneous Surgery   2. Membranous glomerulonephritis with nephrosis            The patient is a 73 y.o. male that presents for evaluation of hyperparathyroidism. Patient was examined by Dr. Feroz Roth and myself. We recommend proceeding with parathyroidectomy with possible 4 gland exploration. Potential risk/benefits of the procedure were discussed with the patient. Some the risk discussed include (but not limited to): Postop pain, postop infection, postop bleeding, nerve injury (discussed use of nerve integrity monitoring), hypocalcemia. We also discussed the possibility of his parathyroid hormone not dropping appropriately with intraoperative PTH checks that may require excision of more than 1 parathyroid gland. Patient expresses understanding and would like to proceed. He will contact the office in the meantime with new/worsening symptoms or other concerns.      Patient was examined and treatment plan was created in collaboration with Dr. Feroz Roth     (Please note that portions of this note may have been completed with a voice recognition program.  Efforts were made to edit the dictation but occasionally words are mis-transcribed.)     Electronically signed by SUZANNE Barba on 7/5/2022 at 10:17 AM

## 2022-07-28 NOTE — PROGRESS NOTES
[phone] pt arrived to PACU, awakens to voice. Denies pain. Site CDI  1330 orders from Dr Adria Norman to remove aleman. 50 ml yellow urine emptied  1336 accucheck 306, medicated with 8 units insulin  1341 pt awake in bed, denies pain. VSS  1345 pt c/o severe pain in L arm. Denies any chest pain/SOB or numbness and tingling in L arm. Able to move fingers and arm with slight difficulty. Dr Susi Alvares notified  [phone] Dr Susi Alvares at bedside, no new orders at this time  1400 pt awake in bed, denies pain in neck. VSS  1410 pt now c/o numbness in L arm. Dr Susi Alvares notified  [phone] Dr Susi Alvares at bedside, updated Dr Adria Norman and no new orders at this time.  VSS  1432 meets criteria for discharge from PACU, transported to [facility]

## 2022-07-28 NOTE — ANESTHESIA PRE PROCEDURE
Department of Anesthesiology  Preprocedure Note       Name:  Vandana Height   Age:  73 y.o.  :  1955                                          MRN:  [id number]         Date:  2022      Surgeon: Jessie Mohs):  Matt White MD    Procedure: Procedure(s):  Right Parathyroidectomy possible 4 Gland Exploration    Medications prior to admission:   Prior to Admission medications    Medication Sig Start Date End Date Taking? Authorizing Provider   metFORMIN (GLUCOPHAGE) 500 MG tablet Take 500 mg by mouth 2 times daily (with meals)    Historical Provider, MD   allopurinol (ZYLOPRIM) 100 MG tablet Take 200 mg by mouth daily    Historical Provider, MD   Finerenone (KERENDIA) 10 MG TABS Take by mouth    Historical Provider, MD   cinacalcet (SENSIPAR) 30 MG tablet Take 30 mg by mouth 2 times daily    Historical Provider, MD   vitamin C (ASCORBIC ACID) 500 MG tablet Take 500 mg by mouth daily    Historical Provider, MD   Cholecalciferol (VITAMIN D3) 1.25 MG (47077 UT) TABS Take by mouth Every     Historical Provider, MD   zinc 50 MG CAPS Take by mouth    Historical Provider, MD   glipiZIDE (GLUCOTROL) 5 MG tablet Take 15 mg by mouth every morning     Historical Provider, MD   aspirin 81 MG chewable tablet Take 81 mg by mouth daily    Historical Provider, MD   pravastatin (PRAVACHOL) 10 MG tablet Take 20 mg by mouth nightly     Historical Provider, MD   bumetanide (BUMEX) 2 MG tablet Take 2 mg by mouth 2 times daily. Historical Provider, MD   losartan (COZAAR) 100 MG tablet Take 100 mg by mouth daily.       Historical Provider, MD   potassium chloride SA (K-DUR;KLOR-CON) 20 MEQ tablet 1 tabs po twice daily    Historical Provider, MD       Current medications:    Current Facility-Administered Medications   Medication Dose Route Frequency Provider Last Rate Last Admin    0.9 % sodium chloride infusion   IntraVENous Continuous Matt White MD        ceFAZolin (ANCEF) 3000 mg in dextrose 5 % 100 mL IVPB  3,000 mg IntraVENous Once Vaibhav Murillo MD        dexamethasone (PF) (DECADRON) injection 10 mg  10 mg IntraVENous Once Vaibhav Murillo MD           Allergies:     Allergies   Allergen Reactions    Bactrim [Sulfamethoxazole-Trimethoprim]      Monitor closely due to kidney disease    Celebrex [Celecoxib]      Avoid due to kidney disease    Diclofenac      Avoid due to kidney disease    Dolobid [Diflunisal]      Avoid due to kidney disease    Etodolac      Avoid due to kidney disease    Fenoprofen      Avoid due to kidney disease    Gentamycin [Gentamicin]      Monitor closely due to kidney disease    Ibuprofen      Avoid due to kidney disease    Indocin [Indomethacin]      Avoid due to kidney disease    Iv Dye [Iodides]      Monitor closely due to kidney disease    Ketoprofen      Avoid due to kidney disease    Ketorolac      Avoid due to kidney disease    Meclofenamate      Avoid due to kidney disease    Mefenamic Acid      Avoid due to kidney disease    Meloxicam      Avoid due to kidney disease    Nabumetone      Avoid due to kidney disease    Naproxen      Avoid due to kidney disease    Nsaids Other (See Comments)     Avoid due to kidney disease    Oxaprozin      Avoid due to kidney disease    Piroxicam      Avoid due to kidney disease    Rofecoxib      Avoid due to kidney disease    Salsalate      Avoid due to kidney disease    Sulindac      Avoid due to kidney disease    Tetracyclines & Related      Monitor closely due to kidney disease    Tobramycin      Monitor closely due to kidney disease    Tolmetin      Avoid due to kidney disease    Valdecoxib      Avoid due to kidney disease    Vancomycin      Monitor closely due to kidney disease       Problem List:    Patient Active Problem List   Diagnosis Code    Hypertension I10    Hyperlipidemia E78.5    Obesity E66.9    SOB (shortness of breath) R06.02    Obstructive sleep apnea G47.33    Lower extremity edema R60.0  Membranous glomerulonephritis with nephrosis N04.2    Steroid-induced diabetes mellitus ([facility] [address] E09.9, T38.0X5A    CAP (community acquired pneumonia) J18.9    Acute-on-chronic kidney injury ([facility] [address] N17.9, N18.9    NNAMDI (obstructive sleep apnea) G47.33    Morbid obesity with BMI of 45.0-49.9, adult (HCC) E66.01, Z68.42    OA (osteoarthritis) of knee M17.10    S/P TKR (total knee replacement), bilateral Z96.659    Incarcerated umbilical hernia U34.3       Past Medical History:        Diagnosis Date    Arthritis     CAD (coronary artery disease)     [facility]     Chronic kidney disease     [facility]     Hyperlipidemia     Hypertension     Lower extremity edema     Membranous nephropathy determined by biopsy     Movement disorder     Obesity     Obstructive sleep apnea     on CPAP    Pneumonia     SOB (shortness of breath)        Past Surgical History:        Procedure Laterality Date    CHOLECYSTECTOMY  1980'S    COLONOSCOPY  2016    Dr. Yolie Jim COLONOSCOPY  9/8/2020    COLONOSCOPY POLYPECTOMY SNARE/COLD BIOPSY performed by Shin Bolton MD at [facility]   Selina Manzano Left 12/2016    Dr. Bryant Bunch office    JOINT REPLACEMENT Bilateral 06/03/2014    total -  [facility]. Mary Claros @ [facility]  10/04/2016    With Mesh - Dr. Leticia Dillard       Social History:    Social History     Tobacco Use    Smoking status: Never    Smokeless tobacco: Never   Substance Use Topics    Alcohol use:  Yes     Alcohol/week: 3.0 standard drinks     Types: 3 Cans of beer per week     Comment: RARE                                Counseling given: Not Answered      Vital Signs (Current):   Vitals:    07/20/22 0935 07/28/22 0600   BP:  (!) 141/77   Pulse:  55   Resp:  16   Temp:  97.4 °F (36.3 °C)   TempSrc:  Temporal   SpO2:  96%   Weight: (!) 365 lb (165.6 kg) (!) 352 lb (159.7 kg)   Height: 5' 11\" (1.803 m) 5' 11\" (1.803 m) BP Readings from Last 3 Encounters:   07/28/22 (!) 141/77   06/23/22 130/82   06/14/22 134/84       NPO Status: Time of last liquid consumption: 1800                        Time of last solid consumption: 1800                        Date of last liquid consumption: 07/27/22                        Date of last solid food consumption: 07/27/22    BMI:   Wt Readings from Last 3 Encounters:   07/28/22 (!) 352 lb (159.7 kg)   06/23/22 (!) 358 lb (162.4 kg)   06/14/22 (!) 358 lb 6.4 oz (162.6 kg)     Body mass index is 49.09 kg/m².     CBC:   Lab Results   Component Value Date/Time    WBC 8.2 06/15/2022 07:17 AM    RBC 4.98 06/15/2022 07:17 AM    HGB 14.6 06/15/2022 07:17 AM    HCT 45.8 06/15/2022 07:17 AM    MCV 92.0 06/15/2022 07:17 AM    RDW 13.8 03/20/2018 07:26 AM     06/15/2022 07:17 AM       CMP:   Lab Results   Component Value Date/Time     06/15/2022 07:17 AM    K 4.1 07/28/2022 06:24 AM     06/15/2022 07:17 AM    CO2 25 06/15/2022 07:17 AM    BUN 22 06/15/2022 07:17 AM    CREATININE 1.1 06/15/2022 07:17 AM    LABGLOM 67 06/15/2022 07:17 AM    GLUCOSE 178 06/15/2022 07:17 AM    GLUCOSE 113 03/29/2012 07:30 AM    PROT 6.3 06/15/2022 07:17 AM    CALCIUM 10.9 06/15/2022 07:20 AM    BILITOT 0.6 06/15/2022 07:17 AM    ALKPHOS 107 06/15/2022 07:17 AM    AST 63 06/15/2022 07:17 AM    ALT 59 06/15/2022 07:17 AM       POC Tests:   Recent Labs     07/28/22  0621   POCGLU 179*       Coags:   Lab Results   Component Value Date/Time    INR 0.98 08/10/2019 03:25 AM    APTT 35.6 08/10/2019 03:25 AM       HCG (If Applicable): No results found for: PREGTESTUR, PREGSERUM, HCG, HCGQUANT     ABGs: No results found for: PHART, PO2ART, YPH9CNC, EDY7VGN, BEART, E6NDPVHD     Type & Screen (If Applicable):  Lab Results   Component Value Date    LAB POS 06/03/2014       Drug/Infectious Status (If Applicable):  No results found for: HIV, HEPCAB    COVID-19 Screening (If Applicable): Lab Results   Component Value Date/Time    COVID19 Not Detected 12/04/2020 11:50 AM           Anesthesia Evaluation  Patient summary reviewed and Nursing notes reviewed no history of anesthetic complications:   Airway: Mallampati: III  TM distance: >3 FB   Neck ROM: full  Mouth opening: > = 3 FB   Dental:          Pulmonary:normal exam  breath sounds clear to auscultation  (+) pneumonia: resolved,  shortness of breath:  sleep apnea:                             Cardiovascular:  Exercise tolerance: good (>4 METS),   (+) hypertension:, CAD:, CHF:,       ECG reviewed                        Neuro/Psych:   Negative Neuro/Psych ROS              GI/Hepatic/Renal:   (+) morbid obesity          Endo/Other:    (+) DiabetesType II DM, , .          Pt had no PAT visit       Abdominal:   (+) obese,     Abdomen: soft. Vascular: negative vascular ROS. Other Findings:           Anesthesia Plan      general     ASA 3       Induction: intravenous. MIPS: Postoperative opioids intended and Prophylactic antiemetics administered. Anesthetic plan and risks discussed with patient and spouse. Plan discussed with CRNA.                     [address] [facility] Shahid, DO   7/28/2022

## 2022-07-28 NOTE — PROGRESS NOTES
RN reviewed ionized calcium level 1.21 with Dr. Shyla antonio per provider. Provider asking how patient doing overall. RN informed pt continues to have c/o arm discomfort given pain medication. Per provider requesting RN inform patient that discomfort is likely due to positioning during surgery and will improve. RN notified patient and wife.

## 2022-07-28 NOTE — DISCHARGE INSTRUCTIONS
Keep neck incision clean and dry. Empty drain and record output as shown by nurse. No heavy lifting or strenuous exercise. Pain medication as needed. No diet restrictions. Please contact ENT office at [phone] or Dr Diana Pierre directly at [phone].

## 2022-07-28 NOTE — ANESTHESIA POSTPROCEDURE EVALUATION
Department of Anesthesiology  Postprocedure Note    Patient: Norma Rivas  MRN: [id number]  YOB: 1955  Date of evaluation: 7/28/2022      Procedure Summary     Date: 07/28/22 Room / Location: [address] KRISTINA Quiñones    Anesthesia Start: 1881 Anesthesia Stop: 3370    Procedure: Right Parathyroidectomy with Gland Exploration (Right: Neck) Diagnosis:       Hyperparathyroidism ([address]      (Hyperparathyroidism ([address] [E21.3])    Surgeons: Cas Hobson MD Responsible Provider: Rodrigo Escobar DO    Anesthesia Type: general ASA Status: 3          Anesthesia Type: No value filed. Shree Phase I: Shree Score: 10    Shree Phase II: Shree Score: 10      Anesthesia Post Evaluation    Patient location during evaluation: PACU  Patient participation: complete - patient participated  Level of consciousness: awake and alert  Pain score: 4  Airway patency: patent  Nausea & Vomiting: no nausea and no vomiting  Complications: no  Cardiovascular status: hemodynamically stable and blood pressure returned to baseline  Respiratory status: spontaneous ventilation, room air and acceptable  Hydration status: stable  Comments: Pt c/o left elbow pain that is reproducible with palpation in the anterior space. Pt reports that it is \"deep in the joint\" and now has some radiating pain and paresthesia to the \"top of my wrist/hand. \"  There is no swelling, erythema, or changes to the skin. Pt denies any chest pain, SOB, or shoulder discomfort and states it is localized to the elbow and hand. Ice bag was placed but did not help reduce the pain. Will continue to monitor and medicate the patient for the pain. No vascular compromise noted. Dr. Jared Rene notified and will monitor the patient as well.

## 2022-07-28 NOTE — PROGRESS NOTES
ADMITTED TO [facility] AND ORIENTED TO UNIT. SCDS ON. FALL AND ALLERGY BANDS ON. PT VERBALIZED APPROVAL FOR FIRST NAME, LAST INITIAL AND PHYSICIAN NAME ON UNIT WHITEBOARD.  Wife, Norma Cassidy, at bedside

## 2022-08-03 ENCOUNTER — OFFICE VISIT (OUTPATIENT)
Dept: ENT CLINIC | Age: 67
End: 2022-08-03

## 2022-08-03 VITALS
TEMPERATURE: 97.9 F | BODY MASS INDEX: 44.1 KG/M2 | DIASTOLIC BLOOD PRESSURE: 88 MMHG | SYSTOLIC BLOOD PRESSURE: 138 MMHG | WEIGHT: 315 LBS | OXYGEN SATURATION: 95 % | HEIGHT: 71 IN | RESPIRATION RATE: 16 BRPM | HEART RATE: 69 BPM

## 2022-08-03 DIAGNOSIS — S44.12XA INJURY OF MEDIAN NERVE AT LEFT UPPER ARM LEVEL, INITIAL ENCOUNTER: ICD-10-CM

## 2022-08-03 DIAGNOSIS — E21.3 HYPERPARATHYROIDISM (HCC): Primary | ICD-10-CM

## 2022-08-03 PROCEDURE — 99024 POSTOP FOLLOW-UP VISIT: CPT | Performed by: OTOLARYNGOLOGY

## 2022-08-03 NOTE — PROGRESS NOTES
[address] EAR, NOSE AND THROAT  [facility] [place]  Dept: [phone]  Dept Fax: [phone]  Loc: [phone]    Paulina Richard is a 73 y.o. male who was referred by No ref. provider found for:  Chief Complaint   Patient presents with    Post-Op Check     Patient is here for post op parathyroidectomy 07/28/22. He states that his left hand is numb and has been since surgery. He was having some numbness in the arm, but he can now lift the arm. HPI:     Paulina Richard is a 73 y.o. male approximately 5 days status post parathyroid adenoma resection and 2 gland additional explorations. The patient's operation was hampered by inaccurate PTH levels from the lab that went up instead of down. The patient reports doing well following the operation with the exception of left arm numbness and pain that has improved since the operation. Initially his entire hand was numb. Now he only complains of numbness from thumb to middle finger. He denies any elbow and neck pain. He has no history of cervical disc disease that he is aware of. He has had no problems with his wound and his drain outputs have been modest over the last 48 hours. Low single-digit output has been noted for the last 2 days. History:      Allergies   Allergen Reactions    Bactrim [Sulfamethoxazole-Trimethoprim]      Monitor closely due to kidney disease    Celebrex [Celecoxib]      Avoid due to kidney disease    Diclofenac      Avoid due to kidney disease    Dolobid [Diflunisal]      Avoid due to kidney disease    Etodolac      Avoid due to kidney disease    Fenoprofen      Avoid due to kidney disease    Gentamycin [Gentamicin]      Monitor closely due to kidney disease    Ibuprofen      Avoid due to kidney disease    Indocin [Indomethacin]      Avoid due to kidney disease    Iv Dye [Iodides]      Monitor closely due to kidney disease    Ketoprofen      Avoid due to kidney disease    Ketorolac      Avoid due to kidney disease    Meclofenamate      Avoid due to kidney disease    Mefenamic Acid      Avoid due to kidney disease    Meloxicam      Avoid due to kidney disease    Nabumetone      Avoid due to kidney disease    Naproxen      Avoid due to kidney disease    Nsaids Other (See Comments)     Avoid due to kidney disease    Oxaprozin      Avoid due to kidney disease    Piroxicam      Avoid due to kidney disease    Rofecoxib      Avoid due to kidney disease    Salsalate      Avoid due to kidney disease    Sulindac      Avoid due to kidney disease    Tetracyclines & Related      Monitor closely due to kidney disease    Tobramycin      Monitor closely due to kidney disease    Tolmetin      Avoid due to kidney disease    Valdecoxib      Avoid due to kidney disease    Vancomycin      Monitor closely due to kidney disease     Current Outpatient Medications   Medication Sig Dispense Refill    amoxicillin (AMOXIL) 500 MG capsule Take 1 capsule by mouth in the morning and 1 capsule before bedtime. Do all this for 10 days. 20 capsule 0    metFORMIN (GLUCOPHAGE) 500 MG tablet Take 500 mg by mouth 2 times daily (with meals)      allopurinol (ZYLOPRIM) 100 MG tablet Take 200 mg by mouth daily      Finerenone (KERENDIA) 10 MG TABS Take by mouth      vitamin C (ASCORBIC ACID) 500 MG tablet Take 500 mg by mouth daily      Cholecalciferol (VITAMIN D3) 1.25 MG (36431 UT) TABS Take by mouth Every Sunday      zinc 50 MG CAPS Take by mouth      glipiZIDE (GLUCOTROL) 5 MG tablet Take 15 mg by mouth every morning       aspirin 81 MG chewable tablet Take 81 mg by mouth daily      pravastatin (PRAVACHOL) 10 MG tablet Take 20 mg by mouth nightly       bumetanide (BUMEX) 2 MG tablet Take 2 mg by mouth 2 times daily. losartan (COZAAR) 100 MG tablet Take 100 mg by mouth daily.         potassium chloride SA (K-DUR;KLOR-CON) 20 MEQ tablet 1 tabs po twice daily       No current facility-administered medications for this visit. Past Medical History:   Diagnosis Date    Arthritis     CAD (coronary artery disease)     CHF (congestive heart failure) (HCC)     Chronic kidney disease     Diabetes mellitus ([address]     Hyperlipidemia     Hypertension     Lower extremity edema     Membranous nephropathy determined by biopsy     Movement disorder     Obesity     Obstructive sleep apnea     on CPAP    Pneumonia     SOB (shortness of breath)       Past Surgical History:   Procedure Laterality Date    CHOLECYSTECTOMY  1980'S    COLONOSCOPY  2016    Dr. Xochitl Nair    COLONOSCOPY  9/8/2020    COLONOSCOPY POLYPECTOMY SNARE/COLD BIOPSY performed by Laury Ramirez MD at [facility] Left 12/2016    Dr. Persaud Handler office    JOINT REPLACEMENT Bilateral 06/03/2014    total -  Dr. Seven Gamez @ [address]      PARATHYROID GLAND SURGERY Right 7/28/2022    Right Parathyroidectomy with Gland Exploration performed by Silva Barrow MD at [address]  10/04/2016    With Mesh - Dr. Leelee Frost     Family History   Problem Relation Age of Onset    High Blood Pressure Mother     Cancer Mother     Diabetes Mother     Other Father         blood clot    Diabetes Father     Arthritis Father     Stroke Father     Heart Disease Father     High Blood Pressure Sister     Diabetes Sister     Arthritis Sister      Social History     Tobacco Use    Smoking status: Never    Smokeless tobacco: Never   Substance Use Topics    Alcohol use: Yes     Alcohol/week: 3.0 standard drinks     Types: 3 Cans of beer per week     Comment: RARE        Subjective:      Review of Systems  Rest of review of systems are negative, except as noted in HPI.      Objective:     /88 (Site: Right Upper Arm, Position: Sitting)   Pulse 69   Temp 97.9 °F (36.6 °C) (Infrared)   Resp 16   Ht 5' 11\" (1.803 m)   Wt (!) 353 lb 1.6 oz (160.2 kg)   SpO2 95%   BMI 49.25 kg/m²     Physical Exam       On general physical exam the patient is pleasant alert and cooperative well-appearing man in no acute distress. His wound is clean dry and intact. His bulb is holding vacuum and what is in it is a few cc of clear marion fluid. I cleansed the base of the drain, clipped the drain suture and delivered the drain in 1 piece without difficulty. I applied antibiotic ointment and a Band-Aid to the wound. The patient tolerated the drain removal well. Vitals reviewed. No results found.    Lab Results   Component Value Date/Time     06/15/2022 07:17 AM     04/06/2022 12:33 PM     02/10/2022 07:35 AM    K 4.1 07/28/2022 06:24 AM    K 4.2 06/15/2022 07:17 AM    K 4.3 04/06/2022 12:33 PM     06/15/2022 07:17 AM    CL 99 04/06/2022 12:33 PM     02/10/2022 07:35 AM    CO2 25 06/15/2022 07:17 AM    CO2 25 04/06/2022 12:33 PM    CO2 25 02/10/2022 07:35 AM    BUN 22 06/15/2022 07:17 AM    BUN 21 04/06/2022 12:33 PM    BUN 23 02/10/2022 07:35 AM    CREATININE 1.1 06/15/2022 07:17 AM    CREATININE 1.4 04/06/2022 12:33 PM    CREATININE 1.2 02/10/2022 07:35 AM    CALCIUM 10.5 07/28/2022 02:33 PM    CALCIUM 10.2 07/28/2022 12:10 PM    CALCIUM 10.4 07/28/2022 09:40 AM    PROT 6.3 06/15/2022 07:17 AM    PROT 6.4 04/06/2022 12:33 PM    PROT 7.0 12/23/2021 08:38 AM    LABALBU 4.3 06/15/2022 07:17 AM    LABALBU 4.0 04/06/2022 12:33 PM    LABALBU 4.4 12/23/2021 08:38 AM    LABALBU 4.1 07/21/2011 07:24 AM    BILITOT 0.6 06/15/2022 07:17 AM    BILITOT 0.5 04/06/2022 12:33 PM    BILITOT 0.6 12/23/2021 08:38 AM    ALKPHOS 107 06/15/2022 07:17 AM    ALKPHOS 119 04/06/2022 12:33 PM    ALKPHOS 109 12/23/2021 08:38 AM    AST 63 06/15/2022 07:17 AM    AST 55 04/06/2022 12:33 PM    AST 59 12/23/2021 08:38 AM    ALT 59 06/15/2022 07:17 AM    ALT 67 04/06/2022 12:33 PM    ALT 62 12/23/2021 08:38 AM       All of the past medical history, past surgical history, family history,social history, allergies and current medications were reviewed with the patient. Assessment & Plan   Diagnoses and all orders for this visit:     Diagnosis Orders   1. Hyperparathyroidism ([address]  PTH, Intact    Calcium      2. 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  I will recheck his PTH and calcium levels in the next few days and again prior to his return to clinic in approximately 2 months. Hopefully this will demonstrate that he has had a sufficient improvement in his PTH levels with the removal of the adenoma and does not require a reexploration. Regarding his left arm numbness, his lateral arm now has numbness that could only be explained by either median nerve palsy or cervical disc disease. We will give it another couple of weeks to resolve spontaneously. Otherwise I will refer him to orthopedics to evaluate his cervical spine. He reported understanding the basis of my recommendations and being willing to proceed as such. Return in about 2 months (around 10/3/2022) for Follow-up evaluation and chemistries. **This report has been created using voice recognition software. It may contain minor errors which are inherent in voice recognition technology. **

## 2022-08-16 ENCOUNTER — HOSPITAL ENCOUNTER (OUTPATIENT)
Age: 67
Discharge: HOME | End: 2022-08-16
Payer: MEDICARE

## 2022-08-16 DIAGNOSIS — E21.3 HYPERPARATHYROIDISM (HCC): ICD-10-CM

## 2022-08-16 LAB
BASOPHILS # BLD: 0.9 %
BASOPHILS ABSOLUTE: 0.1 THOU/MM3 (ref 0–0.1)
BUN, WHOLE BLOOD: 27 MG/DL (ref 8–26)
CALCIUM SERPL-MCNC: 11 MG/DL (ref 8.5–10.5)
CHLORIDE, WHOLE BLOOD: 105 MEQ/L (ref 98–109)
CREAT SERPL-MCNC: 1.5 MG/DL (ref 0.5–1.2)
CREATININE URINE: 83.8 MG/DL
CREATININE, URINE: 83.8 MG/DL
EOSINOPHIL # BLD: 4.3 %
EOSINOPHILS ABSOLUTE: 0.3 THOU/MM3 (ref 0–0.4)
ERYTHROCYTE [DISTWIDTH] IN BLOOD BY AUTOMATED COUNT: 13.5 % (ref 11.5–14.5)
ERYTHROCYTE [DISTWIDTH] IN BLOOD BY AUTOMATED COUNT: 45.4 FL (ref 35–45)
GFR, ESTIMATED: 50 ML/MIN/1.73M2
GLUCOSE, WHOLE BLOOD: 138 MG/DL (ref 70–108)
HCT VFR BLD CALC: 43.1 % (ref 42–52)
HEMOGLOBIN: 13.6 GM/DL (ref 14–18)
IMMATURE GRANS (ABS): 0.12 THOU/MM3 (ref 0–0.07)
IMMATURE GRANULOCYTES: 1.5 %
LYMPHOCYTES # BLD: 25.2 %
LYMPHOCYTES ABSOLUTE: 2 THOU/MM3 (ref 1–4.8)
MAGNESIUM: 2.2 MG/DL (ref 1.6–2.4)
MCH RBC QN AUTO: 29.1 PG (ref 26–33)
MCHC RBC AUTO-ENTMCNC: 31.6 GM/DL (ref 32.2–35.5)
MCV RBC AUTO: 92.3 FL (ref 80–94)
MICROALBUMIN UR-MCNC: 52.47 MG/DL
MICROALBUMIN/CREAT UR-RTO: 626 MG/G (ref 0–30)
MONOCYTES # BLD: 9.6 %
MONOCYTES ABSOLUTE: 0.7 THOU/MM3 (ref 0.4–1.3)
NUCLEATED RED BLOOD CELLS: 0 /100 WBC
PLATELET # BLD: 209 THOU/MM3 (ref 130–400)
PMV BLD AUTO: 11.5 FL (ref 9.4–12.4)
POTASSIUM, WHOLE BLOOD: 4 MEQ/L (ref 3.5–4.9)
PROT/CREAT RATIO, UR: 0.99
PROTEIN, URINE: 82.6 MG/DL
PTH INTACT: 89.5 PG/ML (ref 15–65)
RBC # BLD: 4.67 MILL/MM3 (ref 4.7–6.1)
SEG NEUTROPHILS: 58.5 %
SEGMENTED NEUTROPHILS ABSOLUTE COUNT: 4.6 THOU/MM3 (ref 1.8–7.7)
SODIUM BLD-SCNC: 140 MEQ/L (ref 138–146)
TOTAL CO2, WHOLE BLOOD: 27 MEQ/L (ref 23–33)
URIC ACID: 8.5 MG/DL (ref 3.7–7)
WBC # BLD: 7.8 THOU/MM3 (ref 4.8–10.8)

## 2022-08-16 PROCEDURE — 84156 ASSAY OF PROTEIN URINE: CPT

## 2022-08-16 PROCEDURE — 82570 ASSAY OF URINE CREATININE: CPT

## 2022-08-16 PROCEDURE — 36415 COLL VENOUS BLD VENIPUNCTURE: CPT

## 2022-08-16 PROCEDURE — 83735 ASSAY OF MAGNESIUM: CPT

## 2022-08-16 PROCEDURE — 80051 ELECTROLYTE PANEL: CPT

## 2022-08-16 PROCEDURE — 84550 ASSAY OF BLOOD/URIC ACID: CPT

## 2022-08-16 PROCEDURE — 83970 ASSAY OF PARATHORMONE: CPT

## 2022-08-16 PROCEDURE — 82947 ASSAY GLUCOSE BLOOD QUANT: CPT

## 2022-08-16 PROCEDURE — 82043 UR ALBUMIN QUANTITATIVE: CPT

## 2022-08-16 PROCEDURE — 84520 ASSAY OF UREA NITROGEN: CPT

## 2022-08-16 PROCEDURE — 85025 COMPLETE CBC W/AUTO DIFF WBC: CPT

## 2022-08-16 PROCEDURE — 82565 ASSAY OF CREATININE: CPT

## 2022-08-16 PROCEDURE — 82310 ASSAY OF CALCIUM: CPT

## 2022-09-16 ENCOUNTER — HOSPITAL ENCOUNTER (OUTPATIENT)
Age: 67
Discharge: HOME | End: 2022-09-16
Payer: MEDICARE

## 2022-09-16 LAB
ALBUMIN SERPL-MCNC: 4.4 G/DL (ref 3.5–5.1)
ALP BLD-CCNC: 101 U/L (ref 38–126)
ALT SERPL-CCNC: 51 U/L (ref 11–66)
ANION GAP SERPL CALCULATED.3IONS-SCNC: 10 MEQ/L (ref 8–16)
AST SERPL-CCNC: 51 U/L (ref 5–40)
AVERAGE GLUCOSE: 144 MG/DL (ref 70–126)
BACTERIA: ABNORMAL
BASOPHILS # BLD: 1 %
BASOPHILS ABSOLUTE: 0.1 THOU/MM3 (ref 0–0.1)
BILIRUB SERPL-MCNC: 0.6 MG/DL (ref 0.3–1.2)
BILIRUBIN URINE: NEGATIVE
BLOOD, URINE: NEGATIVE
BUN BLDV-MCNC: 29 MG/DL (ref 7–22)
CALCIUM SERPL-MCNC: 10.9 MG/DL (ref 8.5–10.5)
CASTS: ABNORMAL /LPF
CASTS: ABNORMAL /LPF
CHARACTER, URINE: CLEAR
CHLORIDE BLD-SCNC: 102 MEQ/L (ref 98–111)
CO2: 26 MEQ/L (ref 23–33)
COLOR: YELLOW
CREAT SERPL-MCNC: 1.3 MG/DL (ref 0.4–1.2)
CREATININE URINE: 53.3 MG/DL
CRYSTALS: ABNORMAL
EOSINOPHIL # BLD: 5 %
EOSINOPHILS ABSOLUTE: 0.4 THOU/MM3 (ref 0–0.4)
EPITHELIAL CELLS, UA: ABNORMAL /HPF
ERYTHROCYTE [DISTWIDTH] IN BLOOD BY AUTOMATED COUNT: 13.4 % (ref 11.5–14.5)
ERYTHROCYTE [DISTWIDTH] IN BLOOD BY AUTOMATED COUNT: 43.8 FL (ref 35–45)
GFR SERPL CREATININE-BSD FRML MDRD: 55 ML/MIN/1.73M2
GLUCOSE BLD-MCNC: 157 MG/DL (ref 70–108)
GLUCOSE, URINE: NEGATIVE MG/DL
HBA1C MFR BLD: 6.8 % (ref 4.4–6.4)
HCT VFR BLD CALC: 44.8 % (ref 42–52)
HEMOGLOBIN: 14.5 GM/DL (ref 14–18)
IMMATURE GRANS (ABS): 0.15 THOU/MM3 (ref 0–0.07)
IMMATURE GRANULOCYTES: 1.7 %
IONIZED CALCIUM, WHOLE BLOOD: 1.37 MMOL/L (ref 1.12–1.32)
KETONES, URINE: NEGATIVE
LEUKOCYTE ESTERASE, URINE: NEGATIVE
LYMPHOCYTES # BLD: 18.6 %
LYMPHOCYTES ABSOLUTE: 1.6 THOU/MM3 (ref 1–4.8)
MAGNESIUM: 2.1 MG/DL (ref 1.6–2.4)
MCH RBC QN AUTO: 29.2 PG (ref 26–33)
MCHC RBC AUTO-ENTMCNC: 32.4 GM/DL (ref 32.2–35.5)
MCV RBC AUTO: 90.3 FL (ref 80–94)
MISCELLANEOUS LAB TEST RESULT: ABNORMAL
MONOCYTES # BLD: 9.5 %
MONOCYTES ABSOLUTE: 0.8 THOU/MM3 (ref 0.4–1.3)
NITRITE, URINE: NEGATIVE
NUCLEATED RED BLOOD CELLS: 0 /100 WBC
PH UA: 5.5 (ref 5–9)
PHOSPHORUS: 2.5 MG/DL (ref 2.4–4.7)
PLATELET # BLD: 238 THOU/MM3 (ref 130–400)
PMV BLD AUTO: 11.2 FL (ref 9.4–12.4)
POTASSIUM SERPL-SCNC: 3.9 MEQ/L (ref 3.5–5.2)
PROT/CREAT RATIO, UR: 1.02
PROTEIN UA: 100 MG/DL
PROTEIN, URINE: 54.1 MG/DL
PTH INTACT: 74.3 PG/ML (ref 15–65)
RBC # BLD: 4.96 MILL/MM3 (ref 4.7–6.1)
RBC URINE: ABNORMAL /HPF
RENAL EPITHELIAL, UA: ABNORMAL
SEG NEUTROPHILS: 64.2 %
SEGMENTED NEUTROPHILS ABSOLUTE COUNT: 5.5 THOU/MM3 (ref 1.8–7.7)
SODIUM BLD-SCNC: 138 MEQ/L (ref 135–145)
SPECIFIC GRAVITY UA: 1.01 (ref 1–1.03)
TOTAL PROTEIN: 6.9 G/DL (ref 6.1–8)
UROBILINOGEN, URINE: 0.2 EU/DL (ref 0–1)
VITAMIN D 25-HYDROXY: 41 NG/ML (ref 30–100)
WBC # BLD: 8.6 THOU/MM3 (ref 4.8–10.8)
WBC UA: ABNORMAL /HPF
YEAST: ABNORMAL

## 2022-09-16 PROCEDURE — 84156 ASSAY OF PROTEIN URINE: CPT

## 2022-09-16 PROCEDURE — 83970 ASSAY OF PARATHORMONE: CPT

## 2022-09-16 PROCEDURE — 36415 COLL VENOUS BLD VENIPUNCTURE: CPT

## 2022-09-16 PROCEDURE — 82570 ASSAY OF URINE CREATININE: CPT

## 2022-09-16 PROCEDURE — 82330 ASSAY OF CALCIUM: CPT

## 2022-09-16 PROCEDURE — 83735 ASSAY OF MAGNESIUM: CPT

## 2022-09-16 PROCEDURE — 83036 HEMOGLOBIN GLYCOSYLATED A1C: CPT

## 2022-09-16 PROCEDURE — 85025 COMPLETE CBC W/AUTO DIFF WBC: CPT

## 2022-09-16 PROCEDURE — 84100 ASSAY OF PHOSPHORUS: CPT

## 2022-09-16 PROCEDURE — 82306 VITAMIN D 25 HYDROXY: CPT

## 2022-09-16 PROCEDURE — 80053 COMPREHEN METABOLIC PANEL: CPT

## 2022-09-16 PROCEDURE — 81001 URINALYSIS AUTO W/SCOPE: CPT

## 2022-10-20 ENCOUNTER — PROCEDURE VISIT (OUTPATIENT)
Dept: NEUROLOGY | Age: 67
End: 2022-10-20
Payer: MEDICARE

## 2022-10-20 DIAGNOSIS — R20.0 LEFT ARM NUMBNESS: ICD-10-CM

## 2022-10-20 DIAGNOSIS — M54.12 CERVICAL RADICULOPATHY: ICD-10-CM

## 2022-10-20 DIAGNOSIS — G56.02 LEFT CARPAL TUNNEL SYNDROME: Primary | ICD-10-CM

## 2022-10-20 PROCEDURE — 95886 MUSC TEST DONE W/N TEST COMP: CPT | Performed by: PSYCHIATRY & NEUROLOGY

## 2022-10-20 PROCEDURE — 95909 NRV CNDJ TST 5-6 STUDIES: CPT | Performed by: PSYCHIATRY & NEUROLOGY

## 2022-10-24 ENCOUNTER — HOSPITAL ENCOUNTER (OUTPATIENT)
Age: 67
Discharge: HOME | End: 2022-10-24
Payer: MEDICARE

## 2022-10-24 LAB
BUN, WHOLE BLOOD: 29 MG/DL (ref 8–26)
CHLORIDE, WHOLE BLOOD: 101 MEQ/L (ref 98–109)
CREAT SERPL-MCNC: 1.5 MG/DL (ref 0.5–1.2)
GFR SERPL CREATININE-BSD FRML MDRD: 51 ML/MIN/1.73M2
GLUCOSE, WHOLE BLOOD: 197 MG/DL (ref 70–108)
IONIZED CALCIUM, WHOLE BLOOD: 1.44 MMOL/L (ref 1.12–1.32)
POTASSIUM, WHOLE BLOOD: 4 MEQ/L (ref 3.5–4.9)
SODIUM BLD-SCNC: 136 MEQ/L (ref 138–146)
TOTAL CO2, WHOLE BLOOD: 24 MEQ/L (ref 23–33)

## 2022-10-24 PROCEDURE — 82330 ASSAY OF CALCIUM: CPT

## 2022-10-24 PROCEDURE — 82565 ASSAY OF CREATININE: CPT

## 2022-10-24 PROCEDURE — 80051 ELECTROLYTE PANEL: CPT

## 2022-10-24 PROCEDURE — 82947 ASSAY GLUCOSE BLOOD QUANT: CPT

## 2022-10-24 PROCEDURE — 84520 ASSAY OF UREA NITROGEN: CPT

## 2022-11-05 ENCOUNTER — HOSPITAL ENCOUNTER (OUTPATIENT)
Age: 67
Discharge: HOME | End: 2022-11-05
Payer: MEDICARE

## 2022-11-05 LAB
ANION GAP SERPL CALCULATED.3IONS-SCNC: 16 MEQ/L (ref 8–16)
BUN BLDV-MCNC: 21 MG/DL (ref 7–22)
CALCIUM SERPL-MCNC: 9.7 MG/DL (ref 8.5–10.5)
CHLORIDE BLD-SCNC: 99 MEQ/L (ref 98–111)
CO2: 22 MEQ/L (ref 23–33)
CREAT SERPL-MCNC: 1.4 MG/DL (ref 0.4–1.2)
GFR SERPL CREATININE-BSD FRML MDRD: 55 ML/MIN/1.73M2
GLUCOSE BLD-MCNC: 232 MG/DL (ref 70–108)
IONIZED CALCIUM, WHOLE BLOOD: 1.29 MMOL/L (ref 1.12–1.32)
POTASSIUM SERPL-SCNC: 4.1 MEQ/L (ref 3.5–5.2)
SODIUM BLD-SCNC: 137 MEQ/L (ref 135–145)

## 2022-11-05 PROCEDURE — 82330 ASSAY OF CALCIUM: CPT

## 2022-11-05 PROCEDURE — 80048 BASIC METABOLIC PNL TOTAL CA: CPT

## 2022-11-05 PROCEDURE — 36415 COLL VENOUS BLD VENIPUNCTURE: CPT

## 2022-12-15 ENCOUNTER — HOSPITAL ENCOUNTER (OUTPATIENT)
Age: 67
Discharge: HOME | End: 2022-12-15
Payer: MEDICARE

## 2022-12-15 DIAGNOSIS — E21.3 HYPERPARATHYROIDISM (HCC): ICD-10-CM

## 2022-12-15 LAB
ALBUMIN SERPL-MCNC: 4.3 G/DL (ref 3.5–5.1)
ALP BLD-CCNC: 97 U/L (ref 38–126)
ALT SERPL-CCNC: 66 U/L (ref 11–66)
ANION GAP SERPL CALCULATED.3IONS-SCNC: 18 MEQ/L (ref 8–16)
AST SERPL-CCNC: 63 U/L (ref 5–40)
AVERAGE GLUCOSE: 177 MG/DL (ref 70–126)
BACTERIA: ABNORMAL /HPF
BASOPHILS # BLD: 0.8 %
BASOPHILS ABSOLUTE: 0.1 THOU/MM3 (ref 0–0.1)
BILIRUB SERPL-MCNC: 0.6 MG/DL (ref 0.3–1.2)
BILIRUBIN URINE: NEGATIVE
BLOOD, URINE: NEGATIVE
BUN BLDV-MCNC: 28 MG/DL (ref 7–22)
CALCIUM SERPL-MCNC: 10.7 MG/DL (ref 8.5–10.5)
CASTS 2: ABNORMAL /LPF
CASTS UA: ABNORMAL /LPF
CHARACTER, URINE: CLEAR
CHLORIDE BLD-SCNC: 102 MEQ/L (ref 98–111)
CHOLESTEROL, TOTAL: 184 MG/DL (ref 100–199)
CO2: 23 MEQ/L (ref 23–33)
COLOR: YELLOW
CREAT SERPL-MCNC: 1.4 MG/DL (ref 0.4–1.2)
CREATININE URINE: 41 MG/DL
CREATININE, URINE: 41 MG/DL
CRYSTALS, UA: ABNORMAL
EOSINOPHIL # BLD: 3.8 %
EOSINOPHILS ABSOLUTE: 0.4 THOU/MM3 (ref 0–0.4)
EPITHELIAL CELLS, UA: ABNORMAL /HPF
ERYTHROCYTE [DISTWIDTH] IN BLOOD BY AUTOMATED COUNT: 13.8 % (ref 11.5–14.5)
ERYTHROCYTE [DISTWIDTH] IN BLOOD BY AUTOMATED COUNT: 45.1 FL (ref 35–45)
GFR SERPL CREATININE-BSD FRML MDRD: 55 ML/MIN/1.73M2
GLUCOSE BLD-MCNC: 108 MG/DL (ref 70–108)
GLUCOSE URINE: NEGATIVE MG/DL
HBA1C MFR BLD: 7.9 % (ref 4.4–6.4)
HCT VFR BLD CALC: 46.6 % (ref 42–52)
HDLC SERPL-MCNC: 40 MG/DL
HEMOGLOBIN: 15.4 GM/DL (ref 14–18)
IMMATURE GRANS (ABS): 0.11 THOU/MM3 (ref 0–0.07)
IMMATURE GRANULOCYTES: 1 %
IONIZED CALCIUM, WHOLE BLOOD: 1.28 MMOL/L (ref 1.12–1.32)
KETONES, URINE: NEGATIVE
LDL CHOLESTEROL CALCULATED: 108 MG/DL
LEUKOCYTE ESTERASE, URINE: NEGATIVE
LYMPHOCYTES # BLD: 19.4 %
LYMPHOCYTES ABSOLUTE: 2.1 THOU/MM3 (ref 1–4.8)
MAGNESIUM: 2 MG/DL (ref 1.6–2.4)
MCH RBC QN AUTO: 29.7 PG (ref 26–33)
MCHC RBC AUTO-ENTMCNC: 33 GM/DL (ref 32.2–35.5)
MCV RBC AUTO: 89.8 FL (ref 80–94)
MICROALBUMIN UR-MCNC: 39.47 MG/DL
MICROALBUMIN/CREAT UR-RTO: 963 MG/G (ref 0–30)
MISCELLANEOUS 2: ABNORMAL
MONOCYTES # BLD: 8.4 %
MONOCYTES ABSOLUTE: 0.9 THOU/MM3 (ref 0.4–1.3)
NITRITE, URINE: NEGATIVE
NUCLEATED RED BLOOD CELLS: 0 /100 WBC
PH UA: 6.5 (ref 5–9)
PLATELET # BLD: 270 THOU/MM3 (ref 130–400)
PMV BLD AUTO: 10.4 FL (ref 9.4–12.4)
POTASSIUM SERPL-SCNC: 4 MEQ/L (ref 3.5–5.2)
PROSTATE SPECIFIC ANTIGEN: 0.91 NG/ML (ref 0–1)
PROT/CREAT RATIO, UR: 1.44
PROTEIN UA: 100
PROTEIN, URINE: 59 MG/DL
PTH INTACT: 53.8 PG/ML (ref 15–65)
RBC # BLD: 5.19 MILL/MM3 (ref 4.7–6.1)
RBC URINE: ABNORMAL /HPF
RENAL EPITHELIAL, UA: ABNORMAL
SEG NEUTROPHILS: 66.6 %
SEGMENTED NEUTROPHILS ABSOLUTE COUNT: 7.3 THOU/MM3 (ref 1.8–7.7)
SODIUM BLD-SCNC: 143 MEQ/L (ref 135–145)
SPECIFIC GRAVITY, URINE: 1.01 (ref 1–1.03)
TOTAL PROTEIN: 7.3 G/DL (ref 6.1–8)
TRIGL SERPL-MCNC: 179 MG/DL (ref 0–199)
UROBILINOGEN, URINE: 0.2 EU/DL (ref 0–1)
VITAMIN D 25-HYDROXY: 38 NG/ML (ref 30–100)
WBC # BLD: 11 THOU/MM3 (ref 4.8–10.8)
WBC UA: ABNORMAL /HPF
YEAST: ABNORMAL

## 2022-12-15 PROCEDURE — 82330 ASSAY OF CALCIUM: CPT

## 2022-12-15 PROCEDURE — G0103 PSA SCREENING: HCPCS

## 2022-12-15 PROCEDURE — 81001 URINALYSIS AUTO W/SCOPE: CPT

## 2022-12-15 PROCEDURE — 36415 COLL VENOUS BLD VENIPUNCTURE: CPT

## 2022-12-15 PROCEDURE — 82306 VITAMIN D 25 HYDROXY: CPT

## 2022-12-15 PROCEDURE — 83036 HEMOGLOBIN GLYCOSYLATED A1C: CPT

## 2022-12-15 PROCEDURE — 82570 ASSAY OF URINE CREATININE: CPT

## 2022-12-15 PROCEDURE — 83970 ASSAY OF PARATHORMONE: CPT

## 2022-12-15 PROCEDURE — 85025 COMPLETE CBC W/AUTO DIFF WBC: CPT

## 2022-12-15 PROCEDURE — 80053 COMPREHEN METABOLIC PANEL: CPT

## 2022-12-15 PROCEDURE — 84156 ASSAY OF PROTEIN URINE: CPT

## 2022-12-15 PROCEDURE — 80061 LIPID PANEL: CPT

## 2022-12-15 PROCEDURE — 82043 UR ALBUMIN QUANTITATIVE: CPT

## 2022-12-15 PROCEDURE — 83735 ASSAY OF MAGNESIUM: CPT

## 2023-01-30 ENCOUNTER — OFFICE VISIT (OUTPATIENT)
Dept: ENT CLINIC | Age: 68
End: 2023-01-30
Payer: MEDICARE

## 2023-01-30 ENCOUNTER — NURSE ONLY (OUTPATIENT)
Dept: LAB | Age: 68
End: 2023-01-30

## 2023-01-30 VITALS
TEMPERATURE: 97.7 F | WEIGHT: 315 LBS | SYSTOLIC BLOOD PRESSURE: 128 MMHG | HEART RATE: 50 BPM | HEIGHT: 71 IN | OXYGEN SATURATION: 95 % | DIASTOLIC BLOOD PRESSURE: 70 MMHG | BODY MASS INDEX: 44.1 KG/M2

## 2023-01-30 DIAGNOSIS — E21.3 HYPERPARATHYROIDISM (HCC): Primary | ICD-10-CM

## 2023-01-30 DIAGNOSIS — E21.3 HYPERPARATHYROIDISM (HCC): ICD-10-CM

## 2023-01-30 DIAGNOSIS — S44.12XA INJURY OF MEDIAN NERVE AT LEFT UPPER ARM LEVEL, INITIAL ENCOUNTER: ICD-10-CM

## 2023-01-30 LAB
ANION GAP SERPL CALC-SCNC: 11 MEQ/L (ref 8–16)
BUN SERPL-MCNC: 30 MG/DL (ref 7–22)
CALCIUM SERPL-MCNC: 10.9 MG/DL (ref 8.5–10.5)
CHLORIDE SERPL-SCNC: 102 MEQ/L (ref 98–111)
CO2 SERPL-SCNC: 27 MEQ/L (ref 23–33)
CREAT SERPL-MCNC: 1.5 MG/DL (ref 0.4–1.2)
GFR SERPL CREATININE-BSD FRML MDRD: 51 ML/MIN/1.73M2
GLUCOSE SERPL-MCNC: 154 MG/DL (ref 70–108)
POTASSIUM SERPL-SCNC: 4.4 MEQ/L (ref 3.5–5.2)
PTH-INTACT SERPL-MCNC: 90.7 PG/ML (ref 15–65)
SODIUM SERPL-SCNC: 140 MEQ/L (ref 135–145)

## 2023-01-30 PROCEDURE — G8417 CALC BMI ABV UP PARAM F/U: HCPCS | Performed by: OTOLARYNGOLOGY

## 2023-01-30 PROCEDURE — 3017F COLORECTAL CA SCREEN DOC REV: CPT | Performed by: OTOLARYNGOLOGY

## 2023-01-30 PROCEDURE — 99213 OFFICE O/P EST LOW 20 MIN: CPT | Performed by: OTOLARYNGOLOGY

## 2023-01-30 PROCEDURE — 1123F ACP DISCUSS/DSCN MKR DOCD: CPT | Performed by: OTOLARYNGOLOGY

## 2023-01-30 PROCEDURE — G8427 DOCREV CUR MEDS BY ELIG CLIN: HCPCS | Performed by: OTOLARYNGOLOGY

## 2023-01-30 PROCEDURE — 3074F SYST BP LT 130 MM HG: CPT | Performed by: OTOLARYNGOLOGY

## 2023-01-30 PROCEDURE — G8484 FLU IMMUNIZE NO ADMIN: HCPCS | Performed by: OTOLARYNGOLOGY

## 2023-01-30 PROCEDURE — 1036F TOBACCO NON-USER: CPT | Performed by: OTOLARYNGOLOGY

## 2023-01-30 PROCEDURE — 3078F DIAST BP <80 MM HG: CPT | Performed by: OTOLARYNGOLOGY

## 2023-01-30 RX ORDER — CINACALCET 30 MG/1
30 TABLET, FILM COATED ORAL 2 TIMES DAILY
COMMUNITY

## 2023-01-30 NOTE — PROGRESS NOTES
[address] EAR, NOSE AND THROAT  [facility] [place]  Dept: [phone]  Dept Fax: [phone]  Loc: [phone]    Kira Lacey is a 78 y.o. male who was referred by No ref. provider found for:  Chief Complaint   Patient presents with    Follow-up     Patient is here for a follow up appointment s/p parathyroidectomy 07/28/22  Patient stated that left hand numbness in the first 4 finger. Ended up with carpel tunnel surgery with no relief        HPI:     Kira Lacey is a 78 y.o. male with a history of hyperparathyroidism status post a parathyroid adenoma resection in July of last year complicated by left arm numbness thought to be a manifestation of his arms elbow compression for which he was evaluated at the [facility] of orthopedics. The patient comes in today for follow-up evaluation complaining of continued numbness of his arm status post a median nerve decompression procedure which she believes improved his hand numbness but he describes it as still feeling very abnormal.  In addition he states that he actually had arm and shoulder numbness despite that he was complaining the most about his hand. I reviewed with the patient his most recent lab values and commented on the fact that his PTH is now well within normal limits but his calcium is hovering in the low end of being abnormally high 0.1 units. History:      Allergies   Allergen Reactions    Bactrim [Sulfamethoxazole-Trimethoprim]      Monitor closely due to kidney disease    Celebrex [Celecoxib]      Avoid due to kidney disease    Diclofenac      Avoid due to kidney disease    Dolobid [Diflunisal]      Avoid due to kidney disease    Etodolac      Avoid due to kidney disease    Fenoprofen      Avoid due to kidney disease    Gentamycin [Gentamicin]      Monitor closely due to kidney disease    Ibuprofen      Avoid due to kidney disease    Indocin [Indomethacin] Avoid due to kidney disease    Iv Dye [Iodides]      Monitor closely due to kidney disease    Ketoprofen      Avoid due to kidney disease    Ketorolac      Avoid due to kidney disease    Meclofenamate      Avoid due to kidney disease    Mefenamic Acid      Avoid due to kidney disease    Meloxicam      Avoid due to kidney disease    Nabumetone      Avoid due to kidney disease    Naproxen      Avoid due to kidney disease    Nsaids Other (See Comments)     Avoid due to kidney disease    Oxaprozin      Avoid due to kidney disease    Piroxicam      Avoid due to kidney disease    Rofecoxib      Avoid due to kidney disease    Salsalate      Avoid due to kidney disease    Sulindac      Avoid due to kidney disease    Tetracyclines & Related      Monitor closely due to kidney disease    Tobramycin      Monitor closely due to kidney disease    Tolmetin      Avoid due to kidney disease    Valdecoxib      Avoid due to kidney disease    Vancomycin      Monitor closely due to kidney disease     Current Outpatient Medications   Medication Sig Dispense Refill    cinacalcet (SENSIPAR) 30 MG tablet Take 30 mg by mouth in the morning and at bedtime      UNABLE TO FIND Balance of nature      metFORMIN (GLUCOPHAGE) 500 MG tablet Take 500 mg by mouth 2 times daily (with meals)      allopurinol (ZYLOPRIM) 100 MG tablet Take 200 mg by mouth daily      Finerenone (KERENDIA) 10 MG TABS Take by mouth      Cholecalciferol (VITAMIN D3) 1.25 MG (68709 UT) TABS Take by mouth Every Sunday      zinc 50 MG CAPS Take by mouth      glipiZIDE (GLUCOTROL) 5 MG tablet Take 15 mg by mouth every morning       aspirin 81 MG chewable tablet Take 81 mg by mouth daily      pravastatin (PRAVACHOL) 10 MG tablet Take 20 mg by mouth nightly       bumetanide (BUMEX) 2 MG tablet Take 2 mg by mouth 2 times daily. losartan (COZAAR) 100 MG tablet Take 100 mg by mouth daily.         potassium chloride SA (K-DUR;KLOR-CON) 20 MEQ tablet 1 tabs po twice daily No current facility-administered medications for this visit. Past Medical History:   Diagnosis Date    Arthritis     CAD (coronary artery disease)     CHF (congestive heart failure) (HCC)     Chronic kidney disease     Diabetes mellitus [address]     Hyperlipidemia     Hypertension     Lower extremity edema     Membranous nephropathy determined by biopsy     Movement disorder     Obesity     Obstructive sleep apnea     on CPAP    Pneumonia     SOB (shortness of breath)       Past Surgical History:   Procedure Laterality Date    CARPAL TUNNEL RELEASE Left 2022    CHOLECYSTECTOMY  1980'S    COLONOSCOPY  2016    Dr. Ara Whitaker    COLONOSCOPY  09/08/2020    COLONOSCOPY POLYPECTOMY SNARE/COLD BIOPSY performed by Babatunde Bennett MD at [facility] Left 12/2016    Dr. Ezekiel Leon office    JOINT REPLACEMENT Bilateral 06/03/2014    [facility]      PARATHYROID GLAND SURGERY Right 07/28/2022    Right Parathyroidectomy with Gland Exploration performed by Tom Cavazos MD at [address]  10/04/2016    With Mesh - Dr. Carla England     Family History   Problem Relation Age of Onset    High Blood Pressure Mother     Cancer Mother     Diabetes Mother     Other Father         blood clot    Diabetes Father     Arthritis Father     Stroke Father     Heart Disease Father     High Blood Pressure Sister     Diabetes Sister     Arthritis Sister      Social History     Tobacco Use    Smoking status: Never    Smokeless tobacco: Never   Substance Use Topics    Alcohol use: Yes     Alcohol/week: 3.0 standard drinks     Types: 3 Cans of beer per week     Comment: RARE        Subjective:      Review of Systems  Rest of review of systems are negative, except as noted in HPI.      Objective:     /70 (Site: Left Upper Arm, Position: Sitting)   Pulse 50   Temp 97.7 °F (36.5 °C) (Infrared)   Ht 5' 11\" (1.803 m)   Wt (!) 354 lb (160.6 kg)   SpO2 95%   BMI 49.37 kg/m²     Physical Exam     Vitals reviewed. No results found. Lab Results   Component Value Date/Time     01/30/2023 10:36 AM     12/15/2022 07:32 AM     11/05/2022 08:16 AM    K 4.4 01/30/2023 10:36 AM    K 4.0 12/15/2022 07:32 AM    K 4.1 11/05/2022 08:16 AM     01/30/2023 10:36 AM     12/15/2022 07:32 AM    CL 99 11/05/2022 08:16 AM    CO2 27 01/30/2023 10:36 AM    CO2 23 12/15/2022 07:32 AM    CO2 22 11/05/2022 08:16 AM    BUN 30 01/30/2023 10:36 AM    BUN 28 12/15/2022 07:32 AM    BUN 21 11/05/2022 08:16 AM    CREATININE 1.5 01/30/2023 10:36 AM    CREATININE 1.4 12/15/2022 07:32 AM    CREATININE 1.4 11/05/2022 08:16 AM    CALCIUM 10.9 01/30/2023 10:36 AM    CALCIUM 10.7 12/15/2022 07:32 AM    CALCIUM 9.7 11/05/2022 08:16 AM    PROT 7.3 12/15/2022 07:32 AM    PROT 6.9 09/16/2022 07:18 AM    PROT 6.3 06/15/2022 07:17 AM    LABALBU 4.3 12/15/2022 07:32 AM    LABALBU 4.4 09/16/2022 07:18 AM    LABALBU 4.3 06/15/2022 07:17 AM    LABALBU 4.1 07/21/2011 07:24 AM    BILITOT 0.6 12/15/2022 07:32 AM    BILITOT 0.6 09/16/2022 07:18 AM    BILITOT 0.6 06/15/2022 07:17 AM    ALKPHOS 97 12/15/2022 07:32 AM    ALKPHOS 101 09/16/2022 07:18 AM    ALKPHOS 107 06/15/2022 07:17 AM    AST 63 12/15/2022 07:32 AM    AST 51 09/16/2022 07:18 AM    AST 63 06/15/2022 07:17 AM    ALT 66 12/15/2022 07:32 AM    ALT 51 09/16/2022 07:18 AM    ALT 59 06/15/2022 07:17 AM       All of the past medical history, past surgical history, family history,social history, allergies and current medications were reviewed with the patient. Assessment & Plan   Diagnoses and all orders for this visit:     Diagnosis Orders   1. Hyperparathyroidism ([address]  PTH, Intact    Basic Metabolic Panel      2.  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 This may be a manifestation of his tendency towards renal losses and overcompensation of physiologic activity of his PTH driving his calcium a little high. Nonetheless, he is certainly not a candidate for any further parathyroid explorations if and until he has problems with elevated PTH along with his borderline hypercalcemia. Regarding his cervical spine disease, I recommended that he see Dr. Sandra Molina at [facility] for an evaluation to see if he should be considered for a decompression procedure of his cervical spine to relieve his left upper extremity numbness. He agreed to pursue evaluation but already has some connection to one of the orthopedic groups in [place] that he wishes to get in touch with. He will let me know if he wishes a consult request placed for one of the other group in the next few days. He will see me back on an as-needed basis in the future for his PTH problems should they reemerge. He reported understanding the basis of my recommendations and being willing to proceed as recommended. I will get a fresh set of labs for his PTH and calcium to keep track of where he is now compared to where he has been. Of note is the fact that his calcium today was 10.9 which is 0.4 above normal limit but his creatinine is 1.5 and his BUN is 30 indicating dehydration and/or chronic renal insufficiency. His ionized calcium and his PTH are pending. I spent over 20 minutes of face-to-face time with the patient the majority of which was dedicated to reviewing his complex history and structuring his follow-up care. Return if symptoms worsen or fail to improve or new ENT problems. **This report has been created using voice recognition software. It may contain minor errors which are inherent in voice recognition technology. **

## 2023-02-03 LAB — CA-I SERPL ISE-SCNC: NORMAL MMOL/L

## 2023-02-21 ENCOUNTER — HOSPITAL ENCOUNTER (OUTPATIENT)
Dept: PHYSICAL THERAPY | Age: 68
Setting detail: THERAPIES SERIES
Discharge: HOME | End: 2023-02-21
Payer: MEDICARE

## 2023-02-21 PROCEDURE — 97163 PT EVAL HIGH COMPLEX 45 MIN: CPT

## 2023-02-21 PROCEDURE — 97110 THERAPEUTIC EXERCISES: CPT

## 2023-02-21 NOTE — PROGRESS NOTES
** PLEASE SIGN, DATE AND TIME CERTIFICATION BELOW AND RETURN TO [facility] OUTPATIENT REHABILITATION (FAX #: [phone]).  ATTEST/CO-SIGN IF ACCESSING VIA INEquigerminal.  THANK YOU.**    I certify that I have examined the patient below and determined that Physical Medicine and Rehabilitation service is necessary and that I approve the established plan of care for up to 90 days or as specifically noted.  Attestation, signature or co-signature of physician indicates approval of certification requirements.    ________________________ ____________ __________  Physician Signature   Date   Time   [facility]  PHYSICAL THERAPY  [x] EVALUATION  [] DAILY NOTE (LAND) [] DAILY NOTE (AQUATIC ) [] PROGRESS NOTE [] DISCHARGE NOTE    [] OUTPATIENT REHABILITATION C[facility]   [x] [place] [facility] [place]    [] [facility]   [] [facility]    Date: 2023  Patient Name:  Clem Arizmendi  : 1955  MRN: [id number]  CSN: [id number]    Referring Practitioner Wood Mata MD   Diagnosis Other spondylosis, cervical region [M47.892]  Radiculopathy, cervical region [M54.12]    Treatment Diagnosis L UE weakness, L UE pain, neck pain   Date of Evaluation 23   Additional Pertinent History HTN, DM with only occasional checking sugars- 150-200, Obesity, L shoulder weakness x 20 years ago when  heavy object and \"I think I tore my rotator cuff\"      Functional Outcome Measure Used Neck disability scale   Functional Outcome Score Eval score 16  (23)       Insurance: Primary: Payor: MEDICARE /  /  / ,   Secondary: [employer] [facility]   Authorization Information: INSURANCE PAYS AT:   80%              PATIENT RESPONSIBILITY AND/OR CO-PAY:  20%  SECONDARY INSURANCE COMPANY: .  [employer] [facility]     PRECERTIFICATION REQUIRED:  No  INSURANCE THERAPY BENEFIT:  Patient has unlimited visits based on medical necessity  Benefit will not cover maintenance or preventative treatment.  AQUATIC  THERAPY COVERED:   Yes  MODALITIES COVERED:  Yes, with the exception of iontophoresis and hot packs/cold packs   Visit # 1, 1/10 for progress note   Visits Allowed: unlimited   Recertification Date: 5/21/23   Physician Follow-Up: Unsure of f/u date, awaiting call for MRI   Physician Orders:    History of Present Illness: 7/28/22 had L UE parathyroid and tumor, woke up with L UE numbness from neck to hand.  Had EMG by Dr. Capone on 10/20/22 and dx with L carpal tunnel and L C5-6 cervical radiculopathy .  Patient had L Carpal tunnel surgery by Dr. Mendoza on 11/15/22 but did not have any relief in numbness of L hand.  Patient to neck specialist in [place] at [facility] on 2/14/23, xrays done, PT and MRI ordered and then to call to schedule f/u with MD.       SUBJECTIVE: difficulty with feeling pills for setting up moms and own pill box , increased difficulty with writing is left handed.  Difficulty with carrying cup of coffee, increased difficulty holding trigger with chainsaw to split wood- has wood that he needs split currently  Difficulty writing  Social/Functional History and Current Status:  Medications and Allergies have been reviewed and are listed on Medical History Questionnaire.    Clem Arizmendi lives with spouse in a single story home with stairs and a handrail to enter.    Task Previous Current   ADL’s  Independent Modified Independent difficulty with buttons, zipper, tying shoes   IADL's Independent Modified Independent difficulty splitting wood, difficulty with using hand to shovel, work in garden, difficulty using nevaeh to cut branches   Ambulation Independent Independent   Transfers Independent Independent   Recreation Independent Independent   Community Integration Independent Independent   Driving Active  Active    Work Retired  Retired       OBJECTIVE:  Pain L UE wrist to hand digit 1-4, (no s/s in 5th finger) numbness and stinging 100% of the day, high 7/10, 4/10.  Does increased  intensity when squeezing, gripping,  splitting wood . Palpation Mild tightness noted L>R upper trap     Observation Moderate adhesions noted L carpal tunnel incision with limited mobility   Posture good        Range of Motion Neck: rotation R 72, L 65 degrees, lateral flexion B 35 degrees, flexion 40, extension 40 degrees   Upper Extremity: R shoulder flexion and abduction 170 degrees, ER 60, IR 60 degrees. L shoulder flexion and abduction 160 degrees, ER and IR 50 degrees - all with no change in L hand s/s 4/10   Strength Right Upper Extremity:   WFL,  shoulder flexion and abduction 4+/5, IR/ER 4+/5, wrist and elbow 4+/5,  strength R 50#  Left Upper Extremity:  Impaired - L shoulder all 4-/5 with injury in L shoulder 20 years ago, elbow 4/5,  strength 40#, L hand dominant    Poor cervical isometric strength noted  Pinch strength; thumb to   Index finger : R 13#, L 9#  Middle finger: R 10#, L 6#  Ring finger: R 7#, L 3#  5th finger: R 5#, L 3#   Coordination Impaired - moderate decrease in L hand coordination noted fingers to thumb   Sensation Impaired - L hand wrist to finger 1-4,    Bed Mobility WFL   Transfers [facility]   Ambulation Independent  Distance: 100 feet  Surface: Level Tile  Device:No Device  Gait Deviations:   Forward Flexed Posture and Wide Base of Support   Balance Not Tested   Special Tests Negative vertebral artery testing B, negative Spurlings test B           TREATMENT   Precautions: None currently, L carpal surgery on 11/15/22 with no current restrictions   Pain: Denies pain, numbness/stinging L hand digit 1-4 , 4/10    \"X in shaded column indicates activity completed today    *\" next to exercise/intervention indicates progression   Modalities Parameters/  Location  Notes                     Manual Therapy Time/Technique  Notes                     Exercise/Intervention   Notes   Cervical retraction sitting Minimal pressure by PT 3 x 10 no change  x    Postural education for cervical neutral positioning   x                                                                     Specific Interventions Next Treatment: lower cervical mechanical progression in attempts to centralize and lessen L UE hand s/s as well as manual cervical traction, mechanical traction, family does have some sort of unit but patient unsure what it is, will bring in and PT determine if this unit is the correct unit or if needs to get another unity, patient does utilize . [facility]'s home medical is DME is needed. Modalities as needed with IFC or US, may have to utilize mechanical cervical traction in [address] [place] [address] if needed    Activity/Treatment Tolerance:  [x]  Patient tolerated treatment well  []  Patient limited by fatigue  []  Patient limited by pain   []  Patient limited by medical complications  []  Other:     Assessment: PT for exercises to improve AROM, strength,modalities to lessen and centralize L UE s/s with cervical radiculopathy and L UE s/s  Body Structures/Functions/Activity Limitations: impaired ROM, impaired strength, and pain  Prognosis: fair    GOALS:  Patient Goal: to get this hand s/s to go away    Short Term Goals:  Time Frame: 4 weeks.   Increase AROM cervical rotation B to 80 degrees to allow patient to report decreased L hand s/s to 4/10 high with dressing and doing buttons with less difficulty  I with HEP as prescribed to allow patient to demonstrate improved  strength L hand to 50# with patient able to  hold pen for suduko with less difficulty    Long Term Goals:  Time Frame: 6 weeks  Increase AROM cervical lateral flexion to 45 , L shoulder flexion and abduction to 170 degrees to allow patient to report decreased s/s L hand to high 1/10 with no difficulty holding coffee cup  Increase strength cervical isometric to fair, L hand pinch strength to within 2 # of right hand pinch to improve ease doing zippers  I with HEP as prescribed to allow patient progress to splitting wood with no difficulty    Patient Education:   [x]  HEP/Education Completed: Plan of Care, Goals, HEP of above exercises with handout given and handout of schedule  [employer] Access Code:  []  No new Education completed  []  Reviewed Prior HEP      []  Patient verbalized and/or demonstrated understanding of education provided. []  Patient unable to verbalize and/or demonstrate understanding of education provided. Will continue education. [x]  Barriers to learning: none    PLAN:  Treatment Recommendations: Strengthening, Range of Motion, Manual Therapy - Soft Tissue Mobilization, Pain Management, Home Exercise Program, Patient Education, and Modalities    [x]  Plan of care initiated. Plan to see patient 2 times per week for 6 weeks to address the treatment planned outlined above.   []  Continue with current plan of care  []  Modify plan of care as follows:    []  Hold pending physician visit  []  Discharge    Time In 810   Time Out 900   Timed Code Minutes: 15 min   Total Treatment Time: 50 min       Electronically Signed by: Miguel Elizabeth PT

## 2023-02-23 ENCOUNTER — HOSPITAL ENCOUNTER (OUTPATIENT)
Dept: MRI IMAGING | Age: 68
Discharge: HOME | End: 2023-02-23
Payer: MEDICARE

## 2023-02-23 DIAGNOSIS — M47.892 OTHER SPONDYLOSIS, CERVICAL REGION: ICD-10-CM

## 2023-02-23 DIAGNOSIS — M54.12 BRACHIAL NEURITIS: ICD-10-CM

## 2023-02-23 DIAGNOSIS — M50.321 DEGENERATION OF INTERVERTEBRAL DISC AT C4-C5 LEVEL: ICD-10-CM

## 2023-02-23 PROCEDURE — 72141 MRI NECK SPINE W/O DYE: CPT

## 2023-02-24 ENCOUNTER — HOSPITAL ENCOUNTER (OUTPATIENT)
Dept: PHYSICAL THERAPY | Age: 68
Setting detail: THERAPIES SERIES
Discharge: HOME | End: 2023-02-24
Payer: MEDICARE

## 2023-02-24 PROCEDURE — 97035 APP MDLTY 1+ULTRASOUND EA 15: CPT

## 2023-02-24 PROCEDURE — 97110 THERAPEUTIC EXERCISES: CPT

## 2023-02-24 NOTE — PROGRESS NOTES
[address] [facility]  PHYSICAL THERAPY  [] EVALUATION  [x] DAILY NOTE (LAND) [] DAILY NOTE (AQUATIC ) [] PROGRESS NOTE [] DISCHARGE NOTE    [] [facility]   [x] [place] [address]    [] [facility]   [] Alpesh Rosenberg    Date: 2023  Patient Name:  Yesenia Gilman  : 1955  MRN: [id number]  CSN: [id number]    Referring Practitioner Nicole Sidhu MD   Diagnosis Other spondylosis, cervical region [M47.892]  Radiculopathy, cervical region [M54.12]    Treatment Diagnosis L UE weakness, L UE pain, neck pain   Date of Evaluation 23   Additional Pertinent History HTN, DM with only occasional checking sugars- 150-200, Obesity, L shoulder weakness x 20 years ago when  heavy object and \"I think I tore my rotator cuff\"      Functional Outcome Measure Used Neck disability scale   Functional Outcome Score Eval score 16  (23)       Insurance: Primary: Payor: MEDICARE /  /  / ,   Secondary: [employer] [facility]   Authorization Information: INSURANCE PAYS AT:   80%              PATIENT RESPONSIBILITY AND/OR CO-PAY:  20%  SECONDARY INSURANCE COMPANY: .  [employer]  [facility] [place]     PRECERTIFICATION REQUIRED:  No  INSURANCE THERAPY BENEFIT:  Patient has unlimited visits based on medical necessity  Benefit will not cover maintenance or preventative treatment. AQUATIC THERAPY COVERED:   Yes  MODALITIES COVERED:  Yes, with the exception of iontophoresis and hot packs/cold packs   Visit # 2, 2/10 for progress note   Visits Allowed: unlimited   Recertification Date:    Physician Follow-Up: Rosalyn Essex of f/u date, awaiting call for MRI   Physician Orders:    History of Present Illness: 23 had L UE parathyroid and tumor, woke up with L UE numbness from neck to hand. Had EMG by Dr. Kourtney Hinds on 10/20/22 and dx with L carpal tunnel and L C5-6 cervical radiculopathy .   Patient had L Carpal tunnel surgery by Dr. Rehana Shaw on 11/15/22 but did not have any relief in numbness of L hand. Patient to neck specialist in [place] at [facility] on 2/14/23, xrays done, PT and MRI ordered and then to call to schedule f/u with MD.    Patient does have home cervical traction unit he can borrow- brought 2/24/23 and advised to use in supine, 10 minutes 2 x per day. SUBJECTIVE: reports neck pain today 1/10  L hand numbness/stinging 100% of the day, high decreased 7 to 4/10 in past 2 days, did bring in home cervical traction unit.         OBJECTIVE:    TREATMENT   Precautions: None currently, L carpal surgery on 11/15/22 with no current restrictions   Pain: Denies pain, numbness/stinging L hand digit 1-4 , 3/10, neck pain 1/10    \"X in shaded column indicates activity completed today    *\" next to exercise/intervention indicates progression   Modalities Parameters/  Location  Notes   % pulsed, 8 minutes cervical spine 8 minutes, 1.0 intensity x                Manual Therapy Time/Technique  Notes                     Exercise/Intervention   Notes   Cervical retraction sitting Minimal pressure by PT 3 x 10 no change  x    Postural                                                                                                                                                                                                                                                                                                                                                                                                                                                                                                                                                                                                                                                                                                         education for cervical neutral positioning   x    Sitting cervical retraction and extension 25%, 50% 2 x 30 seconds no change  x    Supine 2 yellow pillows 3 minutes  x Cervical retraction by PT 3x 10 no change  x    Manual cervical traction  5 x 30 seconds  x No change                                        Specific Interventions Next Treatment: lower cervical mechanical progression in attempts to centralize and lessen L UE hand s/s as well as manual cervical traction, mechanical traction, family does have some sort of unit but patient unsure what it is, will bring in and PT determine if this unit is the correct unit or if needs to get another unity, patient does utilize . [facility] is DME is needed. Modalities as needed with IFC or US, may have to utilize mechanical cervical traction in [facility] if needed    Activity/Treatment Tolerance:  [x]  Patient tolerated treatment well  []  Patient limited by fatigue  []  Patient limited by pain   []  Patient limited by medical complications  []  Other:     Assessment: PT did US and mechanical cervical progression. Did advise to continue to do cervical retractions  3 x per day for HEP. Unable to change L hand s/s with mechanical treatment but did advise to use traction unit supine 10 minutes, 2 x per day    GOALS:  Patient Goal: to get this hand s/s to go away    Short Term Goals:  Time Frame: 4 weeks.   Increase AROM cervical rotation B to 80 degrees to allow patient to report decreased L hand s/s to 4/10 high with dressing and doing buttons with less difficulty  I with HEP as prescribed to allow patient to demonstrate improved  strength L hand to 50# with patient able to  hold pen for suduko with less difficulty    Long Term Goals:  Time Frame: 6 weeks  Increase AROM cervical lateral flexion to 45 , L shoulder flexion and abduction to 170 degrees to allow patient to report decreased s/s L hand to high 1/10 with no difficulty holding coffee cup  Increase strength cervical isometric to fair, L hand pinch strength to within 2 # of right hand pinch to improve ease doing zippers  I with HEP as prescribed to allow patient progress to splitting wood with no difficulty    Patient Education:   [x]  HEP/Education Completed: Plan of Care, Goals, HEP of above exercises with handout given and handout of schedule  [employer] Access Code:  []  No new Education completed  []  Reviewed Prior HEP      []  Patient verbalized and/or demonstrated understanding of education provided. []  Patient unable to verbalize and/or demonstrate understanding of education provided. Will continue education. [x]  Barriers to learning: none    PLAN:  Treatment Recommendations: Strengthening, Range of Motion, Manual Therapy - Soft Tissue Mobilization, Pain Management, Home Exercise Program, Patient Education, and Modalities    []  Plan of care initiated. Plan to see patient 2 times per week for 6 weeks to address the treatment planned outlined above.   [x]  Continue with current plan of care  []  Modify plan of care as follows:    []  Hold pending physician visit  []  Discharge    Time In 800   Time Out 830   Timed Code Minutes: 30 min   Total Treatment Time: 30 min       Electronically Signed by: Leatha Robles PT

## 2023-02-27 ENCOUNTER — HOSPITAL ENCOUNTER (OUTPATIENT)
Dept: PHYSICAL THERAPY | Age: 68
Setting detail: THERAPIES SERIES
Discharge: HOME | End: 2023-02-27
Payer: MEDICARE

## 2023-02-27 PROCEDURE — 97110 THERAPEUTIC EXERCISES: CPT

## 2023-02-27 PROCEDURE — 97035 APP MDLTY 1+ULTRASOUND EA 15: CPT

## 2023-02-27 NOTE — PROGRESS NOTES
[address] [facility]  PHYSICAL THERAPY  [] EVALUATION  [x] DAILY NOTE (LAND) [] DAILY NOTE (AQUATIC ) [] PROGRESS NOTE [] DISCHARGE NOTE    [] [facility]   [x] [address]    [] [facility]   [] Sissy Earnest    Date: 2023  Patient Name:  Baron Aguayo  : 1955  MRN: [id number]  CSN: [id number]    Referring Practitioner Marge Ennis MD   Diagnosis Other spondylosis, cervical region [M47.892]  Radiculopathy, cervical region [M54.12]    Treatment Diagnosis L UE weakness, L UE pain, neck pain   Date of Evaluation 23   Additional Pertinent History HTN, DM with only occasional checking sugars- 150-200, Obesity, L shoulder weakness x 20 years ago when  heavy object and \"I think I tore my rotator cuff\"      Functional Outcome Measure Used Neck disability scale   Functional Outcome Score Eval score 16  (23)       Insurance: Primary: Payor: MEDICARE /  /  / ,   Secondary: [facility]   Authorization Information: INSURANCE PAYS AT:   80%              PATIENT RESPONSIBILITY AND/OR CO-PAY:  20%  SECONDARY INSURANCE COMPANY: .  [facility]     PRECERTIFICATION REQUIRED:  No  INSURANCE THERAPY BENEFIT:  Patient has unlimited visits based on medical necessity  Benefit will not cover maintenance or preventative treatment. AQUATIC THERAPY COVERED:   Yes  MODALITIES COVERED:  Yes, with the exception of iontophoresis and hot packs/cold packs   Visit # 3, 3/10 for progress note   Visits Allowed: unlimited   Recertification Date: 08   Physician Follow-Up: Sharif Welch of f/u date, awaiting call for MRI   Physician Orders:    History of Present Illness: 23 had L UE parathyroid and tumor, woke up with L UE numbness from neck to hand. Had EMG by Dr. Rocky Lester on 10/20/22 and dx with L carpal tunnel and L C5-6 cervical radiculopathy .   Patient had L Carpal tunnel surgery by Dr. Scar Cotton on 11/15/22 but did not have any relief in numbness of L hand. Patient to neck specialist in [place] at [facility] on 2/14/23, xrays done, PT and MRI ordered and then to call to schedule f/u with MD.    Patient does have home cervical traction unit he can borrow- brought 2/24/23 and advised to use in supine, 10 minutes 2 x per day. SUBJECTIVE: reports neck pain today 1/10  L hand numbness/stinging 100% of the day, high decreased 4 to 3/10 in past 2 days, did bring in home cervical traction unit.         OBJECTIVE:    TREATMENT   Precautions: None currently, L carpal surgery on 11/15/22 with no current restrictions   Pain: Denies pain, numbness/stinging L hand digit 1-4 , 3/10, neck pain 1/10    \"X in shaded column indicates activity completed today    *\" next to exercise/intervention indicates progression   Modalities Parameters/  Location  Notes   % pulsed, 8 minutes cervical spine 8 minutes, 1.0 intensity x                Manual Therapy Time/Technique  Notes                     Exercise/Intervention   Notes   Cervical retraction sitting Minimal pressure by PT 3 x 10 no change  x    Postural                                                                                                                                                                                                                                                                                                                                                                                                                                                                                                                                                                                                                                                                                                         education for cervical neutral positioning   x    Sitting cervical retraction and extension 25%, 50% 2 x 30 seconds no change  x    Supine 2 yellow pillows 3 minutes  x Cervical retraction by PT 3x 10 no change  x    Manual cervical traction  5 x 30 seconds  x No change                                        Specific Interventions Next Treatment: lower cervical mechanical progression in attempts to centralize and lessen L UE hand s/s as well as manual cervical traction, mechanical traction, family does have some sort of unit but patient unsure what it is, will bring in and PT determine if this unit is the correct unit or if needs to get another unity, patient does utilize [facility]'s home medical is DME is needed. Modalities as needed with IF or US, may have to utilize mechanical cervical traction in [facility] if needed    Activity/Treatment Tolerance:  [x]  Patient tolerated treatment well  []  Patient limited by fatigue  []  Patient limited by pain   []  Patient limited by medical complications  []  Other:     Assessment: do note decreased overall intensity of L hand s/s to 3/10 but no immediate lessening with exercises    GOALS:  Patient Goal: to get this hand s/s to go away    Short Term Goals:  Time Frame: 4 weeks.   Increase AROM cervical rotation B to 80 degrees to allow patient to report decreased L hand s/s to 4/10 high with dressing and doing buttons with less difficulty  I with HEP as prescribed to allow patient to demonstrate improved  strength L hand to 50# with patient able to  hold pen for suduko with less difficulty    Long Term Goals:  Time Frame: 6 weeks  Increase AROM cervical lateral flexion to 45 , L shoulder flexion and abduction to 170 degrees to allow patient to report decreased s/s L hand to high 1/10 with no difficulty holding coffee cup  Increase strength cervical isometric to fair, L hand pinch strength to within 2 # of right hand pinch to improve ease doing zippers  I with HEP as prescribed to allow patient progress to splitting wood with no difficulty    Patient Education:   [x]  HEP/Education Completed: continue every 2 hours 3 x 10 cervical retractions  []  No new Education completed  []  Reviewed Prior HEP      []  Patient verbalized and/or demonstrated understanding of education provided. []  Patient unable to verbalize and/or demonstrate understanding of education provided. Will continue education. [x]  Barriers to learning: none    PLAN:  Treatment Recommendations: Strengthening, Range of Motion, Manual Therapy - Soft Tissue Mobilization, Pain Management, Home Exercise Program, Patient Education, and Modalities    []  Plan of care initiated. Plan to see patient 2 times per week for 6 weeks to address the treatment planned outlined above.   [x]  Continue with current plan of care  []  Modify plan of care as follows:    []  Hold pending physician visit  []  Discharge    Time In 800   Time Out 830   Timed Code Minutes: 30 min   Total Treatment Time: 30 min       Electronically Signed by: Yolanda Chinchilla PT

## 2023-03-03 ENCOUNTER — HOSPITAL ENCOUNTER (OUTPATIENT)
Dept: PHYSICAL THERAPY | Age: 68
Setting detail: THERAPIES SERIES
Discharge: HOME | End: 2023-03-03
Payer: MEDICARE

## 2023-03-03 PROCEDURE — 97035 APP MDLTY 1+ULTRASOUND EA 15: CPT

## 2023-03-03 PROCEDURE — 97110 THERAPEUTIC EXERCISES: CPT

## 2023-03-03 NOTE — PROGRESS NOTES
[address] [facility]  PHYSICAL THERAPY  [] EVALUATION  [x] DAILY NOTE (LAND) [] DAILY NOTE (AQUATIC ) [] PROGRESS NOTE [] DISCHARGE NOTE    [] [facility]   [x] [facility]    [] [facility]   [] Iban Lima    Date: 3/3/2023  Patient Name:  Mariano Bland  : 1955  MRN: [id number]  CSN: [id number]    Referring Practitioner Vadim Mackenzie MD   Diagnosis Other spondylosis, cervical region [M47.892]  Radiculopathy, cervical region [M54.12]    Treatment Diagnosis L UE weakness, L UE pain, neck pain   Date of Evaluation 23   Additional Pertinent History HTN, DM with only occasional checking sugars- 150-200, Obesity, L shoulder weakness x 20 years ago when  heavy object and \"I think I tore my rotator cuff\"      Functional Outcome Measure Used Neck disability scale   Functional Outcome Score Eval score 16  (23)       Insurance: Primary: Payor: MEDICARE /  /  / ,   Secondary: [employer] [facility]   Authorization Information: INSURANCE PAYS AT:   80%              PATIENT RESPONSIBILITY AND/OR CO-PAY:  20%  SECONDARY INSURANCE COMPANY: .  [email or web address] [facility]     PRECERTIFICATION REQUIRED:  No  INSURANCE THERAPY BENEFIT:  Patient has unlimited visits based on medical necessity  Benefit will not cover maintenance or preventative treatment. AQUATIC THERAPY COVERED:   Yes  MODALITIES COVERED:  Yes, with the exception of iontophoresis and hot packs/cold packs   Visit # 4, 4/10 for progress note   Visits Allowed: unlimited   Recertification Date: 66   Physician Follow-Up: 3/23   Physician Orders:    History of Present Illness: 23 had L UE parathyroid and tumor, woke up with L UE numbness from neck to hand. Had EMG by Dr. Jagdish Brar on 10/20/22 and dx with L carpal tunnel and L C5-6 cervical radiculopathy . Patient had L Carpal tunnel surgery by Dr. Shahida Kapoor on 11/15/22 but did not have any relief in numbness of L hand.   Patient to neck specialist in [place] at [facility] on 2/14/23, xrays done, PT and MRI ordered and then to call to schedule f/u with MD.    Patient does have home cervical traction unit he can borrow- brought 2/24/23 and advised to use in supine, 10 minutes 2 x per day. SUBJECTIVE: reports neck pain today 1/10  L hand numbness/stinging 100% of the day, high decreased 4 to 3/10 in past 2 days, s/s have remained 3/10 over past 2-3 days.       OBJECTIVE:    TREATMENT   Precautions: None currently, L carpal surgery on 11/15/22 with no current restrictions   Pain: Denies pain, numbness/stinging L hand digit 1-4 , 3/10, neck pain 1/10    \"X in shaded column indicates activity completed today    *\" next to exercise/intervention indicates progression   Modalities Parameters/  Location  Notes   % pulsed, 8 minutes cervical spine 8 minutes, 1.0 intensity x                Manual Therapy Time/Technique  Notes                     Exercise/Intervention   Notes   Cervical retraction sitting Minimal pressure by PT 3 x 10 no change  x    Postural                                                                                                                                                                                                                                                                                                                                                                                                                                                                                                                                                                                                                                                                                                         education for cervical neutral positioning   x    Sitting cervical retraction and extension 25%, 50% 2 x 30 seconds no change  x    Supine 2 yellow pillows 3 minutes  x    Cervical retraction by PT 3x 10 no change  x Manual cervical traction  5 x 30 seconds  x No change   Sitting scapular retraction 10x  x    Backward shoulder circles 10x  x    Sitting cervical isometrics rotation 5x 5s x                    Specific Interventions Next Treatment: lower cervical mechanical progression in attempts to centralize and lessen L UE hand s/s as well as manual cervical traction, mechanical traction, family does have some sort of unit but patient unsure what it is, will bring in and PT determine if this unit is the correct unit or if needs to get another unity, patient does utilize [facility]'s home medical is DME is needed. Modalities as needed with IFC or US, may have to utilize mechanical cervical traction in [employer] if needed    Activity/Treatment Tolerance:  [x]  Patient tolerated treatment well  []  Patient limited by fatigue  []  Patient limited by pain   []  Patient limited by medical complications  []  Other:     Assessment:  if no change at beginning of next week, may try mechanical traction in [employer] if needed. GOALS:  Patient Goal: to get this hand s/s to go away    Short Term Goals:  Time Frame: 4 weeks.   Increase AROM cervical rotation B to 80 degrees to allow patient to report decreased L hand s/s to 4/10 high with dressing and doing buttons with less difficulty  I with HEP as prescribed to allow patient to demonstrate improved  strength L hand to 50# with patient able to  hold pen for suduko with less difficulty    Long Term Goals:  Time Frame: 6 weeks  Increase AROM cervical lateral flexion to 45 , L shoulder flexion and abduction to 170 degrees to allow patient to report decreased s/s L hand to high 1/10 with no difficulty holding coffee cup  Increase strength cervical isometric to fair, L hand pinch strength to within 2 # of right hand pinch to improve ease doing zippers  I with HEP as prescribed to allow patient progress to splitting wood with no difficulty    Patient Education:   [x]  HEP/Education Completed: handout for cervical isometric, scapular retraction, backward shoulder circles  []  No new Education completed  []  Reviewed Prior HEP      []  Patient verbalized and/or demonstrated understanding of education provided. []  Patient unable to verbalize and/or demonstrate understanding of education provided. Will continue education. [x]  Barriers to learning: none    PLAN:  Treatment Recommendations: Strengthening, Range of Motion, Manual Therapy - Soft Tissue Mobilization, Pain Management, Home Exercise Program, Patient Education, and Modalities    []  Plan of care initiated. Plan to see patient 2 times per week for 6 weeks to address the treatment planned outlined above.   [x]  Continue with current plan of care  []  Modify plan of care as follows:    []  Hold pending physician visit  []  Discharge    Time In 800   Time Out 825   Timed Code Minutes: 25 min   Total Treatment Time: 25 min       Electronically Signed by: Nico Shay PT

## 2023-03-06 ENCOUNTER — HOSPITAL ENCOUNTER (OUTPATIENT)
Dept: PHYSICAL THERAPY | Age: 68
Setting detail: THERAPIES SERIES
Discharge: HOME | End: 2023-03-06
Payer: MEDICARE

## 2023-03-06 PROCEDURE — 97110 THERAPEUTIC EXERCISES: CPT

## 2023-03-06 PROCEDURE — 97035 APP MDLTY 1+ULTRASOUND EA 15: CPT

## 2023-03-06 NOTE — PROGRESS NOTES
[address] [facility]  PHYSICAL THERAPY  [] EVALUATION  [x] DAILY NOTE (LAND) [] DAILY NOTE (AQUATIC ) [] PROGRESS NOTE [] DISCHARGE NOTE    [] [facility]   [x] [facility]    [] [facility]   [] Myles Shearing    Date: 3/6/2023  Patient Name:  Harpreet Cortes  : 1955  MRN: [id number]  CSN: [id number]    Referring Practitioner Rafa Reno MD   Diagnosis Other spondylosis, cervical region [M47.892]  Radiculopathy, cervical region [M54.12]    Treatment Diagnosis L UE weakness, L UE pain, neck pain   Date of Evaluation 23   Additional Pertinent History HTN, DM with only occasional checking sugars- 150-200, Obesity, L shoulder weakness x 20 years ago when  heavy object and \"I think I tore my rotator cuff\"      Functional Outcome Measure Used Neck disability scale   Functional Outcome Score Eval score 16  (23)       Insurance: Primary: Payor: MEDICARE /  /  / ,   Secondary: [employer] [facility]   Authorization Information: INSURANCE PAYS AT:   80%              PATIENT RESPONSIBILITY AND/OR CO-PAY:  20%  SECONDARY INSURANCE COMPANY: .  [employer]  [place]     PRECERTIFICATION REQUIRED:  No  INSURANCE THERAPY BENEFIT:  Patient has unlimited visits based on medical necessity  Benefit will not cover maintenance or preventative treatment. AQUATIC THERAPY COVERED:   Yes  MODALITIES COVERED:  Yes, with the exception of iontophoresis and hot packs/cold packs   Visit # 5, 5/10 for progress note   Visits Allowed: unlimited   Recertification Date:    Physician Follow-Up: 3/23   Physician Orders:    History of Present Illness: 23 had L UE parathyroid and tumor, woke up with L UE numbness from neck to hand. Had EMG by Dr. Iggy Ivey on 10/20/22 and dx with L carpal tunnel and L C5-6 cervical radiculopathy . Patient had L Carpal tunnel surgery by Dr. Mere Forrester on 11/15/22 but did not have any relief in numbness of L hand.   Patient to neck specialist in [place] [facility] on 2/14/23, xrays done, PT and MRI ordered and then to call to schedule f/u with MD.    Patient does have home cervical traction unit he can borrow- brought 2/24/23 and advised to use in supine, 10 minutes 2 x per day. SUBJECTIVE: reports neck pain today 1/10  L hand numbness/stinging 100% of the day, high decreased 3/10 in thumb to 2/10 in past 2 days, s/s have remained in L hand finger 2-4 at 3/10 over past 2-3 days.       OBJECTIVE:    TREATMENT   Precautions: None currently, L carpal surgery on 11/15/22 with no current restrictions   Pain: Denies pain, numbness/stinging L hand digit 1-4 , 3/10, neck pain 1/10    \"X in shaded column indicates activity completed today    *\" next to exercise/intervention indicates progression   Modalities Parameters/  Location  Notes   % pulsed, 8 minutes cervical spine 8 minutes, 1.0 intensity x                Manual Therapy Time/Technique  Notes                     Exercise/Intervention   Notes   Cervical retraction sitting Minimal pressure by PT 3 x 10 no change  x    Postural                                                                                                                                                                                                                                                                                                                                                                                                                                                                                                                                                                                                                                                                                                         education for cervical neutral positioning   x    Sitting cervical retraction and extension 25%, 50% 2 x 30 seconds no change  x    Supine 2 yellow pillows 3 minutes  x    Cervical retraction by PT 3x 10 no change  x    Manual cervical traction  7 x 30 seconds  x No change   Sitting scapular retraction 15x  x    Backward shoulder circles 15x  x    Sitting cervical isometrics rotation 5x 5s x                    Specific Interventions Next Treatment: lower cervical mechanical progression in attempts to centralize and lessen L UE hand s/s as well as manual cervical traction, mechanical traction, family does have some sort of unit but patient unsure what it is, will bring in and PT determine if this unit is the correct unit or if needs to get another unity, patient does utilize [facility] home medical is DME is needed. Modalities as needed with IFC or US, may have to utilize mechanical cervical traction in [facility] if needed    Activity/Treatment Tolerance:  [x]  Patient tolerated treatment well  []  Patient limited by fatigue  []  Patient limited by pain   []  Patient limited by medical complications  []  Other:     Assessment:  patient would like to try mechanical cervical traction and will be seeing Patricia Griffith in [facility] for this in [facility] on 3/10. Would like to continue US in [address] [place] [address] Dr, doing exercises sitting and supine, progressing with neutral isometrics    GOALS:  Patient Goal: to get this hand s/s to go away    Short Term Goals:  Time Frame: 4 weeks.   Increase AROM cervical rotation B to 80 degrees to allow patient to report decreased L hand s/s to 4/10 high with dressing and doing buttons with less difficulty  I with HEP as prescribed to allow patient to demonstrate improved  strength L hand to 50# with patient able to  hold pen for suduko with less difficulty    Long Term Goals:  Time Frame: 6 weeks  Increase AROM cervical lateral flexion to 45 , L shoulder flexion and abduction to 170 degrees to allow patient to report decreased s/s L hand to high 1/10 with no difficulty holding coffee cup  Increase strength cervical isometric to fair, L hand pinch strength to within 2 # of right hand pinch to improve ease doing zippers  I with HEP as prescribed to allow patient progress to splitting wood with no difficulty    Patient Education:   [x]  HEP/Education Completed: handout for cervical isometric, scapular retraction, backward shoulder circles  []  No new Education completed  []  Reviewed Prior HEP      []  Patient verbalized and/or demonstrated understanding of education provided. []  Patient unable to verbalize and/or demonstrate understanding of education provided. Will continue education. [x]  Barriers to learning: none    PLAN:  Treatment Recommendations: Strengthening, Range of Motion, Manual Therapy - Soft Tissue Mobilization, Pain Management, Home Exercise Program, Patient Education, and Modalities    []  Plan of care initiated. Plan to see patient 2 times per week for 6 weeks to address the treatment planned outlined above.   [x]  Continue with current plan of care  []  Modify plan of care as follows:    []  Hold pending physician visit  []  Discharge    Time In 800   Time Out 830   Timed Code Minutes: 30 min   Total Treatment Time: 30 min       Electronically Signed by: Agustín Rowland PT

## 2023-03-10 ENCOUNTER — HOSPITAL ENCOUNTER (OUTPATIENT)
Dept: PHYSICAL THERAPY | Age: 68
Setting detail: THERAPIES SERIES
Discharge: HOME | End: 2023-03-10
Payer: MEDICARE

## 2023-03-10 ENCOUNTER — APPOINTMENT (OUTPATIENT)
Dept: PHYSICAL THERAPY | Age: 68
End: 2023-03-10
Payer: MEDICARE

## 2023-03-10 PROCEDURE — 97012 MECHANICAL TRACTION THERAPY: CPT

## 2023-03-10 PROCEDURE — 97035 APP MDLTY 1+ULTRASOUND EA 15: CPT

## 2023-03-10 NOTE — PROGRESS NOTES
[address] [facility]  PHYSICAL THERAPY  [] EVALUATION  [x] DAILY NOTE (LAND) [] DAILY NOTE (AQUATIC ) [] PROGRESS NOTE [] DISCHARGE NOTE    [x] OUTPATIENT REHABILITATION [facility]   [] [place] [address]    [] [facility]   [] Sil [employer]    Date: 3/10/2023  Patient Name:  Lenore Zaragoza  : 1955  MRN: [id number]  CSN: [id number]    Referring Practitioner Margarito Sanchez MD   Diagnosis Other spondylosis, cervical region [M47.892]  Radiculopathy, cervical region [M54.12]     Treatment Diagnosis L UE weakness, L UE pain, neck pain   Date of Evaluation 23   Additional Pertinent History HTN, DM with only occasional checking sugars- 150-200, Obesity, L shoulder weakness x 20 years ago when  heavy object and \"I think I tore my rotator cuff\"      Functional Outcome Measure Used Neck disability scale   Functional Outcome Score Eval score 16  (23)       Insurance: Primary: Payor: MEDICARE /  /  / ,   Secondary: [facility]   Authorization Information: INSURANCE PAYS AT:   80%              PATIENT RESPONSIBILITY AND/OR CO-PAY:  20%  SECONDARY INSURANCE COMPANY: .  [facility]     PRECERTIFICATION REQUIRED:  No  INSURANCE THERAPY BENEFIT:  Patient has unlimited visits based on medical necessity  Benefit will not cover maintenance or preventative treatment. AQUATIC THERAPY COVERED:   Yes  MODALITIES COVERED:  Yes, with the exception of iontophoresis and hot packs/cold packs   Visit # 6, 6/10 for progress note   Visits Allowed: unlimited   Recertification Date: 3/21/17   Physician Follow-Up: 3/23/23   Physician Orders:    History of Present Illness: 23 had L UE parathyroid and tumor, woke up with L UE numbness from neck to hand. Had EMG by Dr. Siddhartha Goncalves on 10/20/22 and dx with L carpal tunnel and L C5-6 cervical radiculopathy . Patient had L Carpal tunnel surgery by Dr. Christiano Sanchez on 11/15/22 but did not have any relief in numbness of L hand. Patient to neck specialist in [place] at [facility] on 2/14/23, xrays done, PT and MRI ordered and then to call to schedule f/u with MD.    Patient does have home cervical traction unit he can borrow- brought 2/24/23 and advised to use in supine, 10 minutes 2 x per day. SUBJECTIVE: Pt reports continued left hand numbness in fingers 1-4.        OBJECTIVE:    TREATMENT   Precautions: None currently, L carpal surgery on 11/15/22 with no current restrictions   Pain: Denies pain, numbness L hand digit 1-4 , neck pain 0/10    \"X in shaded column indicates activity completed today    *\" next to exercise/intervention indicates progression   Modalities Parameters/  Location  Notes   % pulsed, 8 minutes cervical spine 8 minutes, 1.0 intensity x    Mechanical cervical traction* 12 min, static, 50% speed, ~30 deg angle, 20-25# x          Manual Therapy Time/Technique  Notes                     Exercise/Intervention   Notes   Cervical retraction sitting Minimal pressure by PT 3 x 10 no change      Postural                                                                                                                                                                                                                                                                                                                                                                                                                                                                                                                                                                                                                                                                                                         education for cervical neutral positioning       Sitting cervical retraction and extension 25%, 50% 2 x 30 seconds no change      Supine 2 yellow pillows 3 minutes      Cervical retraction by PT 3x 10 no change      Manual cervical traction  7 x 30 seconds   No change   Sitting scapular retraction 15x      Backward shoulder circles 15x      Sitting cervical isometrics rotation 5x 5s                     Specific Interventions Next Treatment: lower cervical mechanical progression in attempts to centralize and lessen L UE hand s/s as well as manual cervical traction, mechanical traction, family does have some sort of unit but patient unsure what it is, will bring in and PT determine if this unit is the correct unit or if needs to get another unity, patient does utilize [facility]'s home medical is DME is needed. Modalities as needed with IFC or US, may have to utilize mechanical cervical traction in [facility] if needed    Activity/Treatment Tolerance:  [x]  Patient tolerated treatment well  []  Patient limited by fatigue  []  Patient limited by pain   []  Patient limited by medical complications  []  Other:     Assessment:  Initiated cervical mechanical traction at 25# of force without change in left hand numbness. Continued with ultrasound to left cervical region without change in symptoms. If no adverse effects from traction, he may benefit from another round to increase the force. Pt to follow up with Matt Gaona in [facility] Monday to discuss response to today's treatment. GOALS:  Patient Goal: to get this hand s/s to go away    Short Term Goals:  Time Frame: 4 weeks.   Increase AROM cervical rotation B to 80 degrees to allow patient to report decreased L hand s/s to 4/10 high with dressing and doing buttons with less difficulty  I with HEP as prescribed to allow patient to demonstrate improved  strength L hand to 50# with patient able to  hold pen for suduko with less difficulty    Long Term Goals:  Time Frame: 6 weeks  Increase AROM cervical lateral flexion to 45 , L shoulder flexion and abduction to 170 degrees to allow patient to report decreased s/s L hand to high 1/10 with no difficulty holding coffee cup  Increase strength cervical isometric to fair, L hand pinch strength to within 2 # of right hand pinch to improve ease doing zippers  I with HEP as prescribed to allow patient progress to splitting wood with no difficulty    Patient Education:   [x]  HEP/Education Completed: monitor response to traction  []  No new Education completed  []  Reviewed Prior HEP      [x]  Patient verbalized and/or demonstrated understanding of education provided. []  Patient unable to verbalize and/or demonstrate understanding of education provided. Will continue education. [x]  Barriers to learning: none    PLAN:  Treatment Recommendations: Strengthening, Range of Motion, Manual Therapy - Soft Tissue Mobilization, Pain Management, Home Exercise Program, Patient Education, and Modalities    []  Plan of care initiated. Plan to see patient 2 times per week for 6 weeks to address the treatment planned outlined above.   [x]  Continue with current plan of care  []  Modify plan of care as follows:    []  Hold pending physician visit  []  Discharge    Time In 0727   Time Out 0800   Timed Code Minutes: 15 min   Total Treatment Time: 33 min       Electronically Signed by: Rohini Winkler PT

## 2023-03-13 ENCOUNTER — HOSPITAL ENCOUNTER (OUTPATIENT)
Dept: PHYSICAL THERAPY | Age: 68
Setting detail: THERAPIES SERIES
Discharge: HOME | End: 2023-03-13
Payer: MEDICARE

## 2023-03-13 PROCEDURE — 97035 APP MDLTY 1+ULTRASOUND EA 15: CPT

## 2023-03-13 PROCEDURE — 97110 THERAPEUTIC EXERCISES: CPT

## 2023-03-13 NOTE — PROGRESS NOTES
[address] [facility]  PHYSICAL THERAPY  [] EVALUATION  [x] DAILY NOTE (LAND) [] DAILY NOTE (AQUATIC ) [] PROGRESS NOTE [] DISCHARGE NOTE    [] [facility]   [x] [facility]    [] [facility]   [] Gerson See    Date: 3/13/2023  Patient Name:  Felicity Lama  : 1955  MRN: [id number]  CSN: [id number]    Referring Practitioner Seferino Page MD   Diagnosis Other spondylosis, cervical region [M47.892]  Radiculopathy, cervical region [M54.12]    Treatment Diagnosis L UE weakness, L UE pain, neck pain   Date of Evaluation 23   Additional Pertinent History HTN, DM with only occasional checking sugars- 150-200, Obesity, L shoulder weakness x 20 years ago when  heavy object and \"I think I tore my rotator cuff\"      Functional Outcome Measure Used Neck disability scale   Functional Outcome Score Eval score 16  (23)       Insurance: Primary: Payor: MEDICARE /  /  / ,   Secondary: [employer] [facility]   Authorization Information: INSURANCE PAYS AT:   80%              PATIENT RESPONSIBILITY AND/OR CO-PAY:  20%  SECONDARY INSURANCE COMPANY: .  [employer] [facility]     PRECERTIFICATION REQUIRED:  No  INSURANCE THERAPY BENEFIT:  Patient has unlimited visits based on medical necessity  Benefit will not cover maintenance or preventative treatment. AQUATIC THERAPY COVERED:   Yes  MODALITIES COVERED:  Yes, with the exception of iontophoresis and hot packs/cold packs   Visit # 7, 7/10 for progress note   Visits Allowed: unlimited   Recertification Date:    Physician Follow-Up: 3/23   Physician Orders:    History of Present Illness: 22 had L UE parathyroid and tumor, woke up with L UE numbness from neck to hand. Had EMG by Dr. Eda Lagunas on 10/20/22 and dx with L carpal tunnel and L C5-6 cervical radiculopathy . Patient had L Carpal tunnel surgery by Dr. Beverley Ang on 11/15/22 but did not have any relief in numbness of L hand.   Patient to neck specialist in [place] at [facility] on 2/14/23, xrays done, PT and MRI ordered and then to call to schedule f/u with MD.    Patient does have home cervical traction unit he can borrow- brought 2/24/23 and advised to use in supine, 10 minutes 2 x per day. SUBJECTIVE: reports neck pain today 1/10  L hand numbness/stinging 100% of the day, high decreased 3/10 in thumb to 2/10 in past 2 days, s/s have remained in L hand finger 2-4 at 3/10 over past 2-3 days.       OBJECTIVE:    TREATMENT   Precautions: None currently, L carpal surgery on 11/15/22 with no current restrictions   Pain: Denies pain, numbness/stinging L hand digit 1-4 , 3/10, neck pain 1/10    \"X in shaded column indicates activity completed today    *\" next to exercise/intervention indicates progression   Modalities Parameters/  Location  Notes   % pulsed, 8 minutes cervical spine 8 minutes, 1.0 intensity x                Manual Therapy Time/Technique  Notes                     Exercise/Intervention   Notes   Cervical retraction sitting Minimal pressure by PT 3 x 10 no change  x    Postural                                                                                                                                                                                                                                                                                                                                                                                                                                                                                                                                                                                                                                                                                                         education for cervical neutral positioning   x    Sitting cervical retraction and extension 25%, 50% 2 x 30 seconds no change  x    Supine 2 yellow pillows 3 minutes  x    Cervical retraction by PT 3x 10 no change  x    Manual cervical traction  7 x 30 seconds  x No change   Sitting scapular retraction 15x  x    Backward shoulder circles 15x  x    Sitting cervical isometrics rotation 10x 5s x    Sitting cervical isometrics flexion R hand on forehead 10x 5s x    Sitting cervical isometric extension R hand on back of head 10x 5s x      Specific Interventions Next Treatment: lower cervical mechanical progression in attempts to centralize and lessen L UE hand s/s as well as manual cervical traction, mechanical traction, family does have some sort of unit but patient unsure what it is, will bring in and PT determine if this unit is the correct unit or if needs to get another unity, patient does utilize [facility]. [facility] is DME is needed. Modalities as needed with IF or US, may have to utilize mechanical cervical traction in [facility] if needed    Activity/Treatment Tolerance:  [x]  Patient tolerated treatment well  []  Patient limited by fatigue  []  Patient limited by pain   []  Patient limited by medical complications  []  Other:     Assessment:  added isometric flexion and extension to HEP with handout given. Patient did want to try 1 more session with mechanical cervical traction with  more pull, scheduled for 3/15. GOALS:  Patient Goal: to get this hand s/s to go away    Short Term Goals:  Time Frame: 4 weeks.   Increase AROM cervical rotation B to 80 degrees to allow patient to report decreased L hand s/s to 4/10 high with dressing and doing buttons with less difficulty  I with HEP as prescribed to allow patient to demonstrate improved  strength L hand to 50# with patient able to  hold pen for suduko with less difficulty    Long Term Goals:  Time Frame: 6 weeks  Increase AROM cervical lateral flexion to 45 , L shoulder flexion and abduction to 170 degrees to allow patient to report decreased s/s L hand to high 1/10 with no difficulty holding coffee cup  Increase strength cervical isometric to fair, L hand pinch strength to within 2 # of right hand pinch to improve ease doing zippers  I with HEP as prescribed to allow patient progress to splitting wood with no difficulty    Patient Education:   [x]  HEP/Education Completed: handout for cervical isometric, scapular retraction, backward shoulder circles  []  No new Education completed  []  Reviewed Prior HEP      []  Patient verbalized and/or demonstrated understanding of education provided. []  Patient unable to verbalize and/or demonstrate understanding of education provided. Will continue education. [x]  Barriers to learning: none    PLAN:  Treatment Recommendations: Strengthening, Range of Motion, Manual Therapy - Soft Tissue Mobilization, Pain Management, Home Exercise Program, Patient Education, and Modalities    []  Plan of care initiated. Plan to see patient 2 times per week for 6 weeks to address the treatment planned outlined above.   [x]  Continue with current plan of care  []  Modify plan of care as follows:    []  Hold pending physician visit  []  Discharge    Time In 800   Time Out 830   Timed Code Minutes: 30 min   Total Treatment Time: 30 min       Electronically Signed by: Debbi Bacon PT

## 2023-03-15 ENCOUNTER — HOSPITAL ENCOUNTER (OUTPATIENT)
Dept: PHYSICAL THERAPY | Age: 68
Setting detail: THERAPIES SERIES
Discharge: HOME | End: 2023-03-15
Payer: MEDICARE

## 2023-03-15 PROCEDURE — 97012 MECHANICAL TRACTION THERAPY: CPT

## 2023-03-15 NOTE — PROGRESS NOTES
[address] [facility]  PHYSICAL THERAPY  [] EVALUATION  [x] DAILY NOTE (LAND) [] DAILY NOTE (AQUATIC ) [] PROGRESS NOTE [] DISCHARGE NOTE    [x] OUTPATIENT REHABILITATION [facility]   [] [place] [address]    [] [facility]   [] [facility] [place]    Date: 3/15/2023  Patient Name:  Rinku Valles  : 1955  MRN: [id number]  CSN: [id number]    Referring Practitioner Vidhya Austin MD   Diagnosis Other spondylosis, cervical region [M47.892]  Radiculopathy, cervical region [M54.12]    Treatment Diagnosis L UE weakness, L UE pain, neck pain   Date of Evaluation 23   Additional Pertinent History HTN, DM with only occasional checking sugars- 150-200, Obesity, L shoulder weakness x 20 years ago when  heavy object and \"I think I tore my rotator cuff\"      Functional Outcome Measure Used Neck disability scale   Functional Outcome Score Eval score 16  (23)       Insurance: Primary: Payor: MEDICARE /  /  / ,   Secondary: [employer] [facility]   Authorization Information: INSURANCE PAYS AT:   80%              PATIENT RESPONSIBILITY AND/OR CO-PAY:  20%  SECONDARY INSURANCE COMPANY: .  [employer] [facility]     PRECERTIFICATION REQUIRED:  No  INSURANCE THERAPY BENEFIT:  Patient has unlimited visits based on medical necessity  Benefit will not cover maintenance or preventative treatment. AQUATIC THERAPY COVERED:   Yes  MODALITIES COVERED:  Yes, with the exception of iontophoresis and hot packs/cold packs   Visit # 8, 8/10 for progress note   Visits Allowed: unlimited   Recertification Date:    Physician Follow-Up: 3/23   Physician Orders:    History of Present Illness: 23 had L UE parathyroid and tumor, woke up with L UE numbness from neck to hand. Had EMG by Dr. Ross Fitzgerald on 10/20/22 and dx with L carpal tunnel and L C5-6 cervical radiculopathy . Patient had L Carpal tunnel surgery by Dr. Donavon Galvan on 11/15/22 but did not have any relief in numbness of L hand.   Patient to neck specialist in [place] at [facility] on 2/14/23, xrays done, PT and MRI ordered and then to call to schedule f/u with MD.    Patient does have home cervical traction unit he can borrow- brought 2/24/23 and advised to use in supine, 10 minutes 2 x per day.     SUBJECTIVE: Pt reports no change in left hand numbness. Pt notes his neck felt a little stiff after last traction treatment.     OBJECTIVE:    TREATMENT   Precautions: None currently, L carpal surgery on 11/15/22 with no current restrictions   Pain: Denies pain, numbness/stinging L hand digit 1-4 , 3/10, neck pain 1/10    \"X” in shaded column indicates activity completed today    “*\" next to exercise/intervention indicates progression   Modalities Parameters/  Location  Notes   % pulsed, 8 minutes cervical spine 8 minutes, 1.0 intensity x                Manual Therapy Time/Technique  Notes   Cervical mechanical traction 15 min, static, 50% speed, ~30 deg angle, 30-40# x Started at 30# and increased by 3# ~every 3 minutes               Exercise/Intervention   Notes   Cervical retraction sitting Minimal pressure by PT 3 x 10 no change      Postural                                                                                                                                                                                                                                                                                                                                                                                                                                                                                                                                                                                                                                                                                                         education for cervical neutral positioning       Sitting cervical retraction and extension 25%, 50% 2 x 30 seconds no change     Supine 2 yellow pillows 3 minutes      Cervical retraction by PT 3x 10 no change      Manual cervical traction  7 x 30 seconds   No change   Sitting scapular retraction 15x      Backward shoulder circles 15x      Sitting cervical isometrics rotation 10x 5s     Sitting cervical isometrics flexion R hand on forehead 10x 5s     Sitting cervical isometric extension R hand on back of head 10x 5s       Specific Interventions Next Treatment: lower cervical mechanical progression in attempts to centralize and lessen L UE hand s/s as well as manual cervical traction, mechanical traction, family does have some sort of unit but patient unsure what it is, will bring in and PT determine if this unit is the correct unit or if needs to get another unity, patient does utilize [facility]. [employer] is DME is needed. Modalities as needed with IFC or US, may have to utilize mechanical cervical traction in [facility] if needed    Activity/Treatment Tolerance:  [x]  Patient tolerated treatment well  []  Patient limited by fatigue  []  Patient limited by pain   []  Patient limited by medical complications  []  Other:     Assessment:  Cervical mechanical traction completed increasing to 40 pounds of force throughout time. No change in left hand numbness post-treatment. Held ultrasound d/t not providing any change in symptoms over past 6 treatments. GOALS:  Patient Goal: to get this hand s/s to go away    Short Term Goals:  Time Frame: 4 weeks.   Increase AROM cervical rotation B to 80 degrees to allow patient to report decreased L hand s/s to 4/10 high with dressing and doing buttons with less difficulty  I with HEP as prescribed to allow patient to demonstrate improved  strength L hand to 50# with patient able to  hold pen for suduko with less difficulty    Long Term Goals:  Time Frame: 6 weeks  Increase AROM cervical lateral flexion to 45 , L shoulder flexion and abduction to 170 degrees to allow patient to report decreased s/s L hand to high 1/10 with no difficulty holding coffee cup  Increase strength cervical isometric to fair, L hand pinch strength to within 2 # of right hand pinch to improve ease doing zippers  I with HEP as prescribed to allow patient progress to splitting wood with no difficulty    Patient Education:   [x]  HEP/Education Completed: monitor response to increased force with traction  []  No new Education completed  []  Reviewed Prior HEP      []  Patient verbalized and/or demonstrated understanding of education provided. []  Patient unable to verbalize and/or demonstrate understanding of education provided. Will continue education. []  Barriers to learning: none    PLAN:  Treatment Recommendations: Strengthening, Range of Motion, Manual Therapy - Soft Tissue Mobilization, Pain Management, Home Exercise Program, Patient Education, and Modalities    []  Plan of care initiated. Plan to see patient 2 times per week for 6 weeks to address the treatment planned outlined above.   [x]  Continue with current plan of care  []  Modify plan of care as follows:    []  Hold pending physician visit  []  Discharge    Time In 1000   Time Out 1018   Timed Code Minutes: 0 min   Total Treatment Time: 18 min       Electronically Signed by: Isra Price, PT

## 2023-03-16 ENCOUNTER — APPOINTMENT (OUTPATIENT)
Dept: PHYSICAL THERAPY | Age: 68
End: 2023-03-16
Payer: MEDICARE

## 2023-03-21 ENCOUNTER — HOSPITAL ENCOUNTER (OUTPATIENT)
Dept: PHYSICAL THERAPY | Age: 68
Setting detail: THERAPIES SERIES
Discharge: HOME | End: 2023-03-21
Payer: MEDICARE

## 2023-03-21 PROCEDURE — 97140 MANUAL THERAPY 1/> REGIONS: CPT

## 2023-03-21 PROCEDURE — 97110 THERAPEUTIC EXERCISES: CPT

## 2023-03-21 NOTE — PROGRESS NOTES
Frame: 6 weeks  Increase AROM cervical lateral flexion to 45 , L shoulder flexion and abduction to 170 degrees to allow patient to report decreased s/s L hand to high 1/10 with no difficulty holding coffee cup  Increase strength cervical isometric to fair, L hand pinch strength to within 2 # of right hand pinch to improve ease doing zippers  I with HEP as prescribed to allow patient progress to splitting wood with no difficulty    Patient Education:   [x]  HEP/Education Completed: monitor response to increased force with traction  []  No new Education completed  []  Reviewed Prior HEP      []  Patient verbalized and/or demonstrated understanding of education provided. []  Patient unable to verbalize and/or demonstrate understanding of education provided. Will continue education. []  Barriers to learning: none    PLAN:  Treatment Recommendations: Strengthening, Range of Motion, Manual Therapy - Soft Tissue Mobilization, Pain Management, Home Exercise Program, Patient Education, and Modalities    []  Plan of care initiated. Plan to see patient 2 times per week for 6 weeks to address the treatment planned outlined above.   [x]  Continue with current plan of care  []  Modify plan of care as follows:    []  Hold pending physician visit  []  Discharge    Time In 806   Time Out 834   Timed Code Minutes: 23 min   Total Treatment Time: 28 min       Electronically Signed by: Jamaal Vanessa PTA

## 2023-03-23 NOTE — DISCHARGE SUMMARY
[facility] NOTE  OUTPATIENT  [address] [facility].    Patient Name: Donnell Shane        CSN: [id number]   YOB: 1955  Gender: male  Ney Haywood MD,    Other spondylosis, cervical region [M47.892]  Radiculopathy, cervical region [M54.12] ,      Patient is discharged from Physical Therapy services at this time. See last note for details related to results of therapy and goal achievement. Reason for discharge:  Patient saw MD today, will be having surgery. Will discharge at this time . Nanette [facility] # [id number]

## 2023-03-24 ENCOUNTER — HOSPITAL ENCOUNTER (OUTPATIENT)
Dept: PHYSICAL THERAPY | Age: 68
Setting detail: THERAPIES SERIES
Discharge: HOME | End: 2023-03-24
Payer: MEDICARE

## 2023-03-28 ENCOUNTER — APPOINTMENT (OUTPATIENT)
Dept: PHYSICAL THERAPY | Age: 68
End: 2023-03-28
Payer: MEDICARE

## 2023-03-31 ENCOUNTER — APPOINTMENT (OUTPATIENT)
Dept: PHYSICAL THERAPY | Age: 68
End: 2023-03-31
Payer: MEDICARE

## 2023-04-12 ENCOUNTER — HOSPITAL ENCOUNTER (OUTPATIENT)
Age: 68
Discharge: HOME | End: 2023-04-12
Payer: MEDICARE

## 2023-04-12 LAB
ANION GAP SERPL CALC-SCNC: 12 MEQ/L (ref 8–16)
BASOPHILS ABSOLUTE: 0.1 THOU/MM3 (ref 0–0.1)
BASOPHILS NFR BLD AUTO: 0.9 %
BUN SERPL-MCNC: 23 MG/DL (ref 7–22)
CALCIUM SERPL-MCNC: 10 MG/DL (ref 8.5–10.5)
CHLORIDE SERPL-SCNC: 102 MEQ/L (ref 98–111)
CO2 SERPL-SCNC: 27 MEQ/L (ref 23–33)
CREAT SERPL-MCNC: 1.3 MG/DL (ref 0.4–1.2)
DEPRECATED RDW RBC AUTO: 46 FL (ref 35–45)
EOSINOPHIL NFR BLD AUTO: 4.4 %
EOSINOPHILS ABSOLUTE: 0.4 THOU/MM3 (ref 0–0.4)
ERYTHROCYTE [DISTWIDTH] IN BLOOD BY AUTOMATED COUNT: 13.7 % (ref 11.5–14.5)
GFR SERPL CREATININE-BSD FRML MDRD: 60 ML/MIN/1.73M2
GLUCOSE SERPL-MCNC: 113 MG/DL (ref 70–108)
HCT VFR BLD AUTO: 45.2 % (ref 42–52)
HGB BLD-MCNC: 14.3 GM/DL (ref 14–18)
IMM GRANULOCYTES # BLD AUTO: 0.12 THOU/MM3 (ref 0–0.07)
IMM GRANULOCYTES NFR BLD AUTO: 1.3 %
LYMPHOCYTES ABSOLUTE: 2.5 THOU/MM3 (ref 1–4.8)
LYMPHOCYTES NFR BLD AUTO: 27.7 %
MCH RBC QN AUTO: 29.2 PG (ref 26–33)
MCHC RBC AUTO-ENTMCNC: 31.6 GM/DL (ref 32.2–35.5)
MCV RBC AUTO: 92.4 FL (ref 80–94)
MONOCYTES ABSOLUTE: 0.9 THOU/MM3 (ref 0.4–1.3)
MONOCYTES NFR BLD AUTO: 9.4 %
NEUTROPHILS NFR BLD AUTO: 56.3 %
NRBC BLD AUTO-RTO: 0 /100 WBC
PLATELET # BLD AUTO: 250 THOU/MM3 (ref 130–400)
PMV BLD AUTO: 10.7 FL (ref 9.4–12.4)
POTASSIUM SERPL-SCNC: 4 MEQ/L (ref 3.5–5.2)
RBC # BLD AUTO: 4.89 MILL/MM3 (ref 4.7–6.1)
SEGMENTED NEUTROPHILS ABSOLUTE COUNT: 5.1 THOU/MM3 (ref 1.8–7.7)
SODIUM SERPL-SCNC: 141 MEQ/L (ref 135–145)
WBC # BLD AUTO: 9.1 THOU/MM3 (ref 4.8–10.8)

## 2023-04-12 PROCEDURE — 85025 COMPLETE CBC W/AUTO DIFF WBC: CPT

## 2023-04-12 PROCEDURE — 80048 BASIC METABOLIC PNL TOTAL CA: CPT

## 2023-04-12 PROCEDURE — 36415 COLL VENOUS BLD VENIPUNCTURE: CPT

## 2023-04-25 ENCOUNTER — HOSPITAL ENCOUNTER (OUTPATIENT)
Age: 68
Discharge: HOME | End: 2023-04-25
Payer: MEDICARE

## 2023-04-25 LAB
BACTERIA URNS QL MICRO: ABNORMAL /HPF
BASOPHILS ABSOLUTE: 0.1 THOU/MM3 (ref 0–0.1)
BASOPHILS NFR BLD AUTO: 1 %
BILIRUB UR QL STRIP.AUTO: NEGATIVE
CASTS #/AREA URNS LPF: ABNORMAL /LPF
CASTS 2: ABNORMAL /LPF
CHARACTER UR: CLEAR
COLOR: YELLOW
CREAT UR-MCNC: 118.1 MG/DL
CRYSTALS URNS MICRO: ABNORMAL
DEPRECATED RDW RBC AUTO: 45.4 FL (ref 35–45)
EOSINOPHIL NFR BLD AUTO: 3.9 %
EOSINOPHILS ABSOLUTE: 0.4 THOU/MM3 (ref 0–0.4)
EPITHELIAL CELLS, UA: ABNORMAL /HPF
ERYTHROCYTE [DISTWIDTH] IN BLOOD BY AUTOMATED COUNT: 13.5 % (ref 11.5–14.5)
GLUCOSE UR QL STRIP.AUTO: NEGATIVE MG/DL
HCT VFR BLD AUTO: 45.4 % (ref 42–52)
HGB BLD-MCNC: 14.3 GM/DL (ref 14–18)
HGB UR QL STRIP.AUTO: NEGATIVE
IMM GRANULOCYTES # BLD AUTO: 0.12 THOU/MM3 (ref 0–0.07)
IMM GRANULOCYTES NFR BLD AUTO: 1.3 %
IONIZED CALCIUM, WHOLE BLOOD: 1.37 MMOL/L (ref 1.12–1.32)
KETONES UR QL STRIP.AUTO: NEGATIVE
LYMPHOCYTES ABSOLUTE: 2.3 THOU/MM3 (ref 1–4.8)
LYMPHOCYTES NFR BLD AUTO: 25.4 %
MCH RBC QN AUTO: 28.8 PG (ref 26–33)
MCHC RBC AUTO-ENTMCNC: 31.5 GM/DL (ref 32.2–35.5)
MCV RBC AUTO: 91.5 FL (ref 80–94)
MISCELLANEOUS 2: ABNORMAL
MONOCYTES ABSOLUTE: 0.8 THOU/MM3 (ref 0.4–1.3)
MONOCYTES NFR BLD AUTO: 8.6 %
NEUTROPHILS NFR BLD AUTO: 59.8 %
NITRITE UR QL STRIP: NEGATIVE
NRBC BLD AUTO-RTO: 0 /100 WBC
PH UR STRIP.AUTO: 6 [PH] (ref 5–9)
PLATELET # BLD AUTO: 269 THOU/MM3 (ref 130–400)
PMV BLD AUTO: 10.7 FL (ref 9.4–12.4)
PROT UR STRIP.AUTO-MCNC: 300 MG/DL
PROT UR-MCNC: 125.8 MG/DL
PROT/CREAT 24H UR: 1.07 MG/G{CREAT}
RBC # BLD AUTO: 4.96 MILL/MM3 (ref 4.7–6.1)
RBC URINE: ABNORMAL /HPF
RENAL EPI CELLS #/AREA URNS HPF: ABNORMAL /[HPF]
SEGMENTED NEUTROPHILS ABSOLUTE COUNT: 5.4 THOU/MM3 (ref 1.8–7.7)
SP GR UR REFRACT.AUTO: 1.01 (ref 1–1.03)
UROBILINOGEN, URINE: 0.2 EU/DL (ref 0–1)
WBC # BLD AUTO: 9 THOU/MM3 (ref 4.8–10.8)
WBC #/AREA URNS HPF: ABNORMAL /HPF
WBC #/AREA URNS HPF: NEGATIVE /[HPF]
YEAST LIKE FUNGI URNS QL MICRO: ABNORMAL

## 2023-04-25 PROCEDURE — 82330 ASSAY OF CALCIUM: CPT

## 2023-04-25 PROCEDURE — 84156 ASSAY OF PROTEIN URINE: CPT

## 2023-04-25 PROCEDURE — 82306 VITAMIN D 25 HYDROXY: CPT

## 2023-04-25 PROCEDURE — 81001 URINALYSIS AUTO W/SCOPE: CPT

## 2023-04-25 PROCEDURE — 84550 ASSAY OF BLOOD/URIC ACID: CPT

## 2023-04-25 PROCEDURE — 36415 COLL VENOUS BLD VENIPUNCTURE: CPT

## 2023-04-25 PROCEDURE — 84100 ASSAY OF PHOSPHORUS: CPT

## 2023-04-25 PROCEDURE — 82570 ASSAY OF URINE CREATININE: CPT

## 2023-04-25 PROCEDURE — 83735 ASSAY OF MAGNESIUM: CPT

## 2023-04-25 PROCEDURE — 83970 ASSAY OF PARATHORMONE: CPT

## 2023-04-25 PROCEDURE — 85025 COMPLETE CBC W/AUTO DIFF WBC: CPT

## 2023-04-25 PROCEDURE — 80048 BASIC METABOLIC PNL TOTAL CA: CPT

## 2023-04-26 LAB
25(OH)D3 SERPL-MCNC: 51 NG/ML (ref 30–100)
ANION GAP SERPL CALC-SCNC: 12 MEQ/L (ref 8–16)
BUN SERPL-MCNC: 30 MG/DL (ref 7–22)
CALCIUM SERPL-MCNC: 10.4 MG/DL (ref 8.5–10.5)
CHLORIDE SERPL-SCNC: 104 MEQ/L (ref 98–111)
CO2 SERPL-SCNC: 25 MEQ/L (ref 23–33)
CREAT SERPL-MCNC: 1.7 MG/DL (ref 0.4–1.2)
GFR SERPL CREATININE-BSD FRML MDRD: 44 ML/MIN/1.73M2
GLUCOSE SERPL-MCNC: 126 MG/DL (ref 70–108)
MAGNESIUM SERPL-MCNC: 1.9 MG/DL (ref 1.6–2.4)
PHOSPHATE SERPL-MCNC: 3.1 MG/DL (ref 2.4–4.7)
POTASSIUM SERPL-SCNC: 4 MEQ/L (ref 3.5–5.2)
PTH-INTACT SERPL-MCNC: 58.7 PG/ML (ref 15–65)
SODIUM SERPL-SCNC: 141 MEQ/L (ref 135–145)
URATE SERPL-MCNC: 7.8 MG/DL (ref 3.7–7)

## 2023-05-18 ENCOUNTER — HOSPITAL ENCOUNTER (OUTPATIENT)
Age: 68
Discharge: HOME | End: 2023-05-18
Payer: MEDICARE

## 2023-05-18 LAB
ANION GAP SERPL CALC-SCNC: 13 MEQ/L (ref 8–16)
BACTERIA URNS QL MICRO: ABNORMAL /HPF
BASOPHILS ABSOLUTE: 0.1 THOU/MM3 (ref 0–0.1)
BASOPHILS NFR BLD AUTO: 0.6 %
BILIRUB UR QL STRIP.AUTO: NEGATIVE
BUN SERPL-MCNC: 32 MG/DL (ref 7–22)
CALCIUM SERPL-MCNC: 10.2 MG/DL (ref 8.5–10.5)
CASTS #/AREA URNS LPF: ABNORMAL /LPF
CASTS 2: ABNORMAL /LPF
CHARACTER UR: CLEAR
CHLORIDE SERPL-SCNC: 102 MEQ/L (ref 98–111)
CO2 SERPL-SCNC: 25 MEQ/L (ref 23–33)
COLOR: YELLOW
CREAT SERPL-MCNC: 1.5 MG/DL (ref 0.4–1.2)
CREAT UR-MCNC: 60.2 MG/DL
CREAT UR-MCNC: 60.2 MG/DL
CRYSTALS URNS MICRO: ABNORMAL
DEPRECATED RDW RBC AUTO: 46.2 FL (ref 35–45)
EOSINOPHIL NFR BLD AUTO: 3.3 %
EOSINOPHILS ABSOLUTE: 0.3 THOU/MM3 (ref 0–0.4)
EPITHELIAL CELLS, UA: ABNORMAL /HPF
ERYTHROCYTE [DISTWIDTH] IN BLOOD BY AUTOMATED COUNT: 13.6 % (ref 11.5–14.5)
GFR SERPL CREATININE-BSD FRML MDRD: 51 ML/MIN/1.73M2
GLUCOSE SERPL-MCNC: 130 MG/DL (ref 70–108)
GLUCOSE UR QL STRIP.AUTO: NEGATIVE MG/DL
HCT VFR BLD AUTO: 46.2 % (ref 42–52)
HGB BLD-MCNC: 14.7 GM/DL (ref 14–18)
HGB UR QL STRIP.AUTO: NEGATIVE
IMM GRANULOCYTES # BLD AUTO: 0.16 THOU/MM3 (ref 0–0.07)
IMM GRANULOCYTES NFR BLD AUTO: 1.7 %
IONIZED CALCIUM, WHOLE BLOOD: 1.26 MMOL/L (ref 1.12–1.32)
KETONES UR QL STRIP.AUTO: NEGATIVE
LYMPHOCYTES ABSOLUTE: 2.1 THOU/MM3 (ref 1–4.8)
LYMPHOCYTES NFR BLD AUTO: 22.3 %
MAGNESIUM SERPL-MCNC: 2.1 MG/DL (ref 1.6–2.4)
MCH RBC QN AUTO: 29.3 PG (ref 26–33)
MCHC RBC AUTO-ENTMCNC: 31.8 GM/DL (ref 32.2–35.5)
MCV RBC AUTO: 92 FL (ref 80–94)
MICROALBUMIN UR-MCNC: 56.53 MG/DL
MICROALBUMIN/CREAT RATIO PNL UR: 939 MG/G (ref 0–30)
MISCELLANEOUS 2: ABNORMAL
MONOCYTES ABSOLUTE: 0.9 THOU/MM3 (ref 0.4–1.3)
MONOCYTES NFR BLD AUTO: 9.1 %
NEUTROPHILS NFR BLD AUTO: 63 %
NITRITE UR QL STRIP: NEGATIVE
NRBC BLD AUTO-RTO: 0 /100 WBC
PH UR STRIP.AUTO: 6 [PH] (ref 5–9)
PLATELET # BLD AUTO: 260 THOU/MM3 (ref 130–400)
PMV BLD AUTO: 10.7 FL (ref 9.4–12.4)
POTASSIUM SERPL-SCNC: 4.2 MEQ/L (ref 3.5–5.2)
PROT UR STRIP.AUTO-MCNC: 100 MG/DL
PROT UR-MCNC: 81.8 MG/DL
PROT/CREAT 24H UR: 1.36 MG/G{CREAT}
PTH-INTACT SERPL-MCNC: 85.2 PG/ML (ref 15–65)
RBC # BLD AUTO: 5.02 MILL/MM3 (ref 4.7–6.1)
RBC URINE: ABNORMAL /HPF
RENAL EPI CELLS #/AREA URNS HPF: ABNORMAL /[HPF]
SEGMENTED NEUTROPHILS ABSOLUTE COUNT: 6 THOU/MM3 (ref 1.8–7.7)
SODIUM SERPL-SCNC: 140 MEQ/L (ref 135–145)
SP GR UR REFRACT.AUTO: 1.01 (ref 1–1.03)
URATE SERPL-MCNC: 8.6 MG/DL (ref 3.7–7)
UROBILINOGEN, URINE: 0.2 EU/DL (ref 0–1)
WBC # BLD AUTO: 9.6 THOU/MM3 (ref 4.8–10.8)
WBC #/AREA URNS HPF: ABNORMAL /HPF
WBC #/AREA URNS HPF: NEGATIVE /[HPF]
YEAST LIKE FUNGI URNS QL MICRO: ABNORMAL

## 2023-05-18 PROCEDURE — 82570 ASSAY OF URINE CREATININE: CPT

## 2023-05-18 PROCEDURE — 81001 URINALYSIS AUTO W/SCOPE: CPT

## 2023-05-18 PROCEDURE — 84550 ASSAY OF BLOOD/URIC ACID: CPT

## 2023-05-18 PROCEDURE — 82330 ASSAY OF CALCIUM: CPT

## 2023-05-18 PROCEDURE — 85025 COMPLETE CBC W/AUTO DIFF WBC: CPT

## 2023-05-18 PROCEDURE — 84156 ASSAY OF PROTEIN URINE: CPT

## 2023-05-18 PROCEDURE — 82043 UR ALBUMIN QUANTITATIVE: CPT

## 2023-05-18 PROCEDURE — 80048 BASIC METABOLIC PNL TOTAL CA: CPT

## 2023-05-18 PROCEDURE — 83970 ASSAY OF PARATHORMONE: CPT

## 2023-05-18 PROCEDURE — 83735 ASSAY OF MAGNESIUM: CPT

## 2023-05-18 PROCEDURE — 36415 COLL VENOUS BLD VENIPUNCTURE: CPT

## 2023-05-31 ENCOUNTER — HOSPITAL ENCOUNTER (OUTPATIENT)
Dept: PHYSICAL THERAPY | Age: 68
Setting detail: THERAPIES SERIES
Discharge: HOME | End: 2023-05-31
Payer: MEDICARE

## 2023-05-31 PROCEDURE — 97162 PT EVAL MOD COMPLEX 30 MIN: CPT

## 2023-05-31 PROCEDURE — 97110 THERAPEUTIC EXERCISES: CPT

## 2023-05-31 NOTE — PROGRESS NOTES
** PLEASE SIGN, DATE AND TIME CERTIFICATION BELOW AND RETURN TO [facility] OUTPATIENT REHABILITATION (FAX #: [phone]). ATTEST/CO-SIGN IF ACCESSING VIA [employer]. THANK YOU.**    I certify that I have examined the patient below and determined that Physical Medicine and Rehabilitation service is necessary and that I approve the established plan of care for up to 90 days or as specifically noted. Attestation, signature or co-signature of physician indicates approval of certification requirements.    ________________________ ____________ __________  Physician Signature   Date   Time   [address] [facility]  PHYSICAL THERAPY  [x] EVALUATION  [] DAILY NOTE (LAND) [] DAILY NOTE (AQUATIC ) [] PROGRESS NOTE [] DISCHARGE NOTE    [] [facility]   [x] [facility] 90    [] [facility]   [] Cha Cruzey    Date: 2023  Patient Name:  Kwabena Moralez  : 1955  MRN: [id number]  CSN: [id number]    Referring Practitioner Damaso Arzola MD   Diagnosis Other spondylosis, cervical region [M47.892]  Radiculopathy, cervical region [M54.12]    Treatment Diagnosis Neck pain, decreased AROM neck, numbness L UE hand   Date of Evaluation 23   Additional Pertinent History HTN, DM, obesity, cervical fusion 5-7 23      Functional Outcome Measure Used Neck disability scale   Functional Outcome Score Eval 24 (23)       Insurance: Primary: Payor: MEDICARE /  /  / ,   Secondary: [employer]   Authorization Information: INSURANCE PAYS AT:   80%              PATIENT RESPONSIBILITY AND/OR CO-PAY:  20%  SECONDARY INSURANCE COMPANY: [employer]. PRECERTIFICATION REQUIRED:  No  INSURANCE THERAPY BENEFIT:  Patient has unlimited visits based on medical necessity  Benefit will not cover maintenance or preventative treatment.   AQUATIC THERAPY COVERED:   Yes  MODALITIES COVERED:  Yes, with the exception of iontophoresis and hot packs/cold

## 2023-06-02 ENCOUNTER — HOSPITAL ENCOUNTER (OUTPATIENT)
Dept: PHYSICAL THERAPY | Age: 68
Setting detail: THERAPIES SERIES
Discharge: HOME | End: 2023-06-02
Payer: MEDICARE

## 2023-06-02 PROCEDURE — 97110 THERAPEUTIC EXERCISES: CPT

## 2023-06-02 NOTE — PROGRESS NOTES
7115 [facility]  PHYSICAL THERAPY  [] EVALUATION  [x] DAILY NOTE (LAND) [] DAILY NOTE (AQUATIC ) [] PROGRESS NOTE [] DISCHARGE NOTE    [] 615 [facility]   [x] [place] 90    [] [facility]   [] Kaleb Schafer    Date: 2023  Patient Name:  Harry Gilmore  : 1955  MRN: [id number]  CSN: [id number]    Referring Practitioner Sushma Mitchell MD   Diagnosis Other spondylosis, cervical region [M47.892]  Radiculopathy, cervical region [M54.12]    Treatment Diagnosis Neck pain, decreased AROM neck, numbness L UE hand   Date of Evaluation 23   Additional Pertinent History HTN, DM, obesity, cervical fusion 5-23      Functional Outcome Measure Used Neck disability scale   Functional Outcome Score Eval 24 (23)       Insurance: Primary: Payor: MEDICARE /  /  / ,   Secondary: [employer]   Authorization Information: INSURANCE PAYS AT:   80%              PATIENT RESPONSIBILITY AND/OR CO-PAY:  20%  SECONDARY INSURANCE COMPANY: [employer]. PRECERTIFICATION REQUIRED:  No  INSURANCE THERAPY BENEFIT:  Patient has unlimited visits based on medical necessity  Benefit will not cover maintenance or preventative treatment. AQUATIC THERAPY COVERED:   Yes  MODALITIES COVERED:  Yes, with the exception of iontophoresis and hot packs/cold packs  TELEHEALTH COVERED: Yes   Visit # 2, 2/10 for progress note   Visits Allowed: unlimited   Recertification Date:    Physician Follow-Up: - f/u with family MD , Dr. Teodora Garcia- to discuss ? Checking blood sugar, f/u with Dr. Jj Kulkarni for cervical fusion 23,    Physician Orders:    History of Present Illness: 22 had L UE parathyroid and tumor, woke up with L UE numbness from neck to hand. Had EMG by Dr. Abdi Pena on 10/20/22 and dx with L carpal tunnel and L C5-6 cervical radiculopathy .   Patient had L Carpal tunnel surgery by Dr. Nneka Alfred on 11/15/22 but did not have any relief in

## 2023-06-07 ENCOUNTER — HOSPITAL ENCOUNTER (OUTPATIENT)
Dept: PHYSICAL THERAPY | Age: 68
Setting detail: THERAPIES SERIES
Discharge: HOME | End: 2023-06-07
Payer: MEDICARE

## 2023-06-07 PROCEDURE — 97110 THERAPEUTIC EXERCISES: CPT

## 2023-06-07 NOTE — PROGRESS NOTES
initiated. Plan to see patient 2 times per week for 6 weeks to address the treatment planned outlined above.   [x]  Continue with current plan of care  []  Modify plan of care as follows:    []  Hold pending physician visit  []  Discharge    Time In 0901   Time Out 0926   Timed Code Minutes: 25 min   Total Treatment Time: 25 min       Electronically Signed by: Mya Nagel PTA

## 2023-06-09 ENCOUNTER — HOSPITAL ENCOUNTER (OUTPATIENT)
Dept: PHYSICAL THERAPY | Age: 68
Setting detail: THERAPIES SERIES
Discharge: HOME | End: 2023-06-09
Payer: MEDICARE

## 2023-06-09 PROCEDURE — 97110 THERAPEUTIC EXERCISES: CPT

## 2023-06-09 NOTE — PROGRESS NOTES
[address] [facility]  PHYSICAL THERAPY  [] EVALUATION  [x] DAILY NOTE (LAND) [] DAILY NOTE (AQUATIC ) [] PROGRESS NOTE [] DISCHARGE NOTE    [] [address] [facility]   [x] [place] 90    [] [facility]   [] Deirdre Chiang    Date: 2023  Patient Name:  Tran Handy  : 1955  MRN: [id number]  CSN: [id number]    Referring Practitioner Chuckie Arriaga MD   Diagnosis Other spondylosis, cervical region [M47.892]  Radiculopathy, cervical region [M54.12]    Treatment Diagnosis Neck pain, decreased AROM neck, numbness L UE hand   Date of Evaluation 23   Additional Pertinent History HTN, DM, obesity, cervical fusion 5-23      Functional Outcome Measure Used Neck disability scale   Functional Outcome Score Eval 24 (23)       Insurance: Primary: Payor: MEDICARE /  /  / ,   Secondary: [employer]   Authorization Information: INSURANCE PAYS AT:   80%              PATIENT RESPONSIBILITY AND/OR CO-PAY:  20%  SECONDARY INSURANCE COMPANY: [employer]. PRECERTIFICATION REQUIRED:  No  INSURANCE THERAPY BENEFIT:  Patient has unlimited visits based on medical necessity  Benefit will not cover maintenance or preventative treatment. AQUATIC THERAPY COVERED:   Yes  MODALITIES COVERED:  Yes, with the exception of iontophoresis and hot packs/cold packs  TELEHEALTH COVERED: Yes   Visit # 4, 4/10 for progress note   Visits Allowed: unlimited   Recertification Date:    Physician Follow-Up: - f/u with family MD , Dr. Driss Davis- to discuss ? Checking blood sugar, f/u with Dr. Florencia Goodman for cervical fusion 23,    Physician Orders:    History of Present Illness: 22 had L UE parathyroid and tumor, woke up with L UE numbness from neck to hand. Had EMG by Dr. Samreen Alvares on 10/20/22 and dx with L carpal tunnel and L C5-6 cervical radiculopathy .   Patient had L Carpal tunnel surgery by Dr. Jacoby Pierre on 11/15/22 but did not have any relief in

## 2023-06-19 ENCOUNTER — HOSPITAL ENCOUNTER (OUTPATIENT)
Dept: PHYSICAL THERAPY | Age: 68
Setting detail: THERAPIES SERIES
Discharge: HOME | End: 2023-06-19
Payer: MEDICARE

## 2023-06-19 PROCEDURE — 97110 THERAPEUTIC EXERCISES: CPT

## 2023-06-19 NOTE — PROGRESS NOTES
[facility] [facility]  PHYSICAL THERAPY  [] EVALUATION  [x] DAILY NOTE (LAND) [] DAILY NOTE (AQUATIC ) [] PROGRESS NOTE [] DISCHARGE NOTE    [] [facility]   [x] Jason 90    [] [facility]   [] Yumiko Venessa    Date: 2023  Patient Name:  Gerarda Moritz  : 1955  MRN: [id number]  CSN: [id number]    Referring Practitioner Sina Anders MD   Diagnosis Other spondylosis, cervical region [M47.892]  Radiculopathy, cervical region [M54.12]    Treatment Diagnosis Neck pain, decreased AROM neck, numbness L UE hand   Date of Evaluation 23   Additional Pertinent History HTN, DM, obesity, cervical fusion 5-23      Functional Outcome Measure Used Neck disability scale   Functional Outcome Score Eval 24 (23)       Insurance: Primary: Payor: MEDICARE /  /  / ,   Secondary: [employer]   Authorization Information: INSURANCE PAYS AT:   80%              PATIENT RESPONSIBILITY AND/OR CO-PAY:  20%  SECONDARY INSURANCE COMPANY: [employer] PRECERTIFICATION REQUIRED:  No  INSURANCE THERAPY BENEFIT:  Patient has unlimited visits based on medical necessity  Benefit will not cover maintenance or preventative treatment. AQUATIC THERAPY COVERED:   Yes  MODALITIES COVERED:  Yes, with the exception of iontophoresis and hot packs/cold packs  TELEHEALTH COVERED: Yes   Visit # 7, 7/10 for progress note   Visits Allowed: unlimited   Recertification Date:    Physician Follow-Up: - f/u with family MD , Dr. Feliciano Alvarado- to discuss ? Checking blood sugar, f/u with Dr. Salome Muñoz for cervical fusion 23,    Physician Orders:    History of Present Illness: 22 had L UE parathyroid and tumor, woke up with L UE numbness from neck to hand. Had EMG by Dr. Pam Burden on 10/20/22 and dx with L carpal tunnel and L C5-6 cervical radiculopathy .   Patient had L Carpal tunnel surgery by Dr. Alisa York on 11/15/22 but did not have any relief in

## 2023-06-20 ENCOUNTER — HOSPITAL ENCOUNTER (OUTPATIENT)
Age: 68
Discharge: HOME | End: 2023-06-20
Payer: MEDICARE

## 2023-06-20 LAB
ANION GAP SERPL CALC-SCNC: 14 MEQ/L (ref 8–16)
BUN SERPL-MCNC: 34 MG/DL (ref 7–22)
CALCIUM SERPL-MCNC: 10.6 MG/DL (ref 8.5–10.5)
CHLORIDE SERPL-SCNC: 102 MEQ/L (ref 98–111)
CO2 SERPL-SCNC: 23 MEQ/L (ref 23–33)
CREAT SERPL-MCNC: 1.5 MG/DL (ref 0.4–1.2)
DEPRECATED MEAN GLUCOSE BLD GHB EST-ACNC: 144 MG/DL (ref 70–126)
GFR SERPL CREATININE-BSD FRML MDRD: 51 ML/MIN/1.73M2
GLUCOSE SERPL-MCNC: 101 MG/DL (ref 70–108)
HBA1C MFR BLD HPLC: 6.8 % (ref 4.4–6.4)
POTASSIUM SERPL-SCNC: 4 MEQ/L (ref 3.5–5.2)
SODIUM SERPL-SCNC: 139 MEQ/L (ref 135–145)

## 2023-06-20 PROCEDURE — 36415 COLL VENOUS BLD VENIPUNCTURE: CPT

## 2023-06-20 PROCEDURE — 80048 BASIC METABOLIC PNL TOTAL CA: CPT

## 2023-06-20 PROCEDURE — 83036 HEMOGLOBIN GLYCOSYLATED A1C: CPT

## 2023-06-21 ENCOUNTER — HOSPITAL ENCOUNTER (OUTPATIENT)
Dept: PHYSICAL THERAPY | Age: 68
Setting detail: THERAPIES SERIES
Discharge: HOME | End: 2023-06-21
Payer: MEDICARE

## 2023-06-21 PROCEDURE — 97110 THERAPEUTIC EXERCISES: CPT

## 2023-06-21 NOTE — PROGRESS NOTES
[address] [facility]  PHYSICAL THERAPY  [] EVALUATION  [] DAILY NOTE (LAND) [] DAILY NOTE (AQUATIC ) [x] PROGRESS NOTE [] DISCHARGE NOTE    [] [address] [facility]   [x] Jason 90    [] [facility]   [] [facility]    Date: 2023  Patient Name:  Miriam Lowry  : 1955  MRN: [id number]  CSN: [id number]    Referring Practitioner Kei Reyes MD   Diagnosis Other spondylosis, cervical region [M47.892]  Radiculopathy, cervical region [M54.12]    Treatment Diagnosis Neck pain, decreased AROM neck, numbness L UE hand   Date of Evaluation 23   Additional Pertinent History HTN, DM, obesity, cervical fusion 5-23      Functional Outcome Measure Used Neck disability scale   Functional Outcome Score Eval 24 (23)       Insurance: Primary: Payor: MEDICARE /  /  / ,   Secondary: XI[employer]   Authorization Information: INSURANCE PAYS AT:   80%              PATIENT RESPONSIBILITY AND/OR CO-PAY:  20%  SECONDARY INSURANCE COMPANY: [employer]. PRECERTIFICATION REQUIRED:  No  INSURANCE THERAPY BENEFIT:  Patient has unlimited visits based on medical necessity  Benefit will not cover maintenance or preventative treatment. AQUATIC THERAPY COVERED:   Yes  MODALITIES COVERED:  Yes, with the exception of iontophoresis and hot packs/cold packs  TELEHEALTH COVERED: Yes   Visit # 8, 10 for progress note   Visits Allowed: unlimited   Recertification Date:    Physician Follow-Up: - f/u with family MD , Dr. Viola Anne- to discuss ? Checking blood sugar, f/u with Dr. Makenna Sunshine for cervical fusion 23,    Physician Orders:    History of Present Illness: 22 had L UE parathyroid and tumor, woke up with L UE numbness from neck to hand. Had EMG by Dr. Tapan Freeman on 10/20/22 and dx with L carpal tunnel and L C5-6 cervical radiculopathy .   Patient had L Carpal tunnel surgery by Dr. Itzel Contreras on 11/15/22 but did not have any relief in

## 2023-06-28 ENCOUNTER — HOSPITAL ENCOUNTER (OUTPATIENT)
Dept: PHYSICAL THERAPY | Age: 68
Setting detail: THERAPIES SERIES
Discharge: HOME | End: 2023-06-28
Payer: MEDICARE

## 2023-06-28 PROCEDURE — 97110 THERAPEUTIC EXERCISES: CPT

## 2023-06-30 ENCOUNTER — HOSPITAL ENCOUNTER (OUTPATIENT)
Dept: PHYSICAL THERAPY | Age: 68
Setting detail: THERAPIES SERIES
Discharge: HOME | End: 2023-06-30
Payer: MEDICARE

## 2023-06-30 PROCEDURE — 97110 THERAPEUTIC EXERCISES: CPT

## 2023-07-03 ENCOUNTER — HOSPITAL ENCOUNTER (OUTPATIENT)
Dept: PHYSICAL THERAPY | Age: 68
Setting detail: THERAPIES SERIES
Discharge: HOME | End: 2023-07-03
Payer: MEDICARE

## 2023-07-03 PROCEDURE — 97110 THERAPEUTIC EXERCISES: CPT

## 2023-07-03 NOTE — PROGRESS NOTES
[address] [facility]  PHYSICAL THERAPY  [] EVALUATION  [x] DAILY NOTE (LAND) [] DAILY NOTE (AQUATIC ) [] PROGRESS NOTE [] DISCHARGE NOTE    [] [address] [facility] [place]   [x] [facility]    [] [facility]   [] Joey Moritz    Date: 7/3/2023  Patient Name:  Jenifer Sherwood  : 1955  MRN: [id number]  CSN: [id number]    Referring Practitioner Emmanuel Angelo MD   Diagnosis Other spondylosis, cervical region [M47.892]  Radiculopathy, cervical region [M54.12]    Treatment Diagnosis Neck pain, decreased AROM neck, numbness L UE hand   Date of Evaluation 23   Additional Pertinent History HTN, DM, obesity, cervical fusion 5-23      Functional Outcome Measure Used Neck disability scale   Functional Outcome Score Eval 24 (23)       Insurance: Primary: Payor: MEDICARE /  /  / ,   Secondary: [employer]   Authorization Information: INSURANCE PAYS AT:   80%              PATIENT RESPONSIBILITY AND/OR CO-PAY:  20%  SECONDARY INSURANCE COMPANY: [employer]. PRECERTIFICATION REQUIRED:  No  INSURANCE THERAPY BENEFIT:  Patient has unlimited visits based on medical necessity  Benefit will not cover maintenance or preventative treatment. AQUATIC THERAPY COVERED:   Yes  MODALITIES COVERED:  Yes, with the exception of iontophoresis and hot packs/cold packs  TELEHEALTH COVERED: Yes   Visit # 11, 3/10 for progress note   Visits Allowed: unlimited   Recertification Date:    Physician Follow-Up: - f/u with family MD , Dr. Leonora Haq- to discuss ? Checking blood sugar, f/u with Dr. Jeronimo Mercer for cervical fusion 23,    Physician Orders:    History of Present Illness: 22 had L UE parathyroid and tumor, woke up with L UE numbness from neck to hand. Had EMG by Dr. Beto Whitmore on 10/20/22 and dx with L carpal tunnel and L C5-6 cervical radiculopathy .   Patient had L Carpal tunnel surgery by Dr. Dania Gregg on 11/15/22 but did not have any relief in

## 2023-07-07 ENCOUNTER — HOSPITAL ENCOUNTER (OUTPATIENT)
Dept: PHYSICAL THERAPY | Age: 68
Setting detail: THERAPIES SERIES
Discharge: HOME | End: 2023-07-07
Payer: MEDICARE

## 2023-07-07 PROCEDURE — 97110 THERAPEUTIC EXERCISES: CPT

## 2023-07-07 NOTE — DISCHARGE SUMMARY
Exercise Program, and Patient Education    []  Plan to see patient 2 times per week for 3 weeks to address the treatment planned outlined above.   []  Continue with current plan of care  []  Modify plan of care as follows:    []  Hold pending physician visit  [x]  Discharge    Time In 0830   Time Out 0902   Timed Code Minutes: 32 min   Total Treatment Time: 32 min       Electronically Signed by: Dayne Spangler PT

## 2023-08-21 ENCOUNTER — APPOINTMENT (OUTPATIENT)
Dept: CT IMAGING | Age: 68
End: 2023-08-21
Payer: MEDICARE

## 2023-08-21 ENCOUNTER — HOSPITAL ENCOUNTER (EMERGENCY)
Age: 68
Discharge: HOME | End: 2023-08-22
Attending: EMERGENCY MEDICINE
Payer: MEDICARE

## 2023-08-21 VITALS
RESPIRATION RATE: 10 BRPM | WEIGHT: 315 LBS | DIASTOLIC BLOOD PRESSURE: 81 MMHG | HEART RATE: 62 BPM | HEIGHT: 71 IN | OXYGEN SATURATION: 96 % | BODY MASS INDEX: 44.1 KG/M2 | TEMPERATURE: 98.2 F | SYSTOLIC BLOOD PRESSURE: 139 MMHG

## 2023-08-21 DIAGNOSIS — N12 PYELONEPHRITIS OF LEFT KIDNEY: Primary | ICD-10-CM

## 2023-08-21 LAB
ANION GAP SERPL CALC-SCNC: 15 MEQ/L (ref 8–16)
APTT PPP: 34.6 SECONDS (ref 22–38)
BACTERIA URNS QL MICRO: ABNORMAL /HPF
BILIRUB UR QL STRIP.AUTO: NEGATIVE
BUN SERPL-MCNC: 22 MG/DL (ref 7–22)
CALCIUM SERPL-MCNC: 10.6 MG/DL (ref 8.5–10.5)
CASTS #/AREA URNS LPF: ABNORMAL /LPF
CASTS 2: ABNORMAL /LPF
CHARACTER UR: CLEAR
CHLORIDE SERPL-SCNC: 96 MEQ/L (ref 98–111)
CO2 SERPL-SCNC: 24 MEQ/L (ref 23–33)
COLOR: YELLOW
CREAT SERPL-MCNC: 1.6 MG/DL (ref 0.4–1.2)
CRYSTALS URNS MICRO: ABNORMAL
EPITHELIAL CELLS, UA: ABNORMAL /HPF
FLUAV RNA RESP QL NAA+PROBE: NOT DETECTED
FLUBV RNA RESP QL NAA+PROBE: NOT DETECTED
GFR SERPL CREATININE-BSD FRML MDRD: 47 ML/MIN/1.73M2
GLUCOSE SERPL-MCNC: 127 MG/DL (ref 70–108)
GLUCOSE UR QL STRIP.AUTO: NEGATIVE MG/DL
HGB UR QL STRIP.AUTO: ABNORMAL
INR PPP: 1.05 (ref 0.85–1.13)
KETONES UR QL STRIP.AUTO: NEGATIVE
MISCELLANEOUS 2: ABNORMAL
NITRITE UR QL STRIP: NEGATIVE
OSMOLALITY SERPL CALC.SUM OF ELEC: 275 MOSMOL/KG (ref 275–300)
PH UR STRIP.AUTO: 5.5 [PH] (ref 5–9)
POTASSIUM SERPL-SCNC: 3.9 MEQ/L (ref 3.5–5.2)
PROT UR STRIP.AUTO-MCNC: 100 MG/DL
RBC URINE: ABNORMAL /HPF
RENAL EPI CELLS #/AREA URNS HPF: ABNORMAL /[HPF]
SARS-COV-2 RNA RESP QL NAA+PROBE: NOT DETECTED
SODIUM SERPL-SCNC: 135 MEQ/L (ref 135–145)
SP GR UR REFRACT.AUTO: 1.01 (ref 1–1.03)
UROBILINOGEN, URINE: 0.2 EU/DL (ref 0–1)
WBC #/AREA URNS HPF: ABNORMAL /HPF
WBC #/AREA URNS HPF: ABNORMAL /[HPF]
YEAST LIKE FUNGI URNS QL MICRO: ABNORMAL

## 2023-08-21 PROCEDURE — 81001 URINALYSIS AUTO W/SCOPE: CPT

## 2023-08-21 PROCEDURE — 87040 BLOOD CULTURE FOR BACTERIA: CPT

## 2023-08-21 PROCEDURE — 6370000000 HC RX 637 (ALT 250 FOR IP): Performed by: STUDENT IN AN ORGANIZED HEALTH CARE EDUCATION/TRAINING PROGRAM

## 2023-08-21 PROCEDURE — 85025 COMPLETE CBC W/AUTO DIFF WBC: CPT

## 2023-08-21 PROCEDURE — 85610 PROTHROMBIN TIME: CPT

## 2023-08-21 PROCEDURE — 93005 ELECTROCARDIOGRAM TRACING: CPT | Performed by: EMERGENCY MEDICINE

## 2023-08-21 PROCEDURE — 2580000003 HC RX 258: Performed by: STUDENT IN AN ORGANIZED HEALTH CARE EDUCATION/TRAINING PROGRAM

## 2023-08-21 PROCEDURE — 96365 THER/PROPH/DIAG IV INF INIT: CPT

## 2023-08-21 PROCEDURE — 74176 CT ABD & PELVIS W/O CONTRAST: CPT

## 2023-08-21 PROCEDURE — 87077 CULTURE AEROBIC IDENTIFY: CPT

## 2023-08-21 PROCEDURE — 87636 SARSCOV2 & INF A&B AMP PRB: CPT

## 2023-08-21 PROCEDURE — 85730 THROMBOPLASTIN TIME PARTIAL: CPT

## 2023-08-21 PROCEDURE — 87086 URINE CULTURE/COLONY COUNT: CPT

## 2023-08-21 PROCEDURE — 6360000002 HC RX W HCPCS: Performed by: STUDENT IN AN ORGANIZED HEALTH CARE EDUCATION/TRAINING PROGRAM

## 2023-08-21 PROCEDURE — 36415 COLL VENOUS BLD VENIPUNCTURE: CPT

## 2023-08-21 PROCEDURE — 80048 BASIC METABOLIC PNL TOTAL CA: CPT

## 2023-08-21 PROCEDURE — 99284 EMERGENCY DEPT VISIT MOD MDM: CPT

## 2023-08-21 PROCEDURE — 87186 SC STD MICRODIL/AGAR DIL: CPT

## 2023-08-21 RX ORDER — AMOXICILLIN AND CLAVULANATE POTASSIUM 875; 125 MG/1; MG/1
1 TABLET, FILM COATED ORAL 2 TIMES DAILY
Qty: 14 TABLET | Refills: 0 | Status: SHIPPED | OUTPATIENT
Start: 2023-08-21 | End: 2023-08-28

## 2023-08-21 RX ORDER — ACETAMINOPHEN 500 MG
1000 TABLET ORAL ONCE
Status: COMPLETED | OUTPATIENT
Start: 2023-08-21 | End: 2023-08-21

## 2023-08-21 RX ORDER — LIDOCAINE 4 G/G
1 PATCH TOPICAL ONCE
Status: DISCONTINUED | OUTPATIENT
Start: 2023-08-21 | End: 2023-08-22 | Stop reason: HOSPADM

## 2023-08-21 RX ADMIN — ACETAMINOPHEN 1000 MG: 500 TABLET ORAL at 20:58

## 2023-08-21 RX ADMIN — CEFTRIAXONE SODIUM 1000 MG: 1 INJECTION, POWDER, FOR SOLUTION INTRAMUSCULAR; INTRAVENOUS at 22:27

## 2023-08-21 ASSESSMENT — ENCOUNTER SYMPTOMS
EYES NEGATIVE: 1
GASTROINTESTINAL NEGATIVE: 1
ALLERGIC/IMMUNOLOGIC NEGATIVE: 1
RESPIRATORY NEGATIVE: 1

## 2023-08-21 ASSESSMENT — PAIN SCALES - GENERAL
PAINLEVEL_OUTOF10: 1
PAINLEVEL_OUTOF10: 2

## 2023-08-21 ASSESSMENT — PAIN - FUNCTIONAL ASSESSMENT: PAIN_FUNCTIONAL_ASSESSMENT: 0-10

## 2023-08-21 NOTE — ED TRIAGE NOTES
Pt arrives to ED from home for c/o hypertension, headache, and back pain. Pt states he passed a kidney stone last Friday and has not been feeling well since. Pt states symptoms are worse today.

## 2023-08-22 LAB
AUTO DIFF PNL BLD: ABNORMAL
BASOPHILS ABSOLUTE: 0.1 THOU/MM3 (ref 0–0.1)
BASOPHILS NFR BLD AUTO: 0.7 %
DEPRECATED RDW RBC AUTO: 41.1 FL (ref 35–45)
EOSINOPHIL NFR BLD AUTO: 1.1 %
EOSINOPHILS ABSOLUTE: 0.2 THOU/MM3 (ref 0–0.4)
ERYTHROCYTE [DISTWIDTH] IN BLOOD BY AUTOMATED COUNT: 13 % (ref 11.5–14.5)
HCT VFR BLD AUTO: 44 % (ref 42–52)
HGB BLD-MCNC: 14.5 GM/DL (ref 14–18)
IMM GRANULOCYTES # BLD AUTO: 0.41 THOU/MM3 (ref 0–0.07)
IMM GRANULOCYTES NFR BLD AUTO: 2.6 %
LYMPHOCYTES ABSOLUTE: 2.6 THOU/MM3 (ref 1–4.8)
LYMPHOCYTES NFR BLD AUTO: 16.2 %
MCH RBC QN AUTO: 28.9 PG (ref 26–33)
MCHC RBC AUTO-ENTMCNC: 33 GM/DL (ref 32.2–35.5)
MCV RBC AUTO: 87.8 FL (ref 80–94)
MONOCYTES ABSOLUTE: 1.9 THOU/MM3 (ref 0.4–1.3)
MONOCYTES NFR BLD AUTO: 12 %
NEUTROPHILS NFR BLD AUTO: 67.4 %
NRBC BLD AUTO-RTO: 0 /100 WBC
PATHOLOGIST REVIEW: ABNORMAL
PLATELET # BLD AUTO: 306 THOU/MM3 (ref 130–400)
PLATELET BLD QL SMEAR: ADEQUATE
PMV BLD AUTO: 9.9 FL (ref 9.4–12.4)
RBC # BLD AUTO: 5.01 MILL/MM3 (ref 4.7–6.1)
SCAN OF BLOOD SMEAR: NORMAL
SEGMENTED NEUTROPHILS ABSOLUTE COUNT: 10.6 THOU/MM3 (ref 1.8–7.7)
WBC # BLD AUTO: 15.8 THOU/MM3 (ref 4.8–10.8)

## 2023-08-22 NOTE — ED NOTES
Lab at bedside collecting cultures. Updated pt on plan of care. Voiced no needs. Call light in reach.      Ilir Davis RN  08/21/23 6312

## 2023-08-22 NOTE — ED NOTES
Pt continues to sit in bed with no s/s of distress noted. Updated on plan of care. Voiced no needs. Call light in reach.      Naz Layton RN  08/21/23 2811

## 2023-08-22 NOTE — ED NOTES
Pt in bed watching tv with no s/s of distress noted. Updated on plan of care. Voiced no needs. Call light in reach.      Amy Velazquez RN  08/21/23 8626

## 2023-08-22 NOTE — ED NOTES
Pt able to provide urine specimen at this time. Updated on plan of care. Voiced no needs. Call light in reach.      Binta Parker RN  08/21/23 2023

## 2023-08-22 NOTE — ED PROVIDER NOTES
[facility] ENCOUNTER          Pt Name: Miguel Francisco  MRN: [id number]  [address] 1955  Date of evaluation: 8/21/2023  Emergency Physician: Yuliya Finney MD    CHIEF COMPLAINT       Chief Complaint   Patient presents with    Hypertension    Headache    Back Pain     History obtained from the patient. HISTORY OF PRESENT ILLNESS    HPI  Miguel Francisco is a 78 y.o. male with past medical history of diabetes mellitus, CAD, CHF, CKD, NNAMDI, hypertension who presents to the emergency department for evaluation of left flank pain and headache. Patient reports that he believes he passed a kidney stone 3 days ago. He reports also having hematuria in which he passed several blood clots. No new hematuria since then. He reports subjective fevers. Also reports myalgias. Feels fatigued. No worsening cough or shortness of breath or chest pain or abdominal pain. Denies dysuria but describes urinary urgency. No nausea or vomiting. The patient has no other acute complaints at this time. REVIEW OF SYSTEMS   Review of Systems   Constitutional:  Positive for fatigue and fever. HENT: Negative. Eyes: Negative. Respiratory: Negative. Cardiovascular: Negative. Gastrointestinal: Negative. Genitourinary:  Positive for flank pain, hematuria and urgency. Negative for dysuria and testicular pain. Skin: Negative. Allergic/Immunologic: Negative. Neurological: Negative. Hematological: Negative. Psychiatric/Behavioral: Negative. See HPI.  12 point ROS performed, pertinent positives listed above otherwise negative  PAST MEDICAL AND SURGICAL HISTORY     Past Medical History:   Diagnosis Date    Arthritis     CAD (coronary artery disease)     CHF (congestive heart failure) (HCC)     Chronic kidney disease     Diabetes mellitus ([address])     Hyperlipidemia     Hypertension     Lower extremity edema     Membranous nephropathy of blood. [JT]   2147 CT ABDOMEN PELVIS WO CONTRAST Additional Contrast? None  CT reviewed does have bilateral perinephric stranding worse on the left. Given his systemic symptoms and left CVA tenderness in combination with a positive urinalysis there is concern for pyelonephritis. 1 g of Rocephin ordered here for him. At this point he does not meet sepsis criteria. [JT]   8117 This is a 64-year-old male who comes in due to left flank pain and systemic symptoms like myalgias and fatigue but no nausea or vomiting. Also reports passing a kidney stone and kimberley hematuria 3 days ago which resolved. Does have very mild left CVA tenderness to palpation. Urinalysis does show trace amount of blood and findings consistent with UTI. CT of the abdomen pelvis was obtained for kidney stone rule out and actually does not demonstrate any obstructive stones but does demonstrate bilateral perinephric stranding that is worse on the left. Presentation appears consistent with acute pyelonephritis. Does not meet sepsis criteria. He received 1 dose of Rocephin here and lidocaine patch and Tylenol for pain. Results of the work-up discussed with the patient. We will discharge him home on a course of Augmentin and follow-up to PCP. Strict return precautions discussed. He is discharged home in stable condition. [JT]      ED Course User Index  [JT] Scarlett Leroy MD     Available laboratory and imaging results were independently reviewed and clinically correlated. Decision Rules/Clinical Scores utilized:  Not Applicable. Code Status:  Not addressed during this ED visit    Social determinants of health impacting treatment or disposition:  Not Applicable. Medical Commodities impacting treatment or disposition:  Not Applicable.    Past Medical History:   Diagnosis Date    Arthritis     CAD (coronary artery disease)     CHF (congestive heart failure) (HCC)     Chronic kidney disease     Diabetes mellitus ([address])

## 2023-08-23 LAB
BACTERIA UR CULT: ABNORMAL
ORGANISM: ABNORMAL

## 2023-08-23 PROCEDURE — 93010 ELECTROCARDIOGRAM REPORT: CPT | Performed by: INTERNAL MEDICINE

## 2023-08-26 LAB
BACTERIA BLD AEROBE CULT: NORMAL
BACTERIA BLD AEROBE CULT: NORMAL
EKG ATRIAL RATE: 84 BPM
EKG P AXIS: -12 DEGREES
EKG P-R INTERVAL: 172 MS
EKG Q-T INTERVAL: 346 MS
EKG QRS DURATION: 98 MS
EKG QTC CALCULATION (BAZETT): 408 MS
EKG R AXIS: -56 DEGREES
EKG T AXIS: 3 DEGREES
EKG VENTRICULAR RATE: 84 BPM

## 2023-09-22 ENCOUNTER — HOSPITAL ENCOUNTER (OUTPATIENT)
Age: 68
Discharge: HOME | End: 2023-09-22
Payer: MEDICARE

## 2023-09-22 LAB
ANION GAP SERPL CALC-SCNC: 12 MEQ/L (ref 8–16)
BACTERIA URNS QL MICRO: ABNORMAL /HPF
BILIRUB UR QL STRIP.AUTO: NEGATIVE
BUN SERPL-MCNC: 36 MG/DL (ref 7–22)
CALCIUM SERPL-MCNC: 10.9 MG/DL (ref 8.5–10.5)
CASTS #/AREA URNS LPF: ABNORMAL /LPF
CASTS 2: ABNORMAL /LPF
CHARACTER UR: ABNORMAL
CHLORIDE SERPL-SCNC: 102 MEQ/L (ref 98–111)
CO2 SERPL-SCNC: 26 MEQ/L (ref 23–33)
COLOR: YELLOW
CREAT SERPL-MCNC: 1.7 MG/DL (ref 0.4–1.2)
CRYSTALS URNS MICRO: ABNORMAL
EPITHELIAL CELLS, UA: ABNORMAL /HPF
GFR SERPL CREATININE-BSD FRML MDRD: 44 ML/MIN/1.73M2
GLUCOSE SERPL-MCNC: 153 MG/DL (ref 70–108)
GLUCOSE UR QL STRIP.AUTO: NEGATIVE MG/DL
HGB UR QL STRIP.AUTO: ABNORMAL
IONIZED CALCIUM, WHOLE BLOOD: 1.38 MMOL/L (ref 1.12–1.32)
KETONES UR QL STRIP.AUTO: NEGATIVE
MISCELLANEOUS 2: ABNORMAL
NITRITE UR QL STRIP: POSITIVE
PH UR STRIP.AUTO: 6.5 [PH] (ref 5–9)
POTASSIUM SERPL-SCNC: 4 MEQ/L (ref 3.5–5.2)
PROT UR STRIP.AUTO-MCNC: 100 MG/DL
RBC URINE: ABNORMAL /HPF
RENAL EPI CELLS #/AREA URNS HPF: ABNORMAL /[HPF]
SODIUM SERPL-SCNC: 140 MEQ/L (ref 135–145)
SP GR UR REFRACT.AUTO: 1.01 (ref 1–1.03)
UROBILINOGEN, URINE: 0.2 EU/DL (ref 0–1)
WBC #/AREA URNS HPF: > 200 /HPF
WBC #/AREA URNS HPF: ABNORMAL /[HPF]
YEAST LIKE FUNGI URNS QL MICRO: ABNORMAL

## 2023-09-22 PROCEDURE — 87186 SC STD MICRODIL/AGAR DIL: CPT

## 2023-09-22 PROCEDURE — 81001 URINALYSIS AUTO W/SCOPE: CPT

## 2023-09-22 PROCEDURE — 82330 ASSAY OF CALCIUM: CPT

## 2023-09-22 PROCEDURE — 80048 BASIC METABOLIC PNL TOTAL CA: CPT

## 2023-09-22 PROCEDURE — 36415 COLL VENOUS BLD VENIPUNCTURE: CPT

## 2023-09-22 PROCEDURE — 87086 URINE CULTURE/COLONY COUNT: CPT

## 2023-09-22 PROCEDURE — 87077 CULTURE AEROBIC IDENTIFY: CPT

## 2023-09-24 LAB
BACTERIA UR CULT: ABNORMAL
ORGANISM: ABNORMAL

## 2023-10-07 ENCOUNTER — NURSE ONLY (OUTPATIENT)
Dept: LAB | Age: 68
End: 2023-10-07

## 2023-10-09 LAB
BACTERIA UR CULT: ABNORMAL
ORGANISM: ABNORMAL

## 2023-12-13 ENCOUNTER — HOSPITAL ENCOUNTER (OUTPATIENT)
Age: 68
Discharge: HOME | End: 2023-12-13
Payer: MEDICARE

## 2023-12-13 LAB
25(OH)D3 SERPL-MCNC: 50 NG/ML (ref 30–100)
ALBUMIN SERPL BCG-MCNC: 4.3 G/DL (ref 3.5–5.1)
ALP SERPL-CCNC: 105 U/L (ref 38–126)
ALT SERPL W/O P-5'-P-CCNC: 44 U/L (ref 11–66)
ANION GAP SERPL CALC-SCNC: 11 MEQ/L (ref 8–16)
AST SERPL-CCNC: 35 U/L (ref 5–40)
BACTERIA URNS QL MICRO: ABNORMAL /HPF
BASOPHILS ABSOLUTE: 0.1 THOU/MM3 (ref 0–0.1)
BASOPHILS NFR BLD AUTO: 1 %
BILIRUB SERPL-MCNC: 0.5 MG/DL (ref 0.3–1.2)
BILIRUB UR QL STRIP.AUTO: NEGATIVE
BUN SERPL-MCNC: 32 MG/DL (ref 7–22)
CALCIUM SERPL-MCNC: 10.7 MG/DL (ref 8.5–10.5)
CASTS #/AREA URNS LPF: ABNORMAL /LPF
CASTS 2: ABNORMAL /LPF
CHARACTER UR: CLEAR
CHLORIDE SERPL-SCNC: 101 MEQ/L (ref 98–111)
CHOLEST SERPL-MCNC: 169 MG/DL (ref 100–199)
CO2 SERPL-SCNC: 27 MEQ/L (ref 23–33)
COLOR: YELLOW
CREAT SERPL-MCNC: 1.6 MG/DL (ref 0.4–1.2)
CREAT UR-MCNC: 33.4 MG/DL
CREAT UR-MCNC: 33.4 MG/DL
CRYSTALS URNS MICRO: ABNORMAL
DEPRECATED MEAN GLUCOSE BLD GHB EST-ACNC: 138 MG/DL (ref 70–126)
DEPRECATED RDW RBC AUTO: 46.7 FL (ref 35–45)
EOSINOPHIL NFR BLD AUTO: 4.3 %
EOSINOPHILS ABSOLUTE: 0.4 THOU/MM3 (ref 0–0.4)
EPITHELIAL CELLS, UA: ABNORMAL /HPF
ERYTHROCYTE [DISTWIDTH] IN BLOOD BY AUTOMATED COUNT: 14.3 % (ref 11.5–14.5)
GFR SERPL CREATININE-BSD FRML MDRD: 47 ML/MIN/1.73M2
GLUCOSE SERPL-MCNC: 116 MG/DL (ref 70–108)
GLUCOSE UR QL STRIP.AUTO: NEGATIVE MG/DL
HBA1C MFR BLD HPLC: 6.6 % (ref 4.4–6.4)
HCT VFR BLD AUTO: 50 % (ref 42–52)
HDLC SERPL-MCNC: 37 MG/DL
HGB BLD-MCNC: 15.8 GM/DL (ref 14–18)
HGB UR QL STRIP.AUTO: NEGATIVE
IMM GRANULOCYTES # BLD AUTO: 0.09 THOU/MM3 (ref 0–0.07)
IMM GRANULOCYTES NFR BLD AUTO: 1 %
IONIZED CALCIUM, WHOLE BLOOD: 1.3 MMOL/L (ref 1.12–1.32)
KETONES UR QL STRIP.AUTO: NEGATIVE
LDLC SERPL CALC-MCNC: 103 MG/DL
LYMPHOCYTES ABSOLUTE: 2.3 THOU/MM3 (ref 1–4.8)
LYMPHOCYTES NFR BLD AUTO: 24.6 %
MAGNESIUM SERPL-MCNC: 2.2 MG/DL (ref 1.6–2.4)
MCH RBC QN AUTO: 28.3 PG (ref 26–33)
MCHC RBC AUTO-ENTMCNC: 31.6 GM/DL (ref 32.2–35.5)
MCV RBC AUTO: 89.6 FL (ref 80–94)
MICROALBUMIN UR-MCNC: 27.91 MG/DL
MICROALBUMIN/CREAT RATIO PNL UR: 836 MG/G (ref 0–30)
MISCELLANEOUS 2: ABNORMAL
MONOCYTES ABSOLUTE: 0.8 THOU/MM3 (ref 0.4–1.3)
MONOCYTES NFR BLD AUTO: 9.1 %
NEUTROPHILS NFR BLD AUTO: 60 %
NITRITE UR QL STRIP: NEGATIVE
NRBC BLD AUTO-RTO: 0 /100 WBC
PH UR STRIP.AUTO: 5.5 [PH] (ref 5–9)
PHOSPHATE SERPL-MCNC: 2.7 MG/DL (ref 2.4–4.7)
PLATELET # BLD AUTO: 264 THOU/MM3 (ref 130–400)
PMV BLD AUTO: 10.4 FL (ref 9.4–12.4)
POTASSIUM SERPL-SCNC: 4.1 MEQ/L (ref 3.5–5.2)
PROT SERPL-MCNC: 7.6 G/DL (ref 6.1–8)
PROT UR STRIP.AUTO-MCNC: 30 MG/DL
PROT UR-MCNC: 37.9 MG/DL
PROT/CREAT 24H UR: 1.13 MG/G{CREAT}
PTH-INTACT SERPL-MCNC: 81.4 PG/ML (ref 15–65)
RBC # BLD AUTO: 5.58 MILL/MM3 (ref 4.7–6.1)
RBC URINE: ABNORMAL /HPF
RENAL EPI CELLS #/AREA URNS HPF: ABNORMAL /[HPF]
SEGMENTED NEUTROPHILS ABSOLUTE COUNT: 5.6 THOU/MM3 (ref 1.8–7.7)
SODIUM SERPL-SCNC: 139 MEQ/L (ref 135–145)
SP GR UR REFRACT.AUTO: 1.01 (ref 1–1.03)
TRIGL SERPL-MCNC: 145 MG/DL (ref 0–199)
URATE SERPL-MCNC: 8.8 MG/DL (ref 3.7–7)
UROBILINOGEN, URINE: 0.2 EU/DL (ref 0–1)
WBC # BLD AUTO: 9.3 THOU/MM3 (ref 4.8–10.8)
WBC #/AREA URNS HPF: ABNORMAL /HPF
WBC #/AREA URNS HPF: NEGATIVE /[HPF]
YEAST LIKE FUNGI URNS QL MICRO: ABNORMAL

## 2023-12-13 PROCEDURE — 82570 ASSAY OF URINE CREATININE: CPT

## 2023-12-13 PROCEDURE — 85025 COMPLETE CBC W/AUTO DIFF WBC: CPT

## 2023-12-13 PROCEDURE — 82330 ASSAY OF CALCIUM: CPT

## 2023-12-13 PROCEDURE — 83735 ASSAY OF MAGNESIUM: CPT

## 2023-12-13 PROCEDURE — 84156 ASSAY OF PROTEIN URINE: CPT

## 2023-12-13 PROCEDURE — 84550 ASSAY OF BLOOD/URIC ACID: CPT

## 2023-12-13 PROCEDURE — 83036 HEMOGLOBIN GLYCOSYLATED A1C: CPT

## 2023-12-13 PROCEDURE — 80061 LIPID PANEL: CPT

## 2023-12-13 PROCEDURE — 84100 ASSAY OF PHOSPHORUS: CPT

## 2023-12-13 PROCEDURE — 83970 ASSAY OF PARATHORMONE: CPT

## 2023-12-13 PROCEDURE — 36415 COLL VENOUS BLD VENIPUNCTURE: CPT

## 2023-12-13 PROCEDURE — 81001 URINALYSIS AUTO W/SCOPE: CPT

## 2023-12-13 PROCEDURE — 80053 COMPREHEN METABOLIC PANEL: CPT

## 2023-12-13 PROCEDURE — 82043 UR ALBUMIN QUANTITATIVE: CPT

## 2023-12-13 PROCEDURE — 82306 VITAMIN D 25 HYDROXY: CPT

## 2024-06-10 ENCOUNTER — HOSPITAL ENCOUNTER (INPATIENT)
Age: 69
LOS: 3 days | Discharge: HOME | End: 2024-06-14
Attending: EMERGENCY MEDICINE | Admitting: PHYSICIAN ASSISTANT
Payer: MEDICARE

## 2024-06-10 DIAGNOSIS — I50.9 CONGESTIVE HEART FAILURE, UNSPECIFIED HF CHRONICITY, UNSPECIFIED HEART FAILURE TYPE (HCC): ICD-10-CM

## 2024-06-10 DIAGNOSIS — R51.9 NONINTRACTABLE HEADACHE, UNSPECIFIED CHRONICITY PATTERN, UNSPECIFIED HEADACHE TYPE: ICD-10-CM

## 2024-06-10 DIAGNOSIS — N17.9 AKI (ACUTE KIDNEY INJURY) (HCC): Primary | ICD-10-CM

## 2024-06-10 DIAGNOSIS — R52 BODY ACHES: ICD-10-CM

## 2024-06-10 LAB
ALBUMIN SERPL BCG-MCNC: 3.9 G/DL (ref 3.5–5.1)
ALP SERPL-CCNC: 90 U/L (ref 38–126)
ALT SERPL W/O P-5'-P-CCNC: 22 U/L (ref 11–66)
ANION GAP SERPL CALC-SCNC: 11 MEQ/L (ref 8–16)
AST SERPL-CCNC: 17 U/L (ref 5–40)
BACTERIA URNS QL MICRO: ABNORMAL /HPF
BASOPHILS ABSOLUTE: 0 THOU/MM3 (ref 0–0.1)
BASOPHILS NFR BLD AUTO: 0.2 %
BILIRUB SERPL-MCNC: 0.8 MG/DL (ref 0.3–1.2)
BILIRUB UR QL STRIP.AUTO: NEGATIVE
BUN SERPL-MCNC: 32 MG/DL (ref 7–22)
CALCIUM SERPL-MCNC: 10 MG/DL (ref 8.5–10.5)
CASTS #/AREA URNS LPF: ABNORMAL /LPF
CASTS 2: ABNORMAL /LPF
CHARACTER UR: CLEAR
CHLORIDE SERPL-SCNC: 94 MEQ/L (ref 98–111)
CO2 SERPL-SCNC: 24 MEQ/L (ref 23–33)
COLOR: YELLOW
CREAT SERPL-MCNC: 2.7 MG/DL (ref 0.4–1.2)
CRYSTALS URNS MICRO: ABNORMAL
DEPRECATED RDW RBC AUTO: 41.8 FL (ref 35–45)
EOSINOPHIL NFR BLD AUTO: 0.2 %
EOSINOPHILS ABSOLUTE: 0 THOU/MM3 (ref 0–0.4)
EPITHELIAL CELLS, UA: ABNORMAL /HPF
ERYTHROCYTE [DISTWIDTH] IN BLOOD BY AUTOMATED COUNT: 13.2 % (ref 11.5–14.5)
FLUAV RNA RESP QL NAA+PROBE: NOT DETECTED
FLUBV RNA RESP QL NAA+PROBE: NOT DETECTED
GFR SERPL CREATININE-BSD FRML MDRD: 25 ML/MIN/1.73M2
GLUCOSE SERPL-MCNC: 217 MG/DL (ref 70–108)
GLUCOSE UR QL STRIP.AUTO: NEGATIVE MG/DL
HCT VFR BLD AUTO: 42.3 % (ref 42–52)
HGB BLD-MCNC: 14 GM/DL (ref 14–18)
HGB UR QL STRIP.AUTO: ABNORMAL
IMM GRANULOCYTES # BLD AUTO: 0.08 THOU/MM3 (ref 0–0.07)
IMM GRANULOCYTES NFR BLD AUTO: 0.7 %
KETONES UR QL STRIP.AUTO: NEGATIVE
LYMPHOCYTES ABSOLUTE: 0.6 THOU/MM3 (ref 1–4.8)
LYMPHOCYTES NFR BLD AUTO: 4.7 %
MCH RBC QN AUTO: 28.6 PG (ref 26–33)
MCHC RBC AUTO-ENTMCNC: 33.1 GM/DL (ref 32.2–35.5)
MCV RBC AUTO: 86.5 FL (ref 80–94)
MISCELLANEOUS 2: ABNORMAL
MONOCYTES ABSOLUTE: 1.4 THOU/MM3 (ref 0.4–1.3)
MONOCYTES NFR BLD AUTO: 11.5 %
NEUTROPHILS ABSOLUTE: 10.2 THOU/MM3 (ref 1.8–7.7)
NEUTROPHILS NFR BLD AUTO: 82.7 %
NITRITE UR QL STRIP: NEGATIVE
NRBC BLD AUTO-RTO: 0 /100 WBC
OSMOLALITY SERPL CALC.SUM OF ELEC: 272.4 MOSMOL/KG (ref 275–300)
PH UR STRIP.AUTO: 7 [PH] (ref 5–9)
PLATELET # BLD AUTO: 163 THOU/MM3 (ref 130–400)
PMV BLD AUTO: 10 FL (ref 9.4–12.4)
POTASSIUM SERPL-SCNC: 4.3 MEQ/L (ref 3.5–5.2)
PROT SERPL-MCNC: 7.1 G/DL (ref 6.1–8)
PROT UR STRIP.AUTO-MCNC: 100 MG/DL
RBC # BLD AUTO: 4.89 MILL/MM3 (ref 4.7–6.1)
RBC URINE: ABNORMAL /HPF
RENAL EPI CELLS #/AREA URNS HPF: ABNORMAL /[HPF]
SARS-COV-2 RNA RESP QL NAA+PROBE: NOT DETECTED
SODIUM SERPL-SCNC: 129 MEQ/L (ref 135–145)
SP GR UR REFRACT.AUTO: 1.01 (ref 1–1.03)
UROBILINOGEN, URINE: 0.2 EU/DL (ref 0–1)
WBC # BLD AUTO: 12.3 THOU/MM3 (ref 4.8–10.8)
WBC #/AREA URNS HPF: ABNORMAL /HPF
WBC #/AREA URNS HPF: ABNORMAL /[HPF]
YEAST LIKE FUNGI URNS QL MICRO: ABNORMAL

## 2024-06-10 PROCEDURE — 85025 COMPLETE CBC W/AUTO DIFF WBC: CPT

## 2024-06-10 PROCEDURE — 99223 1ST HOSP IP/OBS HIGH 75: CPT | Performed by: PHYSICIAN ASSISTANT

## 2024-06-10 PROCEDURE — 96374 THER/PROPH/DIAG INJ IV PUSH: CPT

## 2024-06-10 PROCEDURE — 36415 COLL VENOUS BLD VENIPUNCTURE: CPT

## 2024-06-10 PROCEDURE — 99285 EMERGENCY DEPT VISIT HI MDM: CPT

## 2024-06-10 PROCEDURE — 87636 SARSCOV2 & INF A&B AMP PRB: CPT

## 2024-06-10 PROCEDURE — 96375 TX/PRO/DX INJ NEW DRUG ADDON: CPT

## 2024-06-10 PROCEDURE — 6370000000 HC RX 637 (ALT 250 FOR IP): Performed by: STUDENT IN AN ORGANIZED HEALTH CARE EDUCATION/TRAINING PROGRAM

## 2024-06-10 PROCEDURE — 80053 COMPREHEN METABOLIC PANEL: CPT

## 2024-06-10 PROCEDURE — 2580000003 HC RX 258: Performed by: STUDENT IN AN ORGANIZED HEALTH CARE EDUCATION/TRAINING PROGRAM

## 2024-06-10 PROCEDURE — 81001 URINALYSIS AUTO W/SCOPE: CPT

## 2024-06-10 PROCEDURE — 6360000002 HC RX W HCPCS: Performed by: STUDENT IN AN ORGANIZED HEALTH CARE EDUCATION/TRAINING PROGRAM

## 2024-06-10 RX ORDER — ACETAMINOPHEN 500 MG
1000 TABLET ORAL ONCE
Status: COMPLETED | OUTPATIENT
Start: 2024-06-10 | End: 2024-06-10

## 2024-06-10 RX ORDER — 0.9 % SODIUM CHLORIDE 0.9 %
1000 INTRAVENOUS SOLUTION INTRAVENOUS ONCE
Status: COMPLETED | OUTPATIENT
Start: 2024-06-10 | End: 2024-06-10

## 2024-06-10 RX ORDER — PROCHLORPERAZINE EDISYLATE 5 MG/ML
10 INJECTION INTRAMUSCULAR; INTRAVENOUS ONCE
Status: COMPLETED | OUTPATIENT
Start: 2024-06-10 | End: 2024-06-10

## 2024-06-10 RX ORDER — DIPHENHYDRAMINE HYDROCHLORIDE 50 MG/ML
25 INJECTION INTRAMUSCULAR; INTRAVENOUS ONCE
Status: COMPLETED | OUTPATIENT
Start: 2024-06-10 | End: 2024-06-10

## 2024-06-10 RX ADMIN — PROCHLORPERAZINE EDISYLATE 10 MG: 5 INJECTION INTRAMUSCULAR; INTRAVENOUS at 21:54

## 2024-06-10 RX ADMIN — SODIUM CHLORIDE 1000 ML: 9 INJECTION, SOLUTION INTRAVENOUS at 21:52

## 2024-06-10 RX ADMIN — ACETAMINOPHEN 1000 MG: 500 TABLET ORAL at 21:52

## 2024-06-10 RX ADMIN — DIPHENHYDRAMINE HYDROCHLORIDE 25 MG: 50 INJECTION INTRAMUSCULAR; INTRAVENOUS at 21:54

## 2024-06-10 ASSESSMENT — PAIN SCALES - GENERAL: PAINLEVEL_OUTOF10: 7

## 2024-06-10 ASSESSMENT — PAIN - FUNCTIONAL ASSESSMENT: PAIN_FUNCTIONAL_ASSESSMENT: 0-10

## 2024-06-11 ENCOUNTER — APPOINTMENT (OUTPATIENT)
Dept: ULTRASOUND IMAGING | Age: 69
End: 2024-06-11
Payer: MEDICARE

## 2024-06-11 PROBLEM — N17.9 AKI (ACUTE KIDNEY INJURY) (HCC): Status: ACTIVE | Noted: 2024-06-11

## 2024-06-11 LAB
ANION GAP SERPL CALC-SCNC: 13 MEQ/L (ref 8–16)
BUN SERPL-MCNC: 33 MG/DL (ref 7–22)
CALCIUM SERPL-MCNC: 10.2 MG/DL (ref 8.5–10.5)
CHLORIDE SERPL-SCNC: 96 MEQ/L (ref 98–111)
CK SERPL-CCNC: 74 U/L (ref 55–170)
CO2 SERPL-SCNC: 21 MEQ/L (ref 23–33)
CREAT SERPL-MCNC: 2.5 MG/DL (ref 0.4–1.2)
GFR SERPL CREATININE-BSD FRML MDRD: 27 ML/MIN/1.73M2
GLUCOSE BLD STRIP.AUTO-MCNC: 149 MG/DL (ref 70–108)
GLUCOSE BLD STRIP.AUTO-MCNC: 162 MG/DL (ref 70–108)
GLUCOSE BLD STRIP.AUTO-MCNC: 190 MG/DL (ref 70–108)
GLUCOSE BLD STRIP.AUTO-MCNC: 231 MG/DL (ref 70–108)
GLUCOSE SERPL-MCNC: 253 MG/DL (ref 70–108)
OSMOLALITY SERPL: 287 MOSMOL/KG (ref 275–295)
POTASSIUM SERPL-SCNC: 4.1 MEQ/L (ref 3.5–5.2)
SODIUM SERPL-SCNC: 130 MEQ/L (ref 135–145)

## 2024-06-11 PROCEDURE — 2580000003 HC RX 258: Performed by: PHYSICIAN ASSISTANT

## 2024-06-11 PROCEDURE — 6360000002 HC RX W HCPCS: Performed by: PHYSICIAN ASSISTANT

## 2024-06-11 PROCEDURE — 2500000003 HC RX 250 WO HCPCS: Performed by: INTERNAL MEDICINE

## 2024-06-11 PROCEDURE — 82948 REAGENT STRIP/BLOOD GLUCOSE: CPT

## 2024-06-11 PROCEDURE — 76770 US EXAM ABDO BACK WALL COMP: CPT

## 2024-06-11 PROCEDURE — 2580000003 HC RX 258: Performed by: INTERNAL MEDICINE

## 2024-06-11 PROCEDURE — 99233 SBSQ HOSP IP/OBS HIGH 50: CPT | Performed by: PHYSICIAN ASSISTANT

## 2024-06-11 PROCEDURE — 80048 BASIC METABOLIC PNL TOTAL CA: CPT

## 2024-06-11 PROCEDURE — 1200000003 HC TELEMETRY R&B

## 2024-06-11 PROCEDURE — 36415 COLL VENOUS BLD VENIPUNCTURE: CPT

## 2024-06-11 PROCEDURE — 82550 ASSAY OF CK (CPK): CPT

## 2024-06-11 PROCEDURE — 6370000000 HC RX 637 (ALT 250 FOR IP): Performed by: PHYSICIAN ASSISTANT

## 2024-06-11 PROCEDURE — 83930 ASSAY OF BLOOD OSMOLALITY: CPT

## 2024-06-11 RX ORDER — PRAVASTATIN SODIUM 20 MG
20 TABLET ORAL NIGHTLY
Status: DISCONTINUED | OUTPATIENT
Start: 2024-06-11 | End: 2024-06-14 | Stop reason: HOSPADM

## 2024-06-11 RX ORDER — DEXTROSE MONOHYDRATE 100 MG/ML
INJECTION, SOLUTION INTRAVENOUS CONTINUOUS PRN
Status: DISCONTINUED | OUTPATIENT
Start: 2024-06-11 | End: 2024-06-14 | Stop reason: HOSPADM

## 2024-06-11 RX ORDER — BUMETANIDE 1 MG/1
2 TABLET ORAL 2 TIMES DAILY
Status: DISCONTINUED | OUTPATIENT
Start: 2024-06-11 | End: 2024-06-13

## 2024-06-11 RX ORDER — ONDANSETRON 2 MG/ML
4 INJECTION INTRAMUSCULAR; INTRAVENOUS EVERY 6 HOURS PRN
Status: DISCONTINUED | OUTPATIENT
Start: 2024-06-11 | End: 2024-06-14 | Stop reason: HOSPADM

## 2024-06-11 RX ORDER — ENOXAPARIN SODIUM 100 MG/ML
40 INJECTION SUBCUTANEOUS EVERY 12 HOURS
Status: DISCONTINUED | OUTPATIENT
Start: 2024-06-11 | End: 2024-06-14 | Stop reason: HOSPADM

## 2024-06-11 RX ORDER — ALLOPURINOL 100 MG/1
200 TABLET ORAL DAILY
Status: DISCONTINUED | OUTPATIENT
Start: 2024-06-11 | End: 2024-06-14 | Stop reason: HOSPADM

## 2024-06-11 RX ORDER — MAGNESIUM SULFATE IN WATER 40 MG/ML
2000 INJECTION, SOLUTION INTRAVENOUS PRN
Status: DISCONTINUED | OUTPATIENT
Start: 2024-06-11 | End: 2024-06-14 | Stop reason: HOSPADM

## 2024-06-11 RX ORDER — SODIUM CHLORIDE 0.9 % (FLUSH) 0.9 %
5-40 SYRINGE (ML) INJECTION PRN
Status: DISCONTINUED | OUTPATIENT
Start: 2024-06-11 | End: 2024-06-14 | Stop reason: HOSPADM

## 2024-06-11 RX ORDER — LOSARTAN POTASSIUM 100 MG/1
100 TABLET ORAL DAILY
Status: DISCONTINUED | OUTPATIENT
Start: 2024-06-11 | End: 2024-06-13

## 2024-06-11 RX ORDER — ONDANSETRON 4 MG/1
4 TABLET, ORALLY DISINTEGRATING ORAL EVERY 8 HOURS PRN
Status: DISCONTINUED | OUTPATIENT
Start: 2024-06-11 | End: 2024-06-14 | Stop reason: HOSPADM

## 2024-06-11 RX ORDER — CINACALCET 30 MG/1
30 TABLET, FILM COATED ORAL 2 TIMES DAILY
Status: DISCONTINUED | OUTPATIENT
Start: 2024-06-11 | End: 2024-06-14 | Stop reason: HOSPADM

## 2024-06-11 RX ORDER — ACETAMINOPHEN 325 MG/1
650 TABLET ORAL EVERY 6 HOURS PRN
Status: DISCONTINUED | OUTPATIENT
Start: 2024-06-11 | End: 2024-06-14 | Stop reason: HOSPADM

## 2024-06-11 RX ORDER — POTASSIUM CHLORIDE 7.45 MG/ML
10 INJECTION INTRAVENOUS PRN
Status: DISCONTINUED | OUTPATIENT
Start: 2024-06-11 | End: 2024-06-14 | Stop reason: HOSPADM

## 2024-06-11 RX ORDER — INSULIN LISPRO 100 [IU]/ML
0-8 INJECTION, SOLUTION INTRAVENOUS; SUBCUTANEOUS
Status: DISCONTINUED | OUTPATIENT
Start: 2024-06-11 | End: 2024-06-14 | Stop reason: HOSPADM

## 2024-06-11 RX ORDER — ASPIRIN 81 MG/1
81 TABLET, CHEWABLE ORAL DAILY
Status: DISCONTINUED | OUTPATIENT
Start: 2024-06-11 | End: 2024-06-14 | Stop reason: HOSPADM

## 2024-06-11 RX ORDER — SODIUM CHLORIDE 0.9 % (FLUSH) 0.9 %
5-40 SYRINGE (ML) INJECTION EVERY 12 HOURS SCHEDULED
Status: DISCONTINUED | OUTPATIENT
Start: 2024-06-11 | End: 2024-06-14 | Stop reason: HOSPADM

## 2024-06-11 RX ORDER — SODIUM CHLORIDE 9 MG/ML
INJECTION, SOLUTION INTRAVENOUS PRN
Status: DISCONTINUED | OUTPATIENT
Start: 2024-06-11 | End: 2024-06-14 | Stop reason: HOSPADM

## 2024-06-11 RX ORDER — INSULIN LISPRO 100 [IU]/ML
0-4 INJECTION, SOLUTION INTRAVENOUS; SUBCUTANEOUS NIGHTLY
Status: DISCONTINUED | OUTPATIENT
Start: 2024-06-11 | End: 2024-06-14 | Stop reason: HOSPADM

## 2024-06-11 RX ORDER — SODIUM CHLORIDE 9 MG/ML
INJECTION, SOLUTION INTRAVENOUS CONTINUOUS
Status: DISCONTINUED | OUTPATIENT
Start: 2024-06-11 | End: 2024-06-11

## 2024-06-11 RX ORDER — GLUCAGON 1 MG/ML
1 KIT INJECTION PRN
Status: DISCONTINUED | OUTPATIENT
Start: 2024-06-11 | End: 2024-06-14 | Stop reason: HOSPADM

## 2024-06-11 RX ORDER — POLYETHYLENE GLYCOL 3350 17 G/17G
17 POWDER, FOR SOLUTION ORAL DAILY PRN
Status: DISCONTINUED | OUTPATIENT
Start: 2024-06-11 | End: 2024-06-14 | Stop reason: HOSPADM

## 2024-06-11 RX ORDER — POTASSIUM CHLORIDE 20 MEQ/1
40 TABLET, EXTENDED RELEASE ORAL PRN
Status: DISCONTINUED | OUTPATIENT
Start: 2024-06-11 | End: 2024-06-14 | Stop reason: HOSPADM

## 2024-06-11 RX ORDER — ACETAMINOPHEN 650 MG/1
650 SUPPOSITORY RECTAL EVERY 6 HOURS PRN
Status: DISCONTINUED | OUTPATIENT
Start: 2024-06-11 | End: 2024-06-14 | Stop reason: HOSPADM

## 2024-06-11 RX ADMIN — PRAVASTATIN SODIUM 20 MG: 20 TABLET ORAL at 20:43

## 2024-06-11 RX ADMIN — CINACALCET HYDROCHLORIDE 30 MG: 30 TABLET, FILM COATED ORAL at 20:43

## 2024-06-11 RX ADMIN — CINACALCET HYDROCHLORIDE 30 MG: 30 TABLET, FILM COATED ORAL at 10:35

## 2024-06-11 RX ADMIN — ALLOPURINOL 200 MG: 100 TABLET ORAL at 10:35

## 2024-06-11 RX ADMIN — SODIUM CHLORIDE: 9 INJECTION, SOLUTION INTRAVENOUS at 07:23

## 2024-06-11 RX ADMIN — SODIUM BICARBONATE: 84 INJECTION, SOLUTION INTRAVENOUS at 11:37

## 2024-06-11 RX ADMIN — INSULIN LISPRO 2 UNITS: 100 INJECTION, SOLUTION INTRAVENOUS; SUBCUTANEOUS at 11:42

## 2024-06-11 RX ADMIN — SODIUM CHLORIDE, PRESERVATIVE FREE 10 ML: 5 INJECTION INTRAVENOUS at 02:07

## 2024-06-11 RX ADMIN — ENOXAPARIN SODIUM 40 MG: 100 INJECTION SUBCUTANEOUS at 02:07

## 2024-06-11 RX ADMIN — GLIPIZIDE 15 MG: 10 TABLET ORAL at 10:35

## 2024-06-11 RX ADMIN — ASPIRIN 81 MG 81 MG: 81 TABLET ORAL at 10:36

## 2024-06-11 RX ADMIN — ENOXAPARIN SODIUM 40 MG: 100 INJECTION SUBCUTANEOUS at 14:03

## 2024-06-11 ASSESSMENT — PAIN - FUNCTIONAL ASSESSMENT: PAIN_FUNCTIONAL_ASSESSMENT: NONE - DENIES PAIN

## 2024-06-11 ASSESSMENT — ENCOUNTER SYMPTOMS
NAUSEA: 1
ALLERGIC/IMMUNOLOGIC NEGATIVE: 1
RESPIRATORY NEGATIVE: 1
VOMITING: 1
EYES NEGATIVE: 1

## 2024-06-11 NOTE — ED NOTES
Pt resting sitting up in bed. Pt and family updated on POC, verbalized understanding. Call light in reach. Telemetry in place.

## 2024-06-11 NOTE — CARE COORDINATION
Case Management Assessment Initial Evaluation    Date/Time of Evaluation: 6/11/2024 8:05 AM  Assessment Completed by: Jeanie Ching RN    If patient is discharged prior to next notation, then this note serves as note for discharge by case management.    Patient Name: Clem Arizmendi                   YOB: 1955  Diagnosis: Body aches [R52]  LISA (acute kidney injury) (HCC) [N17.9]  Nonintractable headache, unspecified chronicity pattern, unspecified headache type [R51.9]                   Date / Time: 6/10/2024  8:39 PM  [facility]: [facility]     Patient Admission Status: Inpatient   Readmission Risk Low 0-14, Mod 15-19), High > 20: Readmission Risk Score: 10.2    Current PCP: Michael Foy MD    Additional Case Management Notes: To ED with fever and generalized body aches. He also c/o vomiting. Hospitalist following. Nephrology to see, baseline creat 1.6; 2.7 on admit. IVF. Allopurinol. PO bumex. Finerenone. DM management. Na 129.     Procedures: none    Imaging: none     Patient Goals/Plan/Treatment Preferences: Met w/ [facility]. Verified PCP and insurance. He is independent and actively drives. Has cpap from [employer]. Denies needs for HH/OP services or DME. Plans to return home w/ wife.      06/11/24 0900   Service Assessment   Patient Orientation Alert and Oriented   Cognition Alert   History Provided By Patient   Primary Caregiver Self   Accompanied By/Relationship son   Support Systems Spouse/Significant Other;Children;Family Members;Friends/Neighbors   Patient's Healthcare Decision Maker is: Legal Next of Kin  (consult)   PCP Verified by CM Yes   Last Visit to PCP Within last 6 months   Prior Functional Level Independent in ADLs/IADLs   Current Functional Level Independent in ADLs/IADLs   Can patient return to prior living arrangement Yes   Ability to make needs known: Good   Family able to assist with home care needs: Yes   Would you like for me to discuss the discharge plan with any other family

## 2024-06-11 NOTE — ED NOTES
Patient transported to  [facility] by wheelchair in stable condition.   IV  line is patent.

## 2024-06-11 NOTE — PROGRESS NOTES
[facility]   PROGRESS NOTE      Patient: Clem Arizmendi  Room #: 6K-11/011-A            YOB: 1955  Age: 68 y.o.  Gender: male            Admit Date & Time: 6/10/2024  8:39 PM    Assessment:    The  attempted a visit for ACP conversation and patient was not located in their room's care area.     Interventions:  The patient was not present to provide care for at this time.    Outcomes:  The patient was left a copy of ACP documents and will be followed up on later.     Plan:  1.Spiritual care will continue to follow the patient according to [facility] spiritual care SOP.       Electronically signed by Chaplain Ariana, on 6/11/2024 at 1:31 PM.  Spiritual Care Department  [facility]  [phone]     06/11/24 1330   Encounter Summary   Encounter Overview/Reason Attempted Encounter;Advance Care Planning   Service Provided For Patient not available   Referral/Consult From Nurse   Support System Unknown   Last Encounter  06/11/24  (POA left in room)   Complexity of Encounter Low   Begin Time 1235   End Time  1240   Total Time Calculated 5 min   Advance Care Planning   Type   (POA left in room)

## 2024-06-11 NOTE — ED PROVIDER NOTES
[facility] EMERGENCY DEPT    Pt Name: Clem Arizmendi  MRN: [id number]  Birthdate 1955  Date of evaluation: 6/10/2024  Resident Physician: Elo Cali MD  Supervising Physician: Dr. Christie Johnson MD    CHIEF COMPLAINT       Chief Complaint   Patient presents with    Fever    Generalized Body Aches       HISTORY OF PRESENT ILLNESS   Clem Arizmendi is a 68 y.o. male with PMHx of CAD, chf, dm, nnamdi, htn, hld, ckd, membranous glomerulonephritis with nephrosis, hyperparathyroidism  who presents to the emergency department for evaluation of fever, generalized body aches.     Pt stating that sxs started 2 days ago on Saturday. Started off as a headache, and chills. Pt also had emesis x1 yesterday and x1 today. Denies any nausea right now. Pt states he has a headache 7/10 now. Pt also with generalized body aches.     Denies any sick contacts. Denies any fevers at home. Tmax 100F.   Pt does follow with nephrology at [facility], Dr. Dukes     The patient has no other acute complaints at this time.    Review of Systems    Negative Except as Documented Above.    PASTMEDICAL HISTORY     Past Medical History:   Diagnosis Date    Arthritis     CAD (coronary artery disease)     CHF (congestive heart failure) (HCC)     Chronic kidney disease     Diabetes mellitus (HCC)     Hyperlipidemia     Hypertension     Lower extremity edema     Membranous nephropathy determined by biopsy     Movement disorder     Obesity     Obstructive sleep apnea     on CPAP    Pneumonia     SOB (shortness of breath)        Patient Active Problem List   Diagnosis Code    Hypertension I10    Hyperlipidemia E78.5    Obesity E66.9    SOB (shortness of breath) R06.02    Obstructive sleep apnea G47.33    Lower extremity edema R60.0    Membranous glomerulonephritis with nephrosis N04.20    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 )    Medical Decision Making     68-year-old male past medical history significant for CKD, membranous glomerulonephritis presents to the ED for generalized bodyaches, fever, headache, nausea and vomiting ongoing since Saturday.  Also having history of incontinence.  When I walk into the room, patient has had had an episode of incontinence and needed to be changed.  Denies any pain to the abdomen.  Electrolytes were obtained.  Patient does have a creatinine of 2.7.  His baseline is normally 1.6.  Patient does follow with nephrology at [facility].  Patient urinalysis did show moderate blood and protein.  Clean-catch was obtained and a WBC of 5-9.  No bacteria.  At this time, will await cultures for treatment.  In the ED, patient was given 1 L of IV fluid bolus as well as Tylenol Compazine and Benadryl for his headache which did improve.  At this time, I like to admit this patient for LISA.    FINAL IMPRESSION      1. LISA (acute kidney injury) (HCC)    2. Nonintractable headache, unspecified chronicity pattern, unspecified headache type    3. Body aches          DISPOSITION/PLAN   Condition: condition: stable  Dispo: Admit to med/surg floor  DISPOSITION Decision To Admit 06/10/2024 11:05:56 PM      Outpatient follow up (If applicable):  No follow-up provider specified.  Not applicable          DISCHARGE PRESCRIPTIONS: (None if blank)  New Prescriptions    No medications on file         This transcription was electronically signed. Parts of this transcriptions may have been dictated by use of

## 2024-06-11 NOTE — CONSULTS
Nephrology Consult Note  [facility]    PatPt Name: Clem Arizmendi  MRN: [id number]  [id number]  YOB: 1955  Admit Date: 6/10/2024  8:39 PM  Date of evaluation: 6/11/2024  Primary Care Physician: Michael Foy MD   6K-11/011-A     Nephrologist:  Nguyen Nava MD, [facility]    Reason for Consult:  gin  Requesting Physician: hospitalist  Primary Care Physician  Michael Foy MD    Chief Complaint:  flu   History Obtained From:  patient    History of Present Ilness:    Patient is 69 y/o male who presents to the ED with flu like symptoms for the past two days.  Patient reported headaches, myalgias, nausea and vomiting, no diarrhea.  The patient has had decreased oral intake as well.  The patient took his bumetanide despite his decreased oral intake and had less urination throughout the day as he was notching more swelling of his legs. He denied urinary symptoms of burning. He follows with Dr Robb outpatient. He denied chronic intake of NSAIDS or history of kidney stones.           PMHx:   Past Medical History:   Diagnosis Date    Arthritis     CAD (coronary artery disease)     CHF (congestive heart failure) (HCC)     Chronic kidney disease     Diabetes mellitus (HCC)     Hyperlipidemia     Hypertension     Lower extremity edema     Membranous nephropathy determined by biopsy     Movement disorder     Obesity     Obstructive sleep apnea     on CPAP    Pneumonia     SOB (shortness of breath)        PSHx:   Past Surgical History:   Procedure Laterality Date    CARPAL TUNNEL RELEASE Left 2022    CHOLECYSTECTOMY  1980'S    COLONOSCOPY  2016    Dr. Tripp    COLONOSCOPY  09/08/2020    COLONOSCOPY POLYPECTOMY SNARE/COLD BIOPSY performed by Zac Tripp MD at [facility]    EYE SURGERY Left 12/2016    Dr. Ramos's office    JOINT REPLACEMENT Bilateral 06/03/2014    total -  Dr. Hobbs @ [facility]    KNEE SURGERY      PARATHYROID GLAND SURGERY Right 07/28/2022    Right

## 2024-06-11 NOTE — PROGRESS NOTES
Hospitalist Progress Note      Patient:  Clem Arizmendi 68 y.o. male     : 1955  Unit/Bed:6-011-A  Date of Admission: 6/10/2024        ASSESSMENT AND PLAN    Active Problems  LISA on CKD 3a  with membranous glomerulonephritis with nephrosis  Nephrology consulted.  Follows at [facility] outpatient  Baseline creatinine 1.6.  Creatinine 2.7 improved to 2.5 today.   BMP daily   Suspected prerenal due to volume depletion  On gentle fluids  Hold bumex and cozaar  Renal US pending     Type 2 diabetes mellitus  Home metformin held.  Continue glipizide  Medium dose sliding scale, hypoglycemia protocol, POCT ACHS    PseudoHyponatremia  Currently 129.  Continue gentle IV fluids.  BMP daily  Serum osmo normal     Hypertension  Hold Cozaar and Bumex given LISA  Hydralazine as needed for SBP greater than 170    Resolved Problems  N/A   Chronic Conditions (reviewed and stable unless otherwise stated)  NNAMDI on CPAP  Primary Hyperparathyroidism  CAD with hyperlipidemia      LDA []None  Antibiotics: none  Steroids: none  Labs (still needed?)  [x]Yes nNo  IVF (still needed?)  [x]Yes nNo    Level of care []Step Down [x]Med-Surg  Bed Status [x]Inpatient Observation  Telemetry []Yes No  PT/OT []No    DVT Prophylaxis[] Lovenox   Expected discharge date:  unknown   Disposition: home   Code status: Full Code     ===================================================================    Chief Complaint: LISA on CKD     HPI: Patient presents to the ED with flu like symptoms for the past two days.  Patient reports headaches, myalgias, nausea and vomiting, no diarrhea.  The patient has had decreased oral intake as well.  The patient still took his bumetanide despite his decreased oral intake and had less urination throughout the day.     Patient is found to have LISA on pre-existing CKD and renal pathology followed by [facility] nephrology.  Patient is admitted for gentle IV hydration and nephrology consult.      Subjective (past 24 hours):

## 2024-06-11 NOTE — ED NOTES
Pt updated on plan of care at this time. Pt voices no new needs or concerns. Call light in reach. Telemetry in place.

## 2024-06-11 NOTE — H&P
650 mg, Q6H PRN   Or  acetaminophen, 650 mg, Q6H PRN        Labs:   Recent Results (from the past 24 hour(s))   COVID-19 & Influenza Combo    Collection Time: 06/10/24  8:45 PM    Specimen: Nasopharyngeal Swab   Result Value Ref Range    SARS-CoV-2 RNA, RT PCR NOT DETECTED NOT DETECTED    Influenza A NOT DETECTED NOT DETECTED    Influenza B NOT DETECTED NOT DETECTED   CBC with Auto Differential    Collection Time: 06/10/24  9:24 PM   Result Value Ref Range    WBC 12.3 (H) 4.8 - 10.8 thou/mm3    RBC 4.89 4.70 - 6.10 mill/mm3    Hemoglobin 14.0 14.0 - 18.0 gm/dl    Hematocrit 42.3 42.0 - 52.0 %    MCV 86.5 80.0 - 94.0 fL    MCH 28.6 26.0 - 33.0 pg    MCHC 33.1 32.2 - 35.5 gm/dl    RDW-CV 13.2 11.5 - 14.5 %    RDW-SD 41.8 35.0 - 45.0 fL    Platelets 163 130 - 400 thou/mm3    MPV 10.0 9.4 - 12.4 fL    Seg Neutrophils 82.7 %    Lymphocytes 4.7 %    Monocytes % 11.5 %    Eosinophils 0.2 %    Basophils 0.2 %    Immature Granulocytes % 0.7 %    Neutrophils Absolute 10.2 (H) 1.8 - 7.7 thou/mm3    Lymphocytes Absolute 0.6 (L) 1.0 - 4.8 thou/mm3    Monocytes Absolute 1.4 (H) 0.4 - 1.3 thou/mm3    Eosinophils Absolute 0.0 0.0 - 0.4 thou/mm3    Basophils Absolute 0.0 0.0 - 0.1 thou/mm3    Immature Grans (Abs) 0.08 (H) 0.00 - 0.07 thou/mm3    nRBC 0 /100 wbc   CMP    Collection Time: 06/10/24  9:24 PM   Result Value Ref Range    Glucose 217 (H) 70 - 108 mg/dL    Creatinine 2.7 (H) 0.4 - 1.2 mg/dL    BUN 32 (H) 7 - 22 mg/dL    Sodium 129 (L) 135 - 145 meq/L    Potassium 4.3 3.5 - 5.2 meq/L    Chloride 94 (L) 98 - 111 meq/L    CO2 24 23 - 33 meq/L    Calcium 10.0 8.5 - 10.5 mg/dL    AST 17 5 - 40 U/L    Alkaline Phosphatase 90 38 - 126 U/L    Total Protein 7.1 6.1 - 8.0 g/dL    Albumin 3.9 3.5 - 5.1 g/dL    Total Bilirubin 0.8 0.3 - 1.2 mg/dL    ALT 22 11 - 66 U/L   Anion Gap    Collection Time: 06/10/24  9:24 PM   Result Value Ref Range    Anion Gap 11.0 8.0 - 16.0 meq/L   Glomerular Filtration Rate, Estimated    Collection Time:  and imaging are personally viewed and interpreted unless otherwise noted).   EKG:  none this encounter        Electronically signed by  Becca Lopez PA-C

## 2024-06-11 NOTE — ED TRIAGE NOTES
Pt comes to ED with c/o body aches, and fever. Pt states that symptoms started on Saturday. Pt has also been vomiting. Pt states that he was having incontinence due to taking his diuretic on Saturday night. Pt also complains of dizziness.

## 2024-06-11 NOTE — PLAN OF CARE
Problem: Discharge Planning  Goal: Discharge to home or other facility with appropriate resources  6/11/2024 1515 by Anuradha Cabrera, RN  Outcome: Progressing  Flowsheets (Taken 6/11/2024 0800)  Discharge to home or other facility with appropriate resources: Identify barriers to discharge with patient and caregiver     Problem: Chronic Conditions and Co-morbidities  Goal: Patient's chronic conditions and co-morbidity symptoms are monitored and maintained or improved  6/11/2024 1515 by Anuradha Cabrera RN  Outcome: Progressing  Flowsheets (Taken 6/11/2024 0800)  Care Plan - Patient's Chronic Conditions and Co-Morbidity Symptoms are Monitored and Maintained or Improved:   Monitor and assess patient's chronic conditions and comorbid symptoms for stability, deterioration, or improvement   Collaborate with multidisciplinary team to address chronic and comorbid conditions and prevent exacerbation or deterioration   Update acute care plan with appropriate goals if chronic or comorbid symptoms are exacerbated and prevent overall improvement and discharge     Problem: Safety - Adult  Goal: Free from fall injury  6/11/2024 1515 by Anuradha Cabrera RN  Outcome: Progressing  All fall precautions in place. Bed in low position, alarm activated and appropriate use of call light.      Problem: Respiratory - Adult  Goal: Achieves optimal ventilation and oxygenation  Outcome: Progressing  Flowsheets (Taken 6/11/2024 0800)  Achieves optimal ventilation and oxygenation:   Assess for changes in respiratory status   Assess for changes in mentation and behavior   Position to facilitate oxygenation and minimize respiratory effort     Problem: Metabolic/Fluid and Electrolytes - Adult  Goal: Hemodynamic stability and optimal renal function maintained  Outcome: Progressing  Flowsheets (Taken 6/11/2024 0800)  Hemodynamic stability and optimal renal function maintained:   Monitor labs and assess for signs and symptoms of volume excess or deficit

## 2024-06-11 NOTE — ED NOTES
ED to inpatient nurses report      Chief Complaint:  Chief Complaint   Patient presents with    Fever    Generalized Body Aches     Present to ED from: home    MOA:     LOC: alert and orientated to name, place, date  Mobility: Requires assistance * 1  Oxygen Baseline: room air    Current needs required: room air     Code Status:   Full Code    What abnormal results were found and what did you give/do to treat them? Creatinine  Any procedures or intervention occur? CT, Xray    Mental Status:  Level of Consciousness: Alert (0)    Psych Assessment:        Vitals:  Patient Vitals for the past 24 hrs:   BP Temp Temp src Pulse Resp SpO2 Height Weight   06/11/24 0018 139/71 -- -- 70 20 100 % -- --   06/10/24 2258 (!) 157/90 -- -- 70 27 98 % -- --   06/10/24 2213 (!) 167/83 -- -- 70 25 94 % -- --   06/10/24 2043 (!) 174/86 99.1 °F (37.3 °C) Oral 78 22 94 % 1.803 m (5' 11\") (!) 158.8 kg (350 lb)        LDAs:   Peripheral IV 06/10/24 Right Antecubital (Active)   Site Assessment Clean, dry & intact 06/10/24 2219   Line Status Flushed 06/10/24 2219   Line Care Connections checked and tightened 06/10/24 2219   Phlebitis Assessment No symptoms 06/10/24 2219   Infiltration Assessment 0 06/10/24 2219   Dressing Status Clean, dry & intact 06/10/24 2219   Dressing Type Transparent 06/10/24 2219       Ambulatory Status:  No data recorded    Diagnosis:  DISPOSITION Admitted 06/11/2024 12:38:13 AM   Final diagnoses:   Nonintractable headache, unspecified chronicity pattern, unspecified headache type   LISA (acute kidney injury) (HCC)   Body aches        Consults:  None     Pain Score:  Pain Assessment  Pain Assessment: None - Denies Pain  Pain Level: 7    C-SSRS:   Risk of Suicide: No Risk    Sepsis Screening:  Sepsis Risk Score: 5.34    Portia Fall Risk:       Swallow Screening        Preferred Language:   English      ALLERGIES     Bactrim [sulfamethoxazole-trimethoprim], Celebrex [celecoxib], Diclofenac, Dolobid [diflunisal],

## 2024-06-12 ENCOUNTER — APPOINTMENT (OUTPATIENT)
Age: 69
End: 2024-06-12
Attending: PHYSICIAN ASSISTANT
Payer: MEDICARE

## 2024-06-12 LAB
ANION GAP SERPL CALC-SCNC: 13 MEQ/L (ref 8–16)
BUN SERPL-MCNC: 30 MG/DL (ref 7–22)
CALCIUM SERPL-MCNC: 10.4 MG/DL (ref 8.5–10.5)
CHLORIDE SERPL-SCNC: 98 MEQ/L (ref 98–111)
CO2 SERPL-SCNC: 20 MEQ/L (ref 23–33)
CREAT SERPL-MCNC: 1.9 MG/DL (ref 0.4–1.2)
CREAT UR-MCNC: 57.5 MG/DL
ECHO AR MAX VEL PISA: 4.5 M/S
ECHO AV CUSP MM: 2.4 CM
ECHO AV PEAK GRADIENT: 12 MMHG
ECHO AV PEAK VELOCITY: 1.8 M/S
ECHO AV REGURGITANT PHT: 649 MS
ECHO AV VELOCITY RATIO: 0.56
ECHO BSA: 2.8 M2
ECHO EST RA PRESSURE: 15 MMHG
ECHO IVC PROX: 2.5 CM
ECHO LA AREA 2C: 27.4 CM2
ECHO LA AREA 4C: 26.8 CM2
ECHO LA DIAMETER INDEX: 1.58 CM/M2
ECHO LA DIAMETER: 4.2 CM
ECHO LA MAJOR AXIS: 7.1 CM
ECHO LA MINOR AXIS: 6.9 CM
ECHO LA VOL BP: 85 ML (ref 18–58)
ECHO LA VOL MOD A2C: 89 ML (ref 18–58)
ECHO LA VOL MOD A4C: 80 ML (ref 18–58)
ECHO LA VOL/BSA BIPLANE: 32 ML/M2 (ref 16–34)
ECHO LA VOLUME INDEX MOD A2C: 33 ML/M2 (ref 16–34)
ECHO LA VOLUME INDEX MOD A4C: 30 ML/M2 (ref 16–34)
ECHO LV E' LATERAL VELOCITY: 11 CM/S
ECHO LV E' SEPTAL VELOCITY: 8 CM/S
ECHO LV FRACTIONAL SHORTENING: 28 % (ref 28–44)
ECHO LV INTERNAL DIMENSION DIASTOLE INDEX: 2.29 CM/M2
ECHO LV INTERNAL DIMENSION DIASTOLIC: 6.1 CM (ref 4.2–5.9)
ECHO LV INTERNAL DIMENSION SYSTOLIC INDEX: 1.65 CM/M2
ECHO LV INTERNAL DIMENSION SYSTOLIC: 4.4 CM
ECHO LV ISOVOLUMETRIC RELAXATION TIME (IVRT): 77 MS
ECHO LV IVSD: 1.2 CM (ref 0.6–1)
ECHO LV MASS 2D: 322.7 G (ref 88–224)
ECHO LV MASS INDEX 2D: 121.3 G/M2 (ref 49–115)
ECHO LV POSTERIOR WALL DIASTOLIC: 1.2 CM (ref 0.6–1)
ECHO LV RELATIVE WALL THICKNESS RATIO: 0.39
ECHO LVOT PEAK GRADIENT: 4 MMHG
ECHO LVOT PEAK VELOCITY: 1 M/S
ECHO MV A VELOCITY: 0.91 M/S
ECHO MV E DECELERATION TIME (DT): 373 MS
ECHO MV E VELOCITY: 0.74 M/S
ECHO MV E/A RATIO: 0.81
ECHO MV E/E' LATERAL: 6.73
ECHO MV E/E' RATIO (AVERAGED): 7.99
ECHO MV E/E' SEPTAL: 9.25
ECHO PV MAX VELOCITY: 0.6 M/S
ECHO PV PEAK GRADIENT: 1 MMHG
ECHO RIGHT VENTRICULAR SYSTOLIC PRESSURE (RVSP): 44 MMHG
ECHO RV INTERNAL DIMENSION: 4.8 CM
ECHO RV TAPSE: 2.5 CM (ref 1.7–?)
ECHO TV E WAVE: 0.7 M/S
ECHO TV REGURGITANT MAX VELOCITY: 2.69 M/S
ECHO TV REGURGITANT PEAK GRADIENT: 29 MMHG
GFR SERPL CREATININE-BSD FRML MDRD: 38 ML/MIN/1.73M2
GLUCOSE BLD STRIP.AUTO-MCNC: 148 MG/DL (ref 70–108)
GLUCOSE BLD STRIP.AUTO-MCNC: 168 MG/DL (ref 70–108)
GLUCOSE BLD STRIP.AUTO-MCNC: 201 MG/DL (ref 70–108)
GLUCOSE BLD STRIP.AUTO-MCNC: 203 MG/DL (ref 70–108)
GLUCOSE SERPL-MCNC: 197 MG/DL (ref 70–108)
POTASSIUM SERPL-SCNC: 4.1 MEQ/L (ref 3.5–5.2)
PROT UR-MCNC: 92 MG/DL
PROT/CREAT 24H UR: 1.6 MG/G{CREAT}
SODIUM SERPL-SCNC: 131 MEQ/L (ref 135–145)

## 2024-06-12 PROCEDURE — 80048 BASIC METABOLIC PNL TOTAL CA: CPT

## 2024-06-12 PROCEDURE — 36415 COLL VENOUS BLD VENIPUNCTURE: CPT

## 2024-06-12 PROCEDURE — 1200000003 HC TELEMETRY R&B

## 2024-06-12 PROCEDURE — 6370000000 HC RX 637 (ALT 250 FOR IP): Performed by: PHYSICIAN ASSISTANT

## 2024-06-12 PROCEDURE — 82570 ASSAY OF URINE CREATININE: CPT

## 2024-06-12 PROCEDURE — 93306 TTE W/DOPPLER COMPLETE: CPT

## 2024-06-12 PROCEDURE — 6360000002 HC RX W HCPCS: Performed by: PHYSICIAN ASSISTANT

## 2024-06-12 PROCEDURE — 99232 SBSQ HOSP IP/OBS MODERATE 35: CPT | Performed by: PHYSICIAN ASSISTANT

## 2024-06-12 PROCEDURE — 93306 TTE W/DOPPLER COMPLETE: CPT | Performed by: NUCLEAR MEDICINE

## 2024-06-12 PROCEDURE — 82948 REAGENT STRIP/BLOOD GLUCOSE: CPT

## 2024-06-12 PROCEDURE — 84156 ASSAY OF PROTEIN URINE: CPT

## 2024-06-12 PROCEDURE — 2500000003 HC RX 250 WO HCPCS: Performed by: INTERNAL MEDICINE

## 2024-06-12 PROCEDURE — 2580000003 HC RX 258: Performed by: INTERNAL MEDICINE

## 2024-06-12 RX ADMIN — CINACALCET HYDROCHLORIDE 30 MG: 30 TABLET, FILM COATED ORAL at 09:37

## 2024-06-12 RX ADMIN — SODIUM BICARBONATE: 84 INJECTION, SOLUTION INTRAVENOUS at 11:54

## 2024-06-12 RX ADMIN — ASPIRIN 81 MG 81 MG: 81 TABLET ORAL at 09:37

## 2024-06-12 RX ADMIN — CINACALCET HYDROCHLORIDE 30 MG: 30 TABLET, FILM COATED ORAL at 20:11

## 2024-06-12 RX ADMIN — ALLOPURINOL 200 MG: 100 TABLET ORAL at 09:37

## 2024-06-12 RX ADMIN — INSULIN LISPRO 2 UNITS: 100 INJECTION, SOLUTION INTRAVENOUS; SUBCUTANEOUS at 09:38

## 2024-06-12 RX ADMIN — ENOXAPARIN SODIUM 40 MG: 100 INJECTION SUBCUTANEOUS at 14:51

## 2024-06-12 RX ADMIN — INSULIN LISPRO 2 UNITS: 100 INJECTION, SOLUTION INTRAVENOUS; SUBCUTANEOUS at 11:52

## 2024-06-12 RX ADMIN — ENOXAPARIN SODIUM 40 MG: 100 INJECTION SUBCUTANEOUS at 05:59

## 2024-06-12 RX ADMIN — GLIPIZIDE 15 MG: 10 TABLET ORAL at 09:37

## 2024-06-12 RX ADMIN — PRAVASTATIN SODIUM 20 MG: 20 TABLET ORAL at 20:11

## 2024-06-12 NOTE — PLAN OF CARE
Problem: Discharge Planning  Goal: Discharge to home or other facility with appropriate resources  6/12/2024 0112 by Adolfo Tena RN  Outcome: Progressing  6/11/2024 1515 by Anuradha Cabrera RN  Outcome: Progressing  Flowsheets (Taken 6/11/2024 0800)  Discharge to home or other facility with appropriate resources: Identify barriers to discharge with patient and caregiver     Problem: Chronic Conditions and Co-morbidities  Goal: Patient's chronic conditions and co-morbidity symptoms are monitored and maintained or improved  6/12/2024 0112 by Adolfo Tena RN  Outcome: Progressing  6/11/2024 1515 by Anuradha Cabrera RN  Outcome: Progressing  Flowsheets (Taken 6/11/2024 0800)  Care Plan - Patient's Chronic Conditions and Co-Morbidity Symptoms are Monitored and Maintained or Improved:   Monitor and assess patient's chronic conditions and comorbid symptoms for stability, deterioration, or improvement   Collaborate with multidisciplinary team to address chronic and comorbid conditions and prevent exacerbation or deterioration   Update acute care plan with appropriate goals if chronic or comorbid symptoms are exacerbated and prevent overall improvement and discharge     Problem: Safety - Adult  Goal: Free from fall injury  6/12/2024 0112 by Adoflo Tena RN  Outcome: Progressing  6/11/2024 1515 by Anuradha Cabrera RN  Outcome: Progressing     Problem: Respiratory - Adult  Goal: Achieves optimal ventilation and oxygenation  6/12/2024 0112 by Adolfo Tena RN  Outcome: Progressing  6/11/2024 1515 by Anuradha Cabrera RN  Outcome: Progressing  Flowsheets (Taken 6/11/2024 0800)  Achieves optimal ventilation and oxygenation:   Assess for changes in respiratory status   Assess for changes in mentation and behavior   Position to facilitate oxygenation and minimize respiratory effort     Problem: Metabolic/Fluid and Electrolytes - Adult  Goal: Hemodynamic stability and optimal renal function maintained  6/12/2024 0112 by Darinel

## 2024-06-12 NOTE — PROGRESS NOTES
Hospitalist Progress Note      Patient:  Clem Arizmendi 68 y.o. 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 outpatient  Baseline creatinine 1.6.  Creatinine 2.7 on admit- improved to 1.9  BMP daily   Suspected prerenal due to volume depletion  On gentle fluids- sodium bicarb  Check Echo   Hold bumex and cozaar  Renal US with with left kidney lesion- recommendations for CT contrast or MRI- defer currently due to LISA     Type 2 diabetes mellitus  Home metformin held.  Continue glipizide  Medium dose sliding scale, hypoglycemia protocol, POCT ACHS    PseudoHyponatremia  Currently 131.  Continue gentle IV fluids.  BMP daily  Serum osmo normal     Hypertension  Hold Cozaar and Bumex given LISA  Hydralazine as needed for SBP greater than 170    Resolved Problems  N/A   Chronic Conditions (reviewed and stable unless otherwise stated)  NNAMDI on CPAP  Primary Hyperparathyroidism  CAD with hyperlipidemia      LDA []None  Antibiotics: none  Steroids: none  Labs (still needed?)  [x]Yes nNo  IVF (still needed?)  [x]Yes nNo    Level of care []Step Down [x]Med-Surg  Bed Status [x]Inpatient Observation  Telemetry []Yes No  PT/OT []No    DVT Prophylaxis[] Lovenox   Expected discharge date:  unknown   Disposition: home   Code status: Full Code     ===================================================================    Chief Complaint: LISA on CKD     HPI: Patient presents to the ED with flu like symptoms for the past two days.  Patient reports headaches, myalgias, nausea and vomiting, no diarrhea.  The patient has had decreased oral intake as well.  The patient still took his bumetanide despite his decreased oral intake and had less urination throughout the day.     Patient is found to have LISA on pre-existing CKD and renal pathology followed

## 2024-06-12 NOTE — PROGRESS NOTES
Renal Progress Note    Pt Name: Clem Arizmendi  MRN: [id number]  [id number]  YOB: 1955  Admit Date: 6/10/2024  8:39 PM  Date of evaluation: 6/12/2024  Primary Care Physician: Michael Foy MD   6K-11/011-A       Subjective:     Interval History:   No distress  Good urine output  LE swelling better    Diet: ADULT DIET; Regular; Low Fat/Low Chol/High Fiber/2 gm Na; Low Sodium (2 gm); Low Potassium (Less than 3000 mg/day); Low Phosphorus (Less than 1000 mg)    Medications:   Scheduled Meds:   sodium chloride flush  5-40 mL IntraVENous 2 times per day    enoxaparin  40 mg SubCUTAneous Q12H    allopurinol  200 mg Oral Daily    aspirin  81 mg Oral Daily    [Held by provider] bumetanide  2 mg Oral BID    cinacalcet  30 mg Oral BID    Finerenone  10 mg Oral Daily    glipiZIDE  15 mg Oral QAM    [Held by provider] losartan  100 mg Oral Daily    pravastatin  20 mg Oral Nightly    insulin lispro  0-8 Units SubCUTAneous TID WC    insulin lispro  0-4 Units SubCUTAneous Nightly     Continuous Infusions:   sodium chloride      dextrose      sodium bicarbonate 50 mEq in sodium chloride 0.45 % 1,000 mL infusion 50 mL/hr at 06/12/24 1154       Objective:   Vitals:   /86   Pulse 57   Temp 98.1 °F (36.7 °C) (Oral)   Resp 18   Ht 1.803 m (5' 11\")   Wt (!) 156.7 kg (345 lb 8 oz)   SpO2 97%   BMI 48.19 kg/m²     I&O's:    Intake/Output Summary (Last 24 hours) at 6/12/2024 1805  Last data filed at 6/12/2024 1744  Gross per 24 hour   Intake 970 ml   Output 5525 ml   Net -4555 ml     I/O last 3 completed shifts:  In: 490 [P.O.:490]  Out: 4300 [Urine:4300]   Date 06/12/24 0000 - 06/12/24 2359   Shift 8244-6501 7557-8665 0335-5690 24 Hour Total   INTAKE   P.O.(mL/kg/hr) 250(0.2) 600(0.5) 120 970   Shift Total(mL/kg) 250(1.6) 600(3.8) 120(0.8) 970(6.2)   OUTPUT   Urine(mL/kg/hr) 1200(1) 2175(1.7) 500 3875   Shift Total(mL/kg) 1200(7.7) 2175(13.9) 500(3.2) 3875(24.7)   Weight (kg) 156.7 156.7 156.7 156.7

## 2024-06-13 LAB
ANION GAP SERPL CALC-SCNC: 15 MEQ/L (ref 8–16)
BUN SERPL-MCNC: 29 MG/DL (ref 7–22)
CALCIUM SERPL-MCNC: 10.8 MG/DL (ref 8.5–10.5)
CHLORIDE SERPL-SCNC: 98 MEQ/L (ref 98–111)
CO2 SERPL-SCNC: 20 MEQ/L (ref 23–33)
CREAT SERPL-MCNC: 1.8 MG/DL (ref 0.4–1.2)
GFR SERPL CREATININE-BSD FRML MDRD: 41 ML/MIN/1.73M2
GLUCOSE BLD STRIP.AUTO-MCNC: 192 MG/DL (ref 70–108)
GLUCOSE BLD STRIP.AUTO-MCNC: 198 MG/DL (ref 70–108)
GLUCOSE BLD STRIP.AUTO-MCNC: 205 MG/DL (ref 70–108)
GLUCOSE BLD STRIP.AUTO-MCNC: 88 MG/DL (ref 70–108)
GLUCOSE SERPL-MCNC: 210 MG/DL (ref 70–108)
POTASSIUM SERPL-SCNC: 3.9 MEQ/L (ref 3.5–5.2)
SODIUM SERPL-SCNC: 133 MEQ/L (ref 135–145)

## 2024-06-13 PROCEDURE — 36415 COLL VENOUS BLD VENIPUNCTURE: CPT

## 2024-06-13 PROCEDURE — 6370000000 HC RX 637 (ALT 250 FOR IP): Performed by: INTERNAL MEDICINE

## 2024-06-13 PROCEDURE — 2580000003 HC RX 258: Performed by: PHYSICIAN ASSISTANT

## 2024-06-13 PROCEDURE — 80048 BASIC METABOLIC PNL TOTAL CA: CPT

## 2024-06-13 PROCEDURE — 82948 REAGENT STRIP/BLOOD GLUCOSE: CPT

## 2024-06-13 PROCEDURE — 6370000000 HC RX 637 (ALT 250 FOR IP): Performed by: PHYSICIAN ASSISTANT

## 2024-06-13 PROCEDURE — 99232 SBSQ HOSP IP/OBS MODERATE 35: CPT | Performed by: PHYSICIAN ASSISTANT

## 2024-06-13 PROCEDURE — 6360000002 HC RX W HCPCS: Performed by: PHYSICIAN ASSISTANT

## 2024-06-13 PROCEDURE — 1200000003 HC TELEMETRY R&B

## 2024-06-13 RX ORDER — BUMETANIDE 1 MG/1
2 TABLET ORAL DAILY
Status: DISCONTINUED | OUTPATIENT
Start: 2024-06-13 | End: 2024-06-14 | Stop reason: HOSPADM

## 2024-06-13 RX ADMIN — SODIUM CHLORIDE, PRESERVATIVE FREE 10 ML: 5 INJECTION INTRAVENOUS at 08:57

## 2024-06-13 RX ADMIN — SODIUM CHLORIDE, PRESERVATIVE FREE 10 ML: 5 INJECTION INTRAVENOUS at 20:17

## 2024-06-13 RX ADMIN — ASPIRIN 81 MG 81 MG: 81 TABLET ORAL at 08:56

## 2024-06-13 RX ADMIN — PRAVASTATIN SODIUM 20 MG: 20 TABLET ORAL at 20:17

## 2024-06-13 RX ADMIN — ALLOPURINOL 200 MG: 100 TABLET ORAL at 08:56

## 2024-06-13 RX ADMIN — CINACALCET HYDROCHLORIDE 30 MG: 30 TABLET, FILM COATED ORAL at 08:56

## 2024-06-13 RX ADMIN — ENOXAPARIN SODIUM 40 MG: 100 INJECTION SUBCUTANEOUS at 05:53

## 2024-06-13 RX ADMIN — ENOXAPARIN SODIUM 40 MG: 100 INJECTION SUBCUTANEOUS at 13:47

## 2024-06-13 RX ADMIN — BUMETANIDE 2 MG: 1 TABLET ORAL at 08:56

## 2024-06-13 RX ADMIN — GLIPIZIDE 15 MG: 10 TABLET ORAL at 08:56

## 2024-06-13 RX ADMIN — INSULIN LISPRO 2 UNITS: 100 INJECTION, SOLUTION INTRAVENOUS; SUBCUTANEOUS at 08:56

## 2024-06-13 RX ADMIN — CINACALCET HYDROCHLORIDE 30 MG: 30 TABLET, FILM COATED ORAL at 20:17

## 2024-06-13 NOTE — PROGRESS NOTES
Hospitalist Progress Note      Patient:  Clem Arizmendi 68 y.o. 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  Creatinine 2.7 on admit- improved to 1.8  BMP daily   Suspected prerenal due to volume depletion  On gentle fluids- sodium bicarb- stop today   Echo noncontributory.  LVEF 55% no diastolic dysfunction.  Recommend repeat echo in 6 to 12 months  Hold cozaar- resume bumex today  Renal US with with left kidney lesion- recommendations for CT contrast or MRI- defer currently due to LISA - if no improvement in Cr- may consider inpatient MRI     Type 2 diabetes mellitus  Home metformin held.  Continue glipizide  Medium dose sliding scale, hypoglycemia protocol, POCT ACHS    PseudoHyponatremia  Currently 131.  Continue gentle IV fluids.  BMP daily  Serum osmo normal     Hypertension  Hold Cozaar given LISA.  Resume bumex today   Hydralazine as needed for SBP greater than 170    Resolved Problems  N/A   Chronic Conditions (reviewed and stable unless otherwise stated)  NNAMDI on CPAP  Primary Hyperparathyroidism  CAD with hyperlipidemia      LDA []None  Antibiotics: none  Steroids: none  Labs (still needed?)  [x]Yes nNo  IVF (still needed?)  [x]Yes nNo    Level of care []Step Down [x]Med-Surg  Bed Status [x]Inpatient Observation  Telemetry []Yes No  PT/OT []No    DVT Prophylaxis[] Lovenox   Expected discharge date:  unknown   Disposition: home   Code status: Full Code     ===================================================================    Chief Complaint: LISA on CKD     HPI: Patient presents to the ED with flu like symptoms for the past two days.  Patient reports headaches, myalgias, nausea and vomiting, no diarrhea.  The patient has had decreased oral intake as well.  The patient still

## 2024-06-13 NOTE — PROGRESS NOTES
Renal Progress Note    Pt Name: Clem Arizmendi  MRN: [id number]  [id number]  YOB: 1955  Admit Date: 6/10/2024  8:39 PM  Date of evaluation: 6/13/2024  Primary Care Physician: Michael Foy MD   6K-11/011-A       Subjective:     Interval History:  No distress  Good urine output     Diet: ADULT DIET; Regular; Low Fat/Low Chol/High Fiber/2 gm Na; Low Sodium (2 gm); Low Potassium (Less than 3000 mg/day); Low Phosphorus (Less than 1000 mg)    Medications:   Scheduled Meds:   bumetanide  2 mg Oral Daily    sodium chloride flush  5-40 mL IntraVENous 2 times per day    enoxaparin  40 mg SubCUTAneous Q12H    allopurinol  200 mg Oral Daily    aspirin  81 mg Oral Daily    cinacalcet  30 mg Oral BID    Finerenone  10 mg Oral Daily    glipiZIDE  15 mg Oral QAM    pravastatin  20 mg Oral Nightly    insulin lispro  0-8 Units SubCUTAneous TID WC    insulin lispro  0-4 Units SubCUTAneous Nightly     Continuous Infusions:   sodium chloride      dextrose         Objective:   Vitals:   BP (!) 140/86   Pulse 56   Temp 98.4 °F (36.9 °C) (Oral)   Resp 16   Ht 1.803 m (5' 11\")   Wt (!) 156.7 kg (345 lb 8 oz)   SpO2 98%   BMI 48.19 kg/m²     I&O's:    Intake/Output Summary (Last 24 hours) at 6/13/2024 1556  Last data filed at 6/13/2024 1351  Gross per 24 hour   Intake 990 ml   Output 4800 ml   Net -3810 ml     I/O last 3 completed shifts:  In: 1720 [P.O.:1720]  Out: 7325 [Urine:7325]   Date 06/13/24 0000 - 06/13/24 2359   Shift 2610-1751 2333-1629 2414-1936 24 Hour Total   INTAKE   P.O.(mL/kg/hr) 600(0.5) 120  720   Shift Total(mL/kg) 600(3.8) 120(0.8)  720(4.6)   OUTPUT   Urine(mL/kg/hr) 1300(1) 2500  3800   Shift Total(mL/kg) 1300(8.3) 2500(16)  3800(24.2)   Weight (kg) 156.7 156.7 156.7 156.7       General appearance: no distress  HEENT: PERRLA, EOMI, NON ICTERIC  Neck: NO LAD, NO THYROMEGALY  Lungs: CLEAR TO AUSCULTATION BILATERALLY  Heart: S1 S2 NORMAL, REGULAR RATE AND RHYTHM, NO AUDIBLE MURMURS  Abdomen:  SOFT, NON TENDER, NON DISTENDED, NO ORGANOMEGALY FELT, BOWEL SOUNDS NORMAL  Extremities: NO EDEMA  Neurologic: GROSSLY NON FOCAL  Skin: NO RASHES  Hematology: NO BLEEDING, NO BRUISING  Genitourinary: external normal     LABS:    CBC:   Recent Labs     06/10/24  2124   WBC 12.3*   HGB 14.0        BMP:  Recent Labs     06/11/24  0921 06/12/24  0606 06/13/24  0608   * 131* 133*   K 4.1 4.1 3.9   CL 96* 98 98   CO2 21* 20* 20*   BUN 33* 30* 29*   CREATININE 2.5* 1.9* 1.8*   GLUCOSE 253* 197* 210*   CALCIUM 10.2 10.4 10.8*     TSH: No results for input(s): \"TSH\" in the last 72 hours.  HgBa1c: No results for input(s): \"LABA1C\" in the last 72 hours.  Hepatic:   Recent Labs     06/10/24  2124   AST 17   ALT 22   BILITOT 0.8   ALKPHOS 90     Troponin: No results for input(s): \"TROPONINI\" in the last 72 hours.  BNP: No results for input(s): \"BNP\" in the last 72 hours.  Lipids: No results for input(s): \"CHOL\", \"HDL\" in the last 72 hours.    Invalid input(s): \"LDLCALCU\"  INR: No results for input(s): \"INR\" in the last 72 hours.    Images and Other:  reviewed      Assessment and Plan:       1, LISA on CKD stage 3B - improving   2, Acidosis  3, Urinary hesitancy  4, Proteinuria - 1.6 g on urine spot check   5, Suspicious renal lesion     Plan  D/c IV fluids   Start oral bumex   Will need further imaging and urology evaluation for renal lesion - if renal function continues to improve can be done outpatient with very close follow up. Patient educated. Verbalized understanding.  Strict I/Os  Avoid nephrotoxins   Renally dose all medications.    Electronically signed by Nguyen Nava MD on 6/13/2024 at 3:56 PM

## 2024-06-13 NOTE — PLAN OF CARE
Problem: Discharge Planning  Goal: Discharge to home or other facility with appropriate resources  Outcome: Progressing     Problem: Chronic Conditions and Co-morbidities  Goal: Patient's chronic conditions and co-morbidity symptoms are monitored and maintained or improved  Outcome: Progressing     Problem: Safety - Adult  Goal: Free from fall injury  Outcome: Progressing     Problem: Respiratory - Adult  Goal: Achieves optimal ventilation and oxygenation  Outcome: Progressing     Problem: Metabolic/Fluid and Electrolytes - Adult  Goal: Hemodynamic stability and optimal renal function maintained  Outcome: Progressing

## 2024-06-13 NOTE — CARE COORDINATION
6/13/24, 8:42 AM EDT    DISCHARGE ON GOING EVALUATION    Clem Arizmendi       [facility] day: 2  Location: [facility]11/011-A Reason for admit: Body aches [R52]  LISA (acute kidney injury) (HCC) [N17.9]  Nonintractable headache, unspecified chronicity pattern, unspecified headache type [R51.9]     Procedures: none    Imaging since last note: none    Barriers to Discharge: creat 1.8, Ca+10.8. IVF stopped. PO Bumex resumed. Discharge when okay with nephro.     PCP: Michael Foy MD  Readmission Risk Score: 10.3    Patient Goals/Plan/Treatment Preferences: Met with Clem, antonio plans home with wife. He denies needs.      6/14/24, 11:02 AM EDT    Patient goals/plan/ treatment preferences discussed by  and .  Patient goals/plan/ treatment preferences reviewed with patient/ family.  Patient/ family verbalize understanding of discharge plan and are in agreement with goal/plan/treatment preferences.  Understanding was demonstrated using the teach back method.  AVS provided by RN at time of discharge, which includes all necessary medical information pertaining to the patients current course of illness, treatment, post-discharge goals of care, and treatment preferences.     Services At/After Discharge: None

## 2024-06-14 VITALS
SYSTOLIC BLOOD PRESSURE: 126 MMHG | TEMPERATURE: 98.1 F | BODY MASS INDEX: 44.1 KG/M2 | HEIGHT: 71 IN | DIASTOLIC BLOOD PRESSURE: 83 MMHG | OXYGEN SATURATION: 97 % | HEART RATE: 53 BPM | RESPIRATION RATE: 16 BRPM | WEIGHT: 315 LBS

## 2024-06-14 LAB
ANION GAP SERPL CALC-SCNC: 12 MEQ/L (ref 8–16)
BUN SERPL-MCNC: 32 MG/DL (ref 7–22)
CALCIUM SERPL-MCNC: 10.6 MG/DL (ref 8.5–10.5)
CHLORIDE SERPL-SCNC: 100 MEQ/L (ref 98–111)
CO2 SERPL-SCNC: 23 MEQ/L (ref 23–33)
CREAT SERPL-MCNC: 1.8 MG/DL (ref 0.4–1.2)
GFR SERPL CREATININE-BSD FRML MDRD: 41 ML/MIN/1.73M2
GLUCOSE BLD STRIP.AUTO-MCNC: 143 MG/DL (ref 70–108)
GLUCOSE BLD STRIP.AUTO-MCNC: 201 MG/DL (ref 70–108)
GLUCOSE SERPL-MCNC: 217 MG/DL (ref 70–108)
POTASSIUM SERPL-SCNC: 3.6 MEQ/L (ref 3.5–5.2)
SODIUM SERPL-SCNC: 135 MEQ/L (ref 135–145)

## 2024-06-14 PROCEDURE — 36415 COLL VENOUS BLD VENIPUNCTURE: CPT

## 2024-06-14 PROCEDURE — 2580000003 HC RX 258: Performed by: PHYSICIAN ASSISTANT

## 2024-06-14 PROCEDURE — 80048 BASIC METABOLIC PNL TOTAL CA: CPT

## 2024-06-14 PROCEDURE — 82948 REAGENT STRIP/BLOOD GLUCOSE: CPT

## 2024-06-14 PROCEDURE — 6370000000 HC RX 637 (ALT 250 FOR IP): Performed by: INTERNAL MEDICINE

## 2024-06-14 PROCEDURE — 99239 HOSP IP/OBS DSCHRG MGMT >30: CPT | Performed by: PHYSICIAN ASSISTANT

## 2024-06-14 PROCEDURE — 6370000000 HC RX 637 (ALT 250 FOR IP): Performed by: PHYSICIAN ASSISTANT

## 2024-06-14 PROCEDURE — 6360000002 HC RX W HCPCS: Performed by: PHYSICIAN ASSISTANT

## 2024-06-14 RX ADMIN — INSULIN LISPRO 2 UNITS: 100 INJECTION, SOLUTION INTRAVENOUS; SUBCUTANEOUS at 08:27

## 2024-06-14 RX ADMIN — ALLOPURINOL 200 MG: 100 TABLET ORAL at 08:27

## 2024-06-14 RX ADMIN — SODIUM CHLORIDE, PRESERVATIVE FREE 10 ML: 5 INJECTION INTRAVENOUS at 08:27

## 2024-06-14 RX ADMIN — BUMETANIDE 2 MG: 1 TABLET ORAL at 08:27

## 2024-06-14 RX ADMIN — ASPIRIN 81 MG 81 MG: 81 TABLET ORAL at 08:27

## 2024-06-14 RX ADMIN — ENOXAPARIN SODIUM 40 MG: 100 INJECTION SUBCUTANEOUS at 04:39

## 2024-06-14 RX ADMIN — CINACALCET HYDROCHLORIDE 30 MG: 30 TABLET, FILM COATED ORAL at 08:26

## 2024-06-14 RX ADMIN — GLIPIZIDE 15 MG: 10 TABLET ORAL at 08:26

## 2024-06-14 NOTE — DISCHARGE SUMMARY
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 outpatient- nephrology in agreement with discharge   Baseline creatinine 1.6.  Creatinine 2.7 on admit- improved to 1.8 and stable with resumption of bumex   BMP 1 week prior to outpatient follow-up appointment  Suspected prerenal due to volume depletion-responded well to IV fluids  Echo noncontributory.  LVEF 55% no diastolic dysfunction.  Recommend repeat echo in 6 to 12 months-defer to PCP  Renal US with with left kidney lesion- recommendations for CT contrast or MRI- defer currently due to LISA.  Follow-up with nephrology outpatient for further evaluation    Type 2 diabetes mellitus  Resume home meds discharge    PseudoHyponatremia-stable    Hypertension  Stop losartan. Continue bumex       The patient was seen and examined on day of discharge and this discharge summary is in conjunction with any daily progress note from day of discharge.    Hospital Course:   Clem Arizmendi is a 68 y.o. male past medical history membranous glomerulonephritis with nephrosis, type 2 diabetes mellitus, hypertension who was admitted to [facility] on 6/10/2024 for acute kidney injury.  Patient with flulike symptoms's for several days prior to arrival-headaches, myalgias, nausea and vomiting.    Patient was treated with IV duration with sodium bicarb and holding Bumex and Cozaar.  Patient had improvement in his kidney function.  Renal ultrasound was performed which demonstrated left kidney lesion with recommendations

## 2024-06-14 NOTE — PROGRESS NOTES
Renal Progress Note    Pt Name: Clem Arizmendi  MRN: [id number]  [id number]  YOB: 1955  Admit Date: 6/10/2024  8:39 PM  Date of evaluation: 6/14/2024  Primary Care Physician: Michael Foy MD   6K-11/011-A       Subjective:     Interval History: ***    Diet: ADULT DIET; Regular; Low Fat/Low Chol/High Fiber/2 gm Na; Low Sodium (2 gm); Low Potassium (Less than 3000 mg/day); Low Phosphorus (Less than 1000 mg)    Medications:   Scheduled Meds:   bumetanide  2 mg Oral Daily    sodium chloride flush  5-40 mL IntraVENous 2 times per day    enoxaparin  40 mg SubCUTAneous Q12H    allopurinol  200 mg Oral Daily    aspirin  81 mg Oral Daily    cinacalcet  30 mg Oral BID    Finerenone  10 mg Oral Daily    glipiZIDE  15 mg Oral QAM    pravastatin  20 mg Oral Nightly    insulin lispro  0-8 Units SubCUTAneous TID WC    insulin lispro  0-4 Units SubCUTAneous Nightly     Continuous Infusions:   sodium chloride      dextrose         Objective:   Vitals:   /79   Pulse 54   Temp 98.1 °F (36.7 °C) (Oral)   Resp 18   Ht 1.803 m (5' 11\")   Wt (!) 156.7 kg (345 lb 8 oz)   SpO2 98%   BMI 48.19 kg/m²     I&O's:    Intake/Output Summary (Last 24 hours) at 6/14/2024 1019  Last data filed at 6/14/2024 0827  Gross per 24 hour   Intake 1150 ml   Output 3300 ml   Net -2150 ml     I/O last 3 completed shifts:  In: 1890 [P.O.:1890]  Out: 5800 [Urine:5800]   Date 06/14/24 0000 - 06/14/24 2359   Shift 9560-0776 7678-5081 7340-8281 24 Hour Total   INTAKE   I.V.(mL/kg)  10(0.1)  10(0.1)   Shift Total(mL/kg)  10(0.1)  10(0.1)   OUTPUT   Urine(mL/kg/hr) 750(0.6)   750   Shift Total(mL/kg) 750(4.8)   750(4.8)   Weight (kg) 156.7 156.7 156.7 156.7       General appearance: ***  HEENT: PERRLA, EOMI, NON ICTERIC  Neck: NO LAD, NO THYROMEGALY  Lungs: CLEAR TO AUSCULTATION BILATERALLY  Heart: S1 S2 NORMAL, REGULAR RATE AND RHYTHM, NO AUDIBLE MURMURS  Abdomen: SOFT, NON TENDER, NON DISTENDED, NO ORGANOMEGALY FELT, BOWEL SOUNDS

## 2024-06-14 NOTE — DISCHARGE INSTRUCTIONS
You are diagnosed with acute kidney injury suspected due to viral illness prior to arrival.  You were treated with IV fluids and her kidneys improved.    Renal ultrasound was performed and demonstrated a lesion on your left kidney that we will need further evaluation.  Please discuss this with Dr. Robb at your follow-up on 6/25.    Get an echocardiogram done to evaluate your heart function.  Your heart function was at 55% which is in the low normal range.  Recommend we discussed this with your primary care provider and proceed with a repeat echocardiogram in 6 to 12 months.    Resume taking your home medications as previously prescribed with the exception of your losartan.  Please complete lab test on Monday per  Dr. Robb

## 2024-06-14 NOTE — PROGRESS NOTES
Discharge teaching and instructions for diagnosis/procedure of LISA completed with patient using teachback method. AVS reviewed. Printed prescriptions given to patient. Patient voiced understanding regarding prescriptions, follow up appointments, and care of self at home. Discharged ambulatory to  independent living per family

## 2024-06-17 ENCOUNTER — HOSPITAL ENCOUNTER (OUTPATIENT)
Age: 69
Discharge: HOME | End: 2024-06-17
Payer: MEDICARE

## 2024-06-17 DIAGNOSIS — N17.9 AKI (ACUTE KIDNEY INJURY) (HCC): ICD-10-CM

## 2024-06-17 LAB
ANION GAP SERPL CALC-SCNC: 15 MEQ/L (ref 8–16)
BUN SERPL-MCNC: 30 MG/DL (ref 7–22)
CALCIUM SERPL-MCNC: 9.9 MG/DL (ref 8.5–10.5)
CHLORIDE SERPL-SCNC: 101 MEQ/L (ref 98–111)
CO2 SERPL-SCNC: 23 MEQ/L (ref 23–33)
CREAT SERPL-MCNC: 1.7 MG/DL (ref 0.4–1.2)
GFR SERPL CREATININE-BSD FRML MDRD: 43 ML/MIN/1.73M2
GLUCOSE SERPL-MCNC: 183 MG/DL (ref 70–108)
POTASSIUM SERPL-SCNC: 3.7 MEQ/L (ref 3.5–5.2)
SODIUM SERPL-SCNC: 139 MEQ/L (ref 135–145)

## 2024-06-17 PROCEDURE — 36415 COLL VENOUS BLD VENIPUNCTURE: CPT

## 2024-06-17 PROCEDURE — 80048 BASIC METABOLIC PNL TOTAL CA: CPT

## 2024-06-20 ENCOUNTER — HOSPITAL ENCOUNTER (OUTPATIENT)
Age: 69
Discharge: HOME | End: 2024-06-20
Payer: MEDICARE

## 2024-06-20 LAB
25(OH)D3 SERPL-MCNC: 51 NG/ML (ref 30–100)
ALBUMIN SERPL BCG-MCNC: 3.9 G/DL (ref 3.5–5.1)
ALP SERPL-CCNC: 99 U/L (ref 38–126)
ALT SERPL W/O P-5'-P-CCNC: 35 U/L (ref 11–66)
ANION GAP SERPL CALC-SCNC: 12 MEQ/L (ref 8–16)
AST SERPL-CCNC: 32 U/L (ref 5–40)
BACTERIA URNS QL MICRO: ABNORMAL /HPF
BASOPHILS ABSOLUTE: 0.1 THOU/MM3 (ref 0–0.1)
BASOPHILS NFR BLD AUTO: 0.9 %
BILIRUB SERPL-MCNC: 0.4 MG/DL (ref 0.3–1.2)
BILIRUB UR QL STRIP.AUTO: NEGATIVE
BUN SERPL-MCNC: 29 MG/DL (ref 7–22)
CALCIUM SERPL-MCNC: 11 MG/DL (ref 8.5–10.5)
CASTS #/AREA URNS LPF: ABNORMAL /LPF
CASTS 2: ABNORMAL /LPF
CHARACTER UR: CLEAR
CHLORIDE SERPL-SCNC: 99 MEQ/L (ref 98–111)
CO2 SERPL-SCNC: 25 MEQ/L (ref 23–33)
COLOR: YELLOW
CREAT SERPL-MCNC: 1.8 MG/DL (ref 0.4–1.2)
CREAT UR-MCNC: 30.7 MG/DL
CREAT UR-MCNC: 31.5 MG/DL
CRYSTALS URNS MICRO: ABNORMAL
DEPRECATED MEAN GLUCOSE BLD GHB EST-ACNC: 174 MG/DL (ref 70–126)
DEPRECATED RDW RBC AUTO: 43.4 FL (ref 35–45)
EOSINOPHIL NFR BLD AUTO: 3.3 %
EOSINOPHILS ABSOLUTE: 0.4 THOU/MM3 (ref 0–0.4)
EPITHELIAL CELLS, UA: ABNORMAL /HPF
ERYTHROCYTE [DISTWIDTH] IN BLOOD BY AUTOMATED COUNT: 13.3 % (ref 11.5–14.5)
GFR SERPL CREATININE-BSD FRML MDRD: 41 ML/MIN/1.73M2
GLUCOSE SERPL-MCNC: 128 MG/DL (ref 70–108)
GLUCOSE UR QL STRIP.AUTO: NEGATIVE MG/DL
HBA1C MFR BLD HPLC: 7.8 % (ref 4.4–6.4)
HCT VFR BLD AUTO: 42.9 % (ref 42–52)
HGB BLD-MCNC: 13.9 GM/DL (ref 14–18)
HGB UR QL STRIP.AUTO: NEGATIVE
IMM GRANULOCYTES # BLD AUTO: 0.42 THOU/MM3 (ref 0–0.07)
IMM GRANULOCYTES NFR BLD AUTO: 3.8 %
IONIZED CALCIUM, WHOLE BLOOD: 1.4 MMOL/L (ref 1.12–1.32)
KETONES UR QL STRIP.AUTO: NEGATIVE
LYMPHOCYTES ABSOLUTE: 2.4 THOU/MM3 (ref 1–4.8)
LYMPHOCYTES NFR BLD AUTO: 21.5 %
MAGNESIUM SERPL-MCNC: 1.9 MG/DL (ref 1.6–2.4)
MCH RBC QN AUTO: 28.8 PG (ref 26–33)
MCHC RBC AUTO-ENTMCNC: 32.4 GM/DL (ref 32.2–35.5)
MCV RBC AUTO: 89 FL (ref 80–94)
MICROALBUMIN UR-MCNC: 23.04 MG/DL
MICROALBUMIN/CREAT RATIO PNL UR: 731 MG/G (ref 0–30)
MISCELLANEOUS 2: ABNORMAL
MONOCYTES ABSOLUTE: 0.7 THOU/MM3 (ref 0.4–1.3)
MONOCYTES NFR BLD AUTO: 6.2 %
NEUTROPHILS ABSOLUTE: 7.1 THOU/MM3 (ref 1.8–7.7)
NEUTROPHILS NFR BLD AUTO: 64.3 %
NITRITE UR QL STRIP: NEGATIVE
NRBC BLD AUTO-RTO: 0 /100 WBC
PH UR STRIP.AUTO: 6 [PH] (ref 5–9)
PLATELET # BLD AUTO: 399 THOU/MM3 (ref 130–400)
PMV BLD AUTO: 9.6 FL (ref 9.4–12.4)
POTASSIUM SERPL-SCNC: 3.8 MEQ/L (ref 3.5–5.2)
PROT SERPL-MCNC: 7.4 G/DL (ref 6.1–8)
PROT UR STRIP.AUTO-MCNC: 30 MG/DL
PROT UR-MCNC: 35.5 MG/DL
PROT/CREAT 24H UR: 1.16 MG/G{CREAT}
PTH-INTACT SERPL-MCNC: 61.4 PG/ML (ref 15–65)
RBC # BLD AUTO: 4.82 MILL/MM3 (ref 4.7–6.1)
RBC URINE: ABNORMAL /HPF
RENAL EPI CELLS #/AREA URNS HPF: ABNORMAL /[HPF]
SODIUM SERPL-SCNC: 136 MEQ/L (ref 135–145)
SP GR UR REFRACT.AUTO: 1.01 (ref 1–1.03)
URATE SERPL-MCNC: 8.3 MG/DL (ref 3.7–7)
UROBILINOGEN, URINE: 0.2 EU/DL (ref 0–1)
WBC # BLD AUTO: 11.1 THOU/MM3 (ref 4.8–10.8)
WBC #/AREA URNS HPF: ABNORMAL /HPF
WBC #/AREA URNS HPF: ABNORMAL /[HPF]
YEAST LIKE FUNGI URNS QL MICRO: ABNORMAL

## 2024-06-20 PROCEDURE — 83970 ASSAY OF PARATHORMONE: CPT

## 2024-06-20 PROCEDURE — 85025 COMPLETE CBC W/AUTO DIFF WBC: CPT

## 2024-06-20 PROCEDURE — 81001 URINALYSIS AUTO W/SCOPE: CPT

## 2024-06-20 PROCEDURE — 82330 ASSAY OF CALCIUM: CPT

## 2024-06-20 PROCEDURE — 84156 ASSAY OF PROTEIN URINE: CPT

## 2024-06-20 PROCEDURE — 84550 ASSAY OF BLOOD/URIC ACID: CPT

## 2024-06-20 PROCEDURE — 82570 ASSAY OF URINE CREATININE: CPT

## 2024-06-20 PROCEDURE — 82306 VITAMIN D 25 HYDROXY: CPT

## 2024-06-20 PROCEDURE — 36415 COLL VENOUS BLD VENIPUNCTURE: CPT

## 2024-06-20 PROCEDURE — 80053 COMPREHEN METABOLIC PANEL: CPT

## 2024-06-20 PROCEDURE — 82043 UR ALBUMIN QUANTITATIVE: CPT

## 2024-06-20 PROCEDURE — 83036 HEMOGLOBIN GLYCOSYLATED A1C: CPT

## 2024-06-20 PROCEDURE — 83735 ASSAY OF MAGNESIUM: CPT

## 2024-07-18 ENCOUNTER — HOSPITAL ENCOUNTER (OUTPATIENT)
Age: 69
Discharge: HOME | End: 2024-07-18
Payer: MEDICARE

## 2024-07-18 LAB
ANION GAP SERPL CALC-SCNC: 11 MEQ/L (ref 8–16)
BACTERIA URNS QL MICRO: ABNORMAL /HPF
BASOPHILS ABSOLUTE: 0.1 THOU/MM3 (ref 0–0.1)
BASOPHILS NFR BLD AUTO: 0.9 %
BILIRUB UR QL STRIP.AUTO: NEGATIVE
BUN SERPL-MCNC: 28 MG/DL (ref 7–22)
CALCIUM SERPL-MCNC: 10.1 MG/DL (ref 8.5–10.5)
CASTS #/AREA URNS LPF: ABNORMAL /LPF
CASTS 2: ABNORMAL /LPF
CHARACTER UR: CLEAR
CHLORIDE SERPL-SCNC: 99 MEQ/L (ref 98–111)
CO2 SERPL-SCNC: 26 MEQ/L (ref 23–33)
COLOR, UA: YELLOW
CREAT SERPL-MCNC: 1.6 MG/DL (ref 0.4–1.2)
CREAT UR-MCNC: 30.6 MG/DL
CRYSTALS URNS MICRO: ABNORMAL
DEPRECATED RDW RBC AUTO: 46.1 FL (ref 35–45)
EOSINOPHIL NFR BLD AUTO: 4.3 %
EOSINOPHILS ABSOLUTE: 0.4 THOU/MM3 (ref 0–0.4)
EPITHELIAL CELLS, UA: ABNORMAL /HPF
ERYTHROCYTE [DISTWIDTH] IN BLOOD BY AUTOMATED COUNT: 14.1 % (ref 11.5–14.5)
GFR SERPL CREATININE-BSD FRML MDRD: 47 ML/MIN/1.73M2
GLUCOSE SERPL-MCNC: 165 MG/DL (ref 70–108)
GLUCOSE UR QL STRIP.AUTO: NEGATIVE MG/DL
HCT VFR BLD AUTO: 45.4 % (ref 42–52)
HGB BLD-MCNC: 14.7 GM/DL (ref 14–18)
HGB UR QL STRIP.AUTO: NEGATIVE
IMM GRANULOCYTES # BLD AUTO: 0.1 THOU/MM3 (ref 0–0.07)
IMM GRANULOCYTES NFR BLD AUTO: 1 %
IONIZED CALCIUM, WHOLE BLOOD: 1.25 MMOL/L (ref 1.12–1.32)
KETONES UR QL STRIP.AUTO: NEGATIVE
LYMPHOCYTES ABSOLUTE: 1.8 THOU/MM3 (ref 1–4.8)
LYMPHOCYTES NFR BLD AUTO: 18.9 %
MCH RBC QN AUTO: 29 PG (ref 26–33)
MCHC RBC AUTO-ENTMCNC: 32.4 GM/DL (ref 32.2–35.5)
MCV RBC AUTO: 89.5 FL (ref 80–94)
MISCELLANEOUS 2: ABNORMAL
MONOCYTES ABSOLUTE: 0.7 THOU/MM3 (ref 0.4–1.3)
MONOCYTES NFR BLD AUTO: 7.8 %
NEUTROPHILS ABSOLUTE: 6.4 THOU/MM3 (ref 1.8–7.7)
NEUTROPHILS NFR BLD AUTO: 67.1 %
NITRITE UR QL STRIP: NEGATIVE
NRBC BLD AUTO-RTO: 0 /100 WBC
PH UR STRIP.AUTO: 6.5 [PH] (ref 5–9)
PLATELET # BLD AUTO: 250 THOU/MM3 (ref 130–400)
PMV BLD AUTO: 10.9 FL (ref 9.4–12.4)
POTASSIUM SERPL-SCNC: 4 MEQ/L (ref 3.5–5.2)
PROT UR STRIP.AUTO-MCNC: 100 MG/DL
PROT UR-MCNC: 54.2 MG/DL
PROT/CREAT 24H UR: 1.77 MG/G{CREAT}
PTH-INTACT SERPL-MCNC: 82.2 PG/ML (ref 15–65)
RBC # BLD AUTO: 5.07 MILL/MM3 (ref 4.7–6.1)
RBC URINE: ABNORMAL /HPF
RENAL EPI CELLS #/AREA URNS HPF: ABNORMAL /[HPF]
SODIUM SERPL-SCNC: 136 MEQ/L (ref 135–145)
SP GR UR REFRACT.AUTO: 1.01 (ref 1–1.03)
UROBILINOGEN, URINE: 0.2 EU/DL (ref 0–1)
WBC # BLD AUTO: 9.6 THOU/MM3 (ref 4.8–10.8)
WBC #/AREA URNS HPF: ABNORMAL /HPF
WBC #/AREA URNS HPF: ABNORMAL /[HPF]
YEAST LIKE FUNGI URNS QL MICRO: ABNORMAL

## 2024-07-18 PROCEDURE — 81001 URINALYSIS AUTO W/SCOPE: CPT

## 2024-07-18 PROCEDURE — 82330 ASSAY OF CALCIUM: CPT

## 2024-07-18 PROCEDURE — 84156 ASSAY OF PROTEIN URINE: CPT

## 2024-07-18 PROCEDURE — 85025 COMPLETE CBC W/AUTO DIFF WBC: CPT

## 2024-07-18 PROCEDURE — 83970 ASSAY OF PARATHORMONE: CPT

## 2024-07-18 PROCEDURE — 82570 ASSAY OF URINE CREATININE: CPT

## 2024-07-18 PROCEDURE — 80048 BASIC METABOLIC PNL TOTAL CA: CPT

## 2024-07-18 PROCEDURE — 36415 COLL VENOUS BLD VENIPUNCTURE: CPT

## 2024-07-18 PROCEDURE — 87086 URINE CULTURE/COLONY COUNT: CPT

## 2024-07-20 LAB
BACTERIA UR CULT: ABNORMAL
ORGANISM: ABNORMAL

## 2024-08-01 ENCOUNTER — HOSPITAL ENCOUNTER (OUTPATIENT)
Age: 69
Discharge: HOME | End: 2024-08-01
Payer: MEDICARE

## 2024-08-01 LAB
ANION GAP SERPL CALC-SCNC: 13 MEQ/L (ref 8–16)
BUN SERPL-MCNC: 38 MG/DL (ref 7–22)
CALCIUM SERPL-MCNC: 10.7 MG/DL (ref 8.5–10.5)
CHLORIDE SERPL-SCNC: 101 MEQ/L (ref 98–111)
CO2 SERPL-SCNC: 22 MEQ/L (ref 23–33)
CREAT SERPL-MCNC: 1.7 MG/DL (ref 0.4–1.2)
GFR SERPL CREATININE-BSD FRML MDRD: 43 ML/MIN/1.73M2
GLUCOSE SERPL-MCNC: 163 MG/DL (ref 70–108)
POTASSIUM SERPL-SCNC: 4.3 MEQ/L (ref 3.5–5.2)
SODIUM SERPL-SCNC: 136 MEQ/L (ref 135–145)

## 2024-08-01 PROCEDURE — 80048 BASIC METABOLIC PNL TOTAL CA: CPT

## 2024-08-01 PROCEDURE — 36415 COLL VENOUS BLD VENIPUNCTURE: CPT

## 2024-08-12 ENCOUNTER — HOSPITAL ENCOUNTER (OUTPATIENT)
Age: 69
Discharge: HOME | End: 2024-08-12
Payer: MEDICARE

## 2024-08-12 LAB
ANION GAP SERPL CALC-SCNC: 16 MEQ/L (ref 8–16)
BUN SERPL-MCNC: 29 MG/DL (ref 7–22)
CALCIUM SERPL-MCNC: 10.4 MG/DL (ref 8.5–10.5)
CHLORIDE SERPL-SCNC: 102 MEQ/L (ref 98–111)
CO2 SERPL-SCNC: 21 MEQ/L (ref 23–33)
CREAT SERPL-MCNC: 1.7 MG/DL (ref 0.4–1.2)
GFR SERPL CREATININE-BSD FRML MDRD: 43 ML/MIN/1.73M2
GLUCOSE SERPL-MCNC: 125 MG/DL (ref 70–108)
POTASSIUM SERPL-SCNC: 4 MEQ/L (ref 3.5–5.2)
SODIUM SERPL-SCNC: 139 MEQ/L (ref 135–145)

## 2024-08-12 PROCEDURE — 80048 BASIC METABOLIC PNL TOTAL CA: CPT

## 2024-08-12 PROCEDURE — 36415 COLL VENOUS BLD VENIPUNCTURE: CPT

## 2024-08-23 ENCOUNTER — HOSPITAL ENCOUNTER (OUTPATIENT)
Age: 69
Discharge: HOME | End: 2024-08-23
Payer: MEDICARE

## 2024-08-23 LAB
ANION GAP SERPL CALC-SCNC: 11 MEQ/L (ref 8–16)
BUN SERPL-MCNC: 33 MG/DL (ref 7–22)
CALCIUM SERPL-MCNC: 10.5 MG/DL (ref 8.5–10.5)
CHLORIDE SERPL-SCNC: 98 MEQ/L (ref 98–111)
CO2 SERPL-SCNC: 25 MEQ/L (ref 23–33)
CREAT SERPL-MCNC: 2.3 MG/DL (ref 0.4–1.2)
GFR SERPL CREATININE-BSD FRML MDRD: 30 ML/MIN/1.73M2
GLUCOSE SERPL-MCNC: 155 MG/DL (ref 70–108)
IONIZED CALCIUM, WHOLE BLOOD: 1.37 MMOL/L (ref 1.12–1.32)
POTASSIUM SERPL-SCNC: 4.2 MEQ/L (ref 3.5–5.2)
SODIUM SERPL-SCNC: 134 MEQ/L (ref 135–145)

## 2024-08-23 PROCEDURE — 36415 COLL VENOUS BLD VENIPUNCTURE: CPT

## 2024-08-23 PROCEDURE — 82330 ASSAY OF CALCIUM: CPT

## 2024-08-23 PROCEDURE — 80048 BASIC METABOLIC PNL TOTAL CA: CPT

## 2024-09-11 ENCOUNTER — HOSPITAL ENCOUNTER (OUTPATIENT)
Age: 69
Discharge: HOME | End: 2024-09-11
Payer: MEDICARE

## 2024-09-11 LAB
ANION GAP SERPL CALC-SCNC: 12 MEQ/L (ref 8–16)
BUN SERPL-MCNC: 31 MG/DL (ref 7–22)
CALCIUM SERPL-MCNC: 10.4 MG/DL (ref 8.5–10.5)
CHLORIDE SERPL-SCNC: 101 MEQ/L (ref 98–111)
CO2 SERPL-SCNC: 23 MEQ/L (ref 23–33)
CREAT SERPL-MCNC: 1.6 MG/DL (ref 0.4–1.2)
GFR SERPL CREATININE-BSD FRML MDRD: 47 ML/MIN/1.73M2
GLUCOSE SERPL-MCNC: 168 MG/DL (ref 70–108)
IONIZED CALCIUM, WHOLE BLOOD: 1.34 MMOL/L (ref 1.12–1.32)
POTASSIUM SERPL-SCNC: 3.9 MEQ/L (ref 3.5–5.2)
SODIUM SERPL-SCNC: 136 MEQ/L (ref 135–145)

## 2024-09-11 PROCEDURE — 80048 BASIC METABOLIC PNL TOTAL CA: CPT

## 2024-09-11 PROCEDURE — 36415 COLL VENOUS BLD VENIPUNCTURE: CPT

## 2024-09-11 PROCEDURE — 82330 ASSAY OF CALCIUM: CPT

## 2024-09-28 ENCOUNTER — HOSPITAL ENCOUNTER (OUTPATIENT)
Age: 69
Discharge: HOME | End: 2024-09-28
Payer: MEDICARE

## 2024-09-28 LAB
ALBUMIN SERPL BCG-MCNC: 4.2 G/DL (ref 3.5–5.1)
ALP SERPL-CCNC: 96 U/L (ref 38–126)
ALT SERPL W/O P-5'-P-CCNC: 35 U/L (ref 11–66)
ANION GAP SERPL CALC-SCNC: 17 MEQ/L (ref 8–16)
AST SERPL-CCNC: 20 U/L (ref 5–40)
BILIRUB SERPL-MCNC: 0.5 MG/DL (ref 0.3–1.2)
BUN SERPL-MCNC: 41 MG/DL (ref 7–22)
CALCIUM SERPL-MCNC: 10.3 MG/DL (ref 8.5–10.5)
CHLORIDE SERPL-SCNC: 97 MEQ/L (ref 98–111)
CHOLEST SERPL-MCNC: 162 MG/DL (ref 100–199)
CO2 SERPL-SCNC: 22 MEQ/L (ref 23–33)
CREAT SERPL-MCNC: 1.8 MG/DL (ref 0.4–1.2)
DEPRECATED MEAN GLUCOSE BLD GHB EST-ACNC: 183 MG/DL (ref 70–126)
GFR SERPL CREATININE-BSD FRML MDRD: 41 ML/MIN/1.73M2
GLUCOSE SERPL-MCNC: 115 MG/DL (ref 70–108)
HBA1C MFR BLD HPLC: 8.1 % (ref 4.4–6.4)
HDLC SERPL-MCNC: 42 MG/DL
LDLC SERPL CALC-MCNC: 82 MG/DL
POTASSIUM SERPL-SCNC: 4.3 MEQ/L (ref 3.5–5.2)
PROT SERPL-MCNC: 7.1 G/DL (ref 6.1–8)
PSA SERPL-MCNC: 0.85 NG/ML (ref 0–1)
SODIUM SERPL-SCNC: 136 MEQ/L (ref 135–145)
TRIGL SERPL-MCNC: 189 MG/DL (ref 0–199)
URATE SERPL-MCNC: 7.1 MG/DL (ref 3.7–7)

## 2024-09-28 PROCEDURE — 36415 COLL VENOUS BLD VENIPUNCTURE: CPT

## 2024-09-28 PROCEDURE — 84153 ASSAY OF PSA TOTAL: CPT

## 2024-09-28 PROCEDURE — 83036 HEMOGLOBIN GLYCOSYLATED A1C: CPT

## 2024-09-28 PROCEDURE — 80053 COMPREHEN METABOLIC PANEL: CPT

## 2024-09-28 PROCEDURE — 80061 LIPID PANEL: CPT

## 2024-09-28 PROCEDURE — 84550 ASSAY OF BLOOD/URIC ACID: CPT

## 2024-10-23 ENCOUNTER — HOSPITAL ENCOUNTER (OUTPATIENT)
Age: 69
Discharge: HOME | End: 2024-10-23
Payer: MEDICARE

## 2024-10-23 LAB
25(OH)D3 SERPL-MCNC: 48 NG/ML (ref 30–100)
ANION GAP SERPL CALC-SCNC: 16 MEQ/L (ref 8–16)
BACTERIA URNS QL MICRO: ABNORMAL /HPF
BASOPHILS ABSOLUTE: 0.1 THOU/MM3 (ref 0–0.1)
BASOPHILS NFR BLD AUTO: 0.8 %
BILIRUB UR QL STRIP.AUTO: NEGATIVE
BUN SERPL-MCNC: 36 MG/DL (ref 7–22)
CALCIUM SERPL-MCNC: 10.3 MG/DL (ref 8.5–10.5)
CASTS #/AREA URNS LPF: ABNORMAL /LPF
CASTS 2: ABNORMAL /LPF
CHARACTER UR: CLEAR
CHLORIDE SERPL-SCNC: 100 MEQ/L (ref 98–111)
CO2 SERPL-SCNC: 23 MEQ/L (ref 23–33)
COLOR, UA: YELLOW
CREAT SERPL-MCNC: 1.7 MG/DL (ref 0.4–1.2)
CREAT UR-MCNC: 45.2 MG/DL
CREAT UR-MCNC: 45.8 MG/DL
CRYSTALS URNS MICRO: ABNORMAL
DEPRECATED RDW RBC AUTO: 43.7 FL (ref 35–45)
EOSINOPHIL NFR BLD AUTO: 3.3 %
EOSINOPHILS ABSOLUTE: 0.3 THOU/MM3 (ref 0–0.4)
EPITHELIAL CELLS, UA: ABNORMAL /HPF
ERYTHROCYTE [DISTWIDTH] IN BLOOD BY AUTOMATED COUNT: 13.6 % (ref 11.5–14.5)
GFR SERPL CREATININE-BSD FRML MDRD: 43 ML/MIN/1.73M2
GLUCOSE SERPL-MCNC: 154 MG/DL (ref 70–108)
GLUCOSE UR QL STRIP.AUTO: >= 1000 MG/DL
HCT VFR BLD AUTO: 48.4 % (ref 42–52)
HGB BLD-MCNC: 15.9 GM/DL (ref 14–18)
HGB UR QL STRIP.AUTO: NEGATIVE
IMM GRANULOCYTES # BLD AUTO: 0.1 THOU/MM3 (ref 0–0.07)
IMM GRANULOCYTES NFR BLD AUTO: 1.2 %
KETONES UR QL STRIP.AUTO: NEGATIVE
LYMPHOCYTES ABSOLUTE: 2.4 THOU/MM3 (ref 1–4.8)
LYMPHOCYTES NFR BLD AUTO: 28.3 %
MAGNESIUM SERPL-MCNC: 2.2 MG/DL (ref 1.6–2.4)
MCH RBC QN AUTO: 28.7 PG (ref 26–33)
MCHC RBC AUTO-ENTMCNC: 32.9 GM/DL (ref 32.2–35.5)
MCV RBC AUTO: 87.4 FL (ref 80–94)
MICROALBUMIN UR-MCNC: 63.29 MG/DL
MICROALBUMIN/CREAT RATIO PNL UR: 1400 MG/G (ref 0–30)
MISCELLANEOUS 2: ABNORMAL
MONOCYTES ABSOLUTE: 0.8 THOU/MM3 (ref 0.4–1.3)
MONOCYTES NFR BLD AUTO: 9.7 %
NEUTROPHILS ABSOLUTE: 4.8 THOU/MM3 (ref 1.8–7.7)
NEUTROPHILS NFR BLD AUTO: 56.7 %
NITRITE UR QL STRIP: NEGATIVE
NRBC BLD AUTO-RTO: 0 /100 WBC
PH UR STRIP.AUTO: 5.5 [PH] (ref 5–9)
PLATELET # BLD AUTO: 271 THOU/MM3 (ref 130–400)
PMV BLD AUTO: 10.7 FL (ref 9.4–12.4)
POTASSIUM SERPL-SCNC: 4.4 MEQ/L (ref 3.5–5.2)
PROT UR STRIP.AUTO-MCNC: 100 MG/DL
PROT UR-MCNC: 89.6 MG/DL
PROT/CREAT 24H UR: 1.96 MG/G{CREAT}
RBC # BLD AUTO: 5.54 MILL/MM3 (ref 4.7–6.1)
RBC URINE: ABNORMAL /HPF
RENAL EPI CELLS #/AREA URNS HPF: ABNORMAL /[HPF]
SODIUM SERPL-SCNC: 139 MEQ/L (ref 135–145)
SP GR UR REFRACT.AUTO: 1.01 (ref 1–1.03)
URATE SERPL-MCNC: 6.1 MG/DL (ref 3.7–7)
UROBILINOGEN, URINE: 0.2 EU/DL (ref 0–1)
WBC # BLD AUTO: 8.5 THOU/MM3 (ref 4.8–10.8)
WBC #/AREA URNS HPF: ABNORMAL /HPF
WBC #/AREA URNS HPF: NEGATIVE /[HPF]
YEAST LIKE FUNGI URNS QL MICRO: ABNORMAL

## 2024-10-23 PROCEDURE — 82306 VITAMIN D 25 HYDROXY: CPT

## 2024-10-23 PROCEDURE — 80048 BASIC METABOLIC PNL TOTAL CA: CPT

## 2024-10-23 PROCEDURE — 84156 ASSAY OF PROTEIN URINE: CPT

## 2024-10-23 PROCEDURE — 36415 COLL VENOUS BLD VENIPUNCTURE: CPT

## 2024-10-23 PROCEDURE — 82043 UR ALBUMIN QUANTITATIVE: CPT

## 2024-10-23 PROCEDURE — 83735 ASSAY OF MAGNESIUM: CPT

## 2024-10-23 PROCEDURE — 81001 URINALYSIS AUTO W/SCOPE: CPT

## 2024-10-23 PROCEDURE — 85025 COMPLETE CBC W/AUTO DIFF WBC: CPT

## 2024-10-23 PROCEDURE — 84550 ASSAY OF BLOOD/URIC ACID: CPT

## 2024-10-23 PROCEDURE — 82570 ASSAY OF URINE CREATININE: CPT

## 2024-11-05 ENCOUNTER — OFFICE VISIT (OUTPATIENT)
Dept: INTERNAL MEDICINE CLINIC | Age: 69
End: 2024-11-05
Payer: MEDICARE

## 2024-11-05 VITALS — BODY MASS INDEX: 44.1 KG/M2 | HEIGHT: 71 IN | WEIGHT: 315 LBS

## 2024-11-05 DIAGNOSIS — E11.69 TYPE 2 DIABETES MELLITUS WITH OTHER SPECIFIED COMPLICATION, WITHOUT LONG-TERM CURRENT USE OF INSULIN (HCC): Primary | ICD-10-CM

## 2024-11-05 PROCEDURE — NBSRV NON-BILLABLE SERVICE: Performed by: DIETITIAN, REGISTERED

## 2024-11-05 PROCEDURE — 97802 MEDICAL NUTRITION INDIV IN: CPT | Performed by: DIETITIAN, REGISTERED

## 2024-11-05 RX ORDER — DAPAGLIFLOZIN 10 MG/1
10 TABLET, FILM COATED ORAL EVERY MORNING
COMMUNITY

## 2024-11-05 RX ORDER — LOSARTAN POTASSIUM 25 MG/1
25 TABLET ORAL 2 TIMES DAILY
COMMUNITY

## 2024-11-05 RX ORDER — TAMSULOSIN HYDROCHLORIDE 0.4 MG/1
0.4 CAPSULE ORAL DAILY
COMMUNITY

## 2024-11-05 RX ORDER — GINSENG 100 MG
50 CAPSULE ORAL DAILY
COMMUNITY

## 2024-11-05 NOTE — PROGRESS NOTES
brand\" nuts from [place]/ Potato chips/Milk/Popcorn from [facility] and adds additional butter to this. They also keep cookies and Little Demetria snack foods in the home and will occ eat.  To Bed 9 pm.    Eat out/take out - pizza once a month. Marquis Brunson - gets Biscuits and gravy every 2 weeks. Captain Schmidt fish - 1-2 x/mo - 3 piece fish fillet dinner with broccoli & hush puffies. Adds Butter and salt to his Broccoli.  [employer] - 4x/year.  [employer] - 2x/year.  Fast food - brkf sandwich - Arbys and rodrigo and BK    -Main Beverages: 2% milk - 3 cups/day. Water.  Zero or Diet pop 2-3 bottles/day    -Impression of Dietary Intake: on average, 3 meals per day, on average, 1-2 dining out or fast food meals per week, high fat/ cholesterol, high salt, lacking routine intake of fruits and vegetables    -  Current Outpatient Medications on File Prior to Visit   Medication Sig Dispense Refill    CRANBERRY-VITAMIN C-D MANNOSE PO Take by mouth daily 500 mg D-Mannose daily for urinary tract health.      NONFORMULARY Fruits and veggies - 3 capsules of each      zinc 50 MG TABS tablet Take 1 tablet by mouth daily      dapagliflozin (FARXIGA) 10 MG tablet Take 1 tablet by mouth every morning      losartan (COZAAR) 25 MG tablet Take 1 tablet by mouth 2 times daily      tamsulosin (FLOMAX) 0.4 MG capsule Take 1 capsule by mouth daily      NONFORMULARY daily EB-A7 - 1 capsule daily. For plantar fascititis - prescribed by Dr Lebron      metFORMIN (GLUCOPHAGE) 1000 MG tablet Take 0.5 tablets by mouth 2 times daily (with meals)      cinacalcet (SENSIPAR) 30 MG tablet Take 1 tablet by mouth in the morning and at bedtime      allopurinol (ZYLOPRIM) 100 MG tablet Take 3 tablets by mouth daily      Finerenone (KERENDIA) 10 MG TABS Take by mouth      Cholecalciferol (VITAMIN D3) 1.25 MG (40666 UT) TABS Take by mouth Every Sunday      glipiZIDE (GLUCOTROL) 5 MG tablet Take 3 tablets by mouth every morning Taking 2 tablets daily

## 2024-11-05 NOTE — PATIENT INSTRUCTIONS
1.)  Get familiar with reading the nutrition facts label by looking at serving size and total carbohydrates.  - Without a label refer to your carb count guide booklet.    2.)  Measure foods for accuracy in carb counting.    3.)  Healthy carb choices:  whole grains, whole wheat pasta, starchy vegetables, fresh fruit and lowfat/non-fat milk and yogurt.    4.)  Your meal plan is found on the inside cover of your carb counting guide booklet:  1st meal or Brkf:  60 gms carbohydrates + 1-2 oz protein  2nd meal or Lunch: 60 gms carbohydrates + 3 oz protein + non-starchy veggies  3rd meal or Dinner:  60 gms carbohydrates + 4 oz protein + non- starchy veggies    Snack time:  15 - 25 gms carbohydrates + 1 oz protein    5.)  Choose lean protein most of the time and Cut in 1/2 added fats to help with weight loss efforts.    6.) Bring a 1-2 week food log and meter to next dietitian appt.  Thanks.  Check BS:  1-2x/day  Fasting BS (1st thing in the morning) or before a meal (at least 4 hour since last ate), BS goal:  90 - 130  2 hours after the start of a meal, BS goal:  100 - 150     7.)  Keep active each day as able.  Stationary bike/short walks etc.

## 2024-12-02 ENCOUNTER — HOSPITAL ENCOUNTER (OUTPATIENT)
Age: 69
Setting detail: OBSERVATION
Discharge: HOME | End: 2024-12-04
Attending: EMERGENCY MEDICINE
Payer: MEDICARE

## 2024-12-02 ENCOUNTER — APPOINTMENT (OUTPATIENT)
Dept: CT IMAGING | Age: 69
End: 2024-12-02
Payer: MEDICARE

## 2024-12-02 ENCOUNTER — ANESTHESIA EVENT (OUTPATIENT)
Dept: OPERATING ROOM | Age: 69
End: 2024-12-02
Payer: MEDICARE

## 2024-12-02 ENCOUNTER — ANESTHESIA (OUTPATIENT)
Dept: OPERATING ROOM | Age: 69
End: 2024-12-02
Payer: MEDICARE

## 2024-12-02 DIAGNOSIS — N17.9 AKI (ACUTE KIDNEY INJURY) (HCC): ICD-10-CM

## 2024-12-02 DIAGNOSIS — N20.0 CALCULUS OF KIDNEY: ICD-10-CM

## 2024-12-02 DIAGNOSIS — N20.1 URETEROLITHIASIS: Primary | ICD-10-CM

## 2024-12-02 LAB
ANION GAP SERPL CALC-SCNC: 13 MEQ/L (ref 8–16)
BACTERIA URNS QL MICRO: ABNORMAL /HPF
BASOPHILS ABSOLUTE: 0.1 THOU/MM3 (ref 0–0.1)
BASOPHILS NFR BLD AUTO: 0.6 %
BILIRUB UR QL STRIP.AUTO: NEGATIVE
BUN SERPL-MCNC: 43 MG/DL (ref 7–22)
CALCIUM SERPL-MCNC: 11.3 MG/DL (ref 8.5–10.5)
CASTS #/AREA URNS LPF: ABNORMAL /LPF
CASTS 2: ABNORMAL /LPF
CHARACTER UR: CLEAR
CHLORIDE SERPL-SCNC: 100 MEQ/L (ref 98–111)
CO2 SERPL-SCNC: 21 MEQ/L (ref 23–33)
COLOR, UA: YELLOW
CREAT SERPL-MCNC: 3 MG/DL (ref 0.4–1.2)
CREAT UR-MCNC: 100.7 MG/DL
CRYSTALS URNS MICRO: ABNORMAL
DEPRECATED RDW RBC AUTO: 43.2 FL (ref 35–45)
EOSINOPHIL NFR BLD AUTO: 2 %
EOSINOPHILS ABSOLUTE: 0.2 THOU/MM3 (ref 0–0.4)
EPITHELIAL CELLS, UA: ABNORMAL /HPF
ERYTHROCYTE [DISTWIDTH] IN BLOOD BY AUTOMATED COUNT: 13.9 % (ref 11.5–14.5)
GFR SERPL CREATININE-BSD FRML MDRD: 22 ML/MIN/1.73M2
GLUCOSE BLD STRIP.AUTO-MCNC: 139 MG/DL (ref 70–108)
GLUCOSE BLD STRIP.AUTO-MCNC: 73 MG/DL (ref 70–108)
GLUCOSE BLD STRIP.AUTO-MCNC: 81 MG/DL (ref 70–108)
GLUCOSE BLD STRIP.AUTO-MCNC: 82 MG/DL (ref 70–108)
GLUCOSE SERPL-MCNC: 115 MG/DL (ref 70–108)
GLUCOSE UR QL STRIP.AUTO: 500 MG/DL
HCT VFR BLD AUTO: 48.9 % (ref 42–52)
HGB BLD-MCNC: 16.1 GM/DL (ref 14–18)
HGB UR QL STRIP.AUTO: NEGATIVE
IMM GRANULOCYTES # BLD AUTO: 0.1 THOU/MM3 (ref 0–0.07)
IMM GRANULOCYTES NFR BLD AUTO: 0.9 %
KETONES UR QL STRIP.AUTO: NEGATIVE
LYMPHOCYTES ABSOLUTE: 1.1 THOU/MM3 (ref 1–4.8)
LYMPHOCYTES NFR BLD AUTO: 10.2 %
MAGNESIUM SERPL-MCNC: 2.4 MG/DL (ref 1.6–2.4)
MCH RBC QN AUTO: 28.2 PG (ref 26–33)
MCHC RBC AUTO-ENTMCNC: 32.9 GM/DL (ref 32.2–35.5)
MCV RBC AUTO: 85.8 FL (ref 80–94)
MISCELLANEOUS 2: ABNORMAL
MONOCYTES ABSOLUTE: 1.3 THOU/MM3 (ref 0.4–1.3)
MONOCYTES NFR BLD AUTO: 12.6 %
NEUTROPHILS ABSOLUTE: 7.9 THOU/MM3 (ref 1.8–7.7)
NEUTROPHILS NFR BLD AUTO: 73.7 %
NITRITE UR QL STRIP: NEGATIVE
NRBC BLD AUTO-RTO: 0 /100 WBC
OSMOLALITY SERPL CALC.SUM OF ELEC: 280 MOSMOL/KG (ref 275–300)
PH UR STRIP.AUTO: 5 [PH] (ref 5–9)
PLATELET # BLD AUTO: 289 THOU/MM3 (ref 130–400)
PMV BLD AUTO: 9.3 FL (ref 9.4–12.4)
POTASSIUM SERPL-SCNC: 4.3 MEQ/L (ref 3.5–5.2)
PROT UR STRIP.AUTO-MCNC: 30 MG/DL
RBC # BLD AUTO: 5.7 MILL/MM3 (ref 4.7–6.1)
RBC URINE: ABNORMAL /HPF
RENAL EPI CELLS #/AREA URNS HPF: ABNORMAL /[HPF]
SODIUM SERPL-SCNC: 134 MEQ/L (ref 135–145)
SP GR UR REFRACT.AUTO: 1.01 (ref 1–1.03)
UROBILINOGEN, URINE: 0.2 EU/DL (ref 0–1)
UUN 24H UR-MCNC: 579 MG/DL
WBC # BLD AUTO: 10.7 THOU/MM3 (ref 4.8–10.8)
WBC #/AREA URNS HPF: ABNORMAL /HPF
WBC #/AREA URNS HPF: NEGATIVE /[HPF]
YEAST LIKE FUNGI URNS QL MICRO: ABNORMAL

## 2024-12-02 PROCEDURE — G0378 HOSPITAL OBSERVATION PER HR: HCPCS

## 2024-12-02 PROCEDURE — 3600000013 HC SURGERY LEVEL 3 ADDTL 15MIN: Performed by: UROLOGY

## 2024-12-02 PROCEDURE — 3700000001 HC ADD 15 MINUTES (ANESTHESIA): Performed by: UROLOGY

## 2024-12-02 PROCEDURE — C1769 GUIDE WIRE: HCPCS | Performed by: UROLOGY

## 2024-12-02 PROCEDURE — 7100000001 HC PACU RECOVERY - ADDTL 15 MIN: Performed by: UROLOGY

## 2024-12-02 PROCEDURE — 82948 REAGENT STRIP/BLOOD GLUCOSE: CPT

## 2024-12-02 PROCEDURE — C1894 INTRO/SHEATH, NON-LASER: HCPCS | Performed by: UROLOGY

## 2024-12-02 PROCEDURE — 74176 CT ABD & PELVIS W/O CONTRAST: CPT

## 2024-12-02 PROCEDURE — 80048 BASIC METABOLIC PNL TOTAL CA: CPT

## 2024-12-02 PROCEDURE — 82570 ASSAY OF URINE CREATININE: CPT

## 2024-12-02 PROCEDURE — 99221 1ST HOSP IP/OBS SF/LOW 40: CPT | Performed by: UROLOGY

## 2024-12-02 PROCEDURE — 6360000002 HC RX W HCPCS: Performed by: REGISTERED NURSE

## 2024-12-02 PROCEDURE — 82365 CALCULUS SPECTROSCOPY: CPT

## 2024-12-02 PROCEDURE — 85025 COMPLETE CBC W/AUTO DIFF WBC: CPT

## 2024-12-02 PROCEDURE — 93005 ELECTROCARDIOGRAM TRACING: CPT | Performed by: PHYSICIAN ASSISTANT

## 2024-12-02 PROCEDURE — 99285 EMERGENCY DEPT VISIT HI MDM: CPT

## 2024-12-02 PROCEDURE — 7100000000 HC PACU RECOVERY - FIRST 15 MIN: Performed by: UROLOGY

## 2024-12-02 PROCEDURE — 2720000010 HC SURG SUPPLY STERILE: Performed by: UROLOGY

## 2024-12-02 PROCEDURE — 36415 COLL VENOUS BLD VENIPUNCTURE: CPT

## 2024-12-02 PROCEDURE — C2617 STENT, NON-COR, TEM W/O DEL: HCPCS | Performed by: UROLOGY

## 2024-12-02 PROCEDURE — 84540 ASSAY OF URINE/UREA-N: CPT

## 2024-12-02 PROCEDURE — 2709999900 HC NON-CHARGEABLE SUPPLY: Performed by: UROLOGY

## 2024-12-02 PROCEDURE — C1747 HC ENDOSCOPE, SINGLE, URINARY TRACT: HCPCS | Performed by: UROLOGY

## 2024-12-02 PROCEDURE — 83735 ASSAY OF MAGNESIUM: CPT

## 2024-12-02 PROCEDURE — 99223 1ST HOSP IP/OBS HIGH 75: CPT | Performed by: PHYSICIAN ASSISTANT

## 2024-12-02 PROCEDURE — 2580000003 HC RX 258: Performed by: PHYSICIAN ASSISTANT

## 2024-12-02 PROCEDURE — 81001 URINALYSIS AUTO W/SCOPE: CPT

## 2024-12-02 PROCEDURE — 3700000000 HC ANESTHESIA ATTENDED CARE: Performed by: UROLOGY

## 2024-12-02 PROCEDURE — 3600000003 HC SURGERY LEVEL 3 BASE: Performed by: UROLOGY

## 2024-12-02 PROCEDURE — 6370000000 HC RX 637 (ALT 250 FOR IP): Performed by: PHYSICIAN ASSISTANT

## 2024-12-02 DEVICE — VARIABLE LENGTH URETERAL STENT
Type: IMPLANTABLE DEVICE | Site: URETER | Status: FUNCTIONAL
Brand: CONTOUR VL™

## 2024-12-02 RX ORDER — SODIUM CHLORIDE 9 MG/ML
INJECTION, SOLUTION INTRAVENOUS PRN
Status: DISCONTINUED | OUTPATIENT
Start: 2024-12-02 | End: 2024-12-04 | Stop reason: HOSPADM

## 2024-12-02 RX ORDER — INSULIN LISPRO 100 [IU]/ML
0-4 INJECTION, SOLUTION INTRAVENOUS; SUBCUTANEOUS
Status: DISCONTINUED | OUTPATIENT
Start: 2024-12-02 | End: 2024-12-04 | Stop reason: HOSPADM

## 2024-12-02 RX ORDER — SODIUM CHLORIDE 0.9 % (FLUSH) 0.9 %
5-40 SYRINGE (ML) INJECTION PRN
Status: DISCONTINUED | OUTPATIENT
Start: 2024-12-02 | End: 2024-12-04 | Stop reason: HOSPADM

## 2024-12-02 RX ORDER — SODIUM CHLORIDE 0.9 % (FLUSH) 0.9 %
5-40 SYRINGE (ML) INJECTION EVERY 12 HOURS SCHEDULED
Status: DISCONTINUED | OUTPATIENT
Start: 2024-12-02 | End: 2024-12-04 | Stop reason: HOSPADM

## 2024-12-02 RX ORDER — SODIUM CHLORIDE 0.9 % (FLUSH) 0.9 %
5-40 SYRINGE (ML) INJECTION EVERY 12 HOURS SCHEDULED
Status: DISCONTINUED | OUTPATIENT
Start: 2024-12-02 | End: 2024-12-02 | Stop reason: HOSPADM

## 2024-12-02 RX ORDER — POTASSIUM CHLORIDE 1500 MG/1
40 TABLET, EXTENDED RELEASE ORAL PRN
Status: DISCONTINUED | OUTPATIENT
Start: 2024-12-02 | End: 2024-12-04 | Stop reason: HOSPADM

## 2024-12-02 RX ORDER — DEXTROSE MONOHYDRATE 100 MG/ML
INJECTION, SOLUTION INTRAVENOUS CONTINUOUS PRN
Status: DISCONTINUED | OUTPATIENT
Start: 2024-12-02 | End: 2024-12-04 | Stop reason: HOSPADM

## 2024-12-02 RX ORDER — SODIUM CHLORIDE 9 MG/ML
INJECTION, SOLUTION INTRAVENOUS CONTINUOUS
Status: ACTIVE | OUTPATIENT
Start: 2024-12-02 | End: 2024-12-03

## 2024-12-02 RX ORDER — DEXAMETHASONE SODIUM PHOSPHATE 10 MG/ML
INJECTION, EMULSION INTRAMUSCULAR; INTRAVENOUS
Status: DISCONTINUED | OUTPATIENT
Start: 2024-12-02 | End: 2024-12-02 | Stop reason: SDUPTHER

## 2024-12-02 RX ORDER — ALLOPURINOL 300 MG/1
300 TABLET ORAL DAILY
Status: DISCONTINUED | OUTPATIENT
Start: 2024-12-03 | End: 2024-12-04 | Stop reason: HOSPADM

## 2024-12-02 RX ORDER — ACETAMINOPHEN 325 MG/1
650 TABLET ORAL EVERY 6 HOURS PRN
Status: DISCONTINUED | OUTPATIENT
Start: 2024-12-02 | End: 2024-12-04 | Stop reason: HOSPADM

## 2024-12-02 RX ORDER — ASPIRIN 81 MG/1
81 TABLET, CHEWABLE ORAL DAILY
Status: DISCONTINUED | OUTPATIENT
Start: 2024-12-02 | End: 2024-12-04 | Stop reason: HOSPADM

## 2024-12-02 RX ORDER — ONDANSETRON 2 MG/ML
INJECTION INTRAMUSCULAR; INTRAVENOUS
Status: DISCONTINUED | OUTPATIENT
Start: 2024-12-02 | End: 2024-12-02 | Stop reason: SDUPTHER

## 2024-12-02 RX ORDER — SODIUM CHLORIDE 0.9 % (FLUSH) 0.9 %
5-40 SYRINGE (ML) INJECTION PRN
Status: DISCONTINUED | OUTPATIENT
Start: 2024-12-02 | End: 2024-12-02 | Stop reason: HOSPADM

## 2024-12-02 RX ORDER — FENTANYL CITRATE 50 UG/ML
50 INJECTION, SOLUTION INTRAMUSCULAR; INTRAVENOUS EVERY 5 MIN PRN
Status: DISCONTINUED | OUTPATIENT
Start: 2024-12-02 | End: 2024-12-02 | Stop reason: HOSPADM

## 2024-12-02 RX ORDER — BUMETANIDE 1 MG/1
2 TABLET ORAL 2 TIMES DAILY
Status: DISCONTINUED | OUTPATIENT
Start: 2024-12-02 | End: 2024-12-04 | Stop reason: HOSPADM

## 2024-12-02 RX ORDER — POTASSIUM CHLORIDE 7.45 MG/ML
10 INJECTION INTRAVENOUS PRN
Status: DISCONTINUED | OUTPATIENT
Start: 2024-12-02 | End: 2024-12-04 | Stop reason: HOSPADM

## 2024-12-02 RX ORDER — CINACALCET 30 MG/1
30 TABLET, FILM COATED ORAL 2 TIMES DAILY
Status: DISCONTINUED | OUTPATIENT
Start: 2024-12-02 | End: 2024-12-04 | Stop reason: HOSPADM

## 2024-12-02 RX ORDER — POLYETHYLENE GLYCOL 3350 17 G/17G
17 POWDER, FOR SOLUTION ORAL DAILY PRN
Status: DISCONTINUED | OUTPATIENT
Start: 2024-12-02 | End: 2024-12-04 | Stop reason: HOSPADM

## 2024-12-02 RX ORDER — FENTANYL CITRATE 50 UG/ML
INJECTION, SOLUTION INTRAMUSCULAR; INTRAVENOUS
Status: DISCONTINUED | OUTPATIENT
Start: 2024-12-02 | End: 2024-12-02 | Stop reason: SDUPTHER

## 2024-12-02 RX ORDER — ONDANSETRON 4 MG/1
4 TABLET, ORALLY DISINTEGRATING ORAL EVERY 8 HOURS PRN
Status: DISCONTINUED | OUTPATIENT
Start: 2024-12-02 | End: 2024-12-04 | Stop reason: HOSPADM

## 2024-12-02 RX ORDER — TAMSULOSIN HYDROCHLORIDE 0.4 MG/1
0.4 CAPSULE ORAL DAILY
Status: DISCONTINUED | OUTPATIENT
Start: 2024-12-03 | End: 2024-12-04 | Stop reason: HOSPADM

## 2024-12-02 RX ORDER — LIDOCAINE HCL/PF 100 MG/5ML
SYRINGE (ML) INJECTION
Status: DISCONTINUED | OUTPATIENT
Start: 2024-12-02 | End: 2024-12-02 | Stop reason: SDUPTHER

## 2024-12-02 RX ORDER — SODIUM CHLORIDE 9 MG/ML
INJECTION, SOLUTION INTRAVENOUS PRN
Status: DISCONTINUED | OUTPATIENT
Start: 2024-12-02 | End: 2024-12-02 | Stop reason: HOSPADM

## 2024-12-02 RX ORDER — ONDANSETRON 2 MG/ML
4 INJECTION INTRAMUSCULAR; INTRAVENOUS EVERY 6 HOURS PRN
Status: DISCONTINUED | OUTPATIENT
Start: 2024-12-02 | End: 2024-12-04 | Stop reason: HOSPADM

## 2024-12-02 RX ORDER — PRAVASTATIN SODIUM 20 MG
20 TABLET ORAL NIGHTLY
Status: DISCONTINUED | OUTPATIENT
Start: 2024-12-02 | End: 2024-12-04 | Stop reason: HOSPADM

## 2024-12-02 RX ORDER — FENTANYL CITRATE 50 UG/ML
25 INJECTION, SOLUTION INTRAMUSCULAR; INTRAVENOUS EVERY 5 MIN PRN
Status: DISCONTINUED | OUTPATIENT
Start: 2024-12-02 | End: 2024-12-02 | Stop reason: HOSPADM

## 2024-12-02 RX ORDER — NALOXONE HYDROCHLORIDE 0.4 MG/ML
INJECTION, SOLUTION INTRAMUSCULAR; INTRAVENOUS; SUBCUTANEOUS PRN
Status: DISCONTINUED | OUTPATIENT
Start: 2024-12-02 | End: 2024-12-02 | Stop reason: HOSPADM

## 2024-12-02 RX ORDER — GLUCAGON 1 MG/ML
1 KIT INJECTION PRN
Status: DISCONTINUED | OUTPATIENT
Start: 2024-12-02 | End: 2024-12-04 | Stop reason: HOSPADM

## 2024-12-02 RX ORDER — ACETAMINOPHEN 650 MG/1
650 SUPPOSITORY RECTAL EVERY 6 HOURS PRN
Status: DISCONTINUED | OUTPATIENT
Start: 2024-12-02 | End: 2024-12-04 | Stop reason: HOSPADM

## 2024-12-02 RX ORDER — LOSARTAN POTASSIUM 25 MG/1
25 TABLET ORAL 2 TIMES DAILY
Status: DISCONTINUED | OUTPATIENT
Start: 2024-12-02 | End: 2024-12-04 | Stop reason: HOSPADM

## 2024-12-02 RX ORDER — PROPOFOL 10 MG/ML
INJECTION, EMULSION INTRAVENOUS
Status: DISCONTINUED | OUTPATIENT
Start: 2024-12-02 | End: 2024-12-02 | Stop reason: SDUPTHER

## 2024-12-02 RX ORDER — MAGNESIUM SULFATE IN WATER 40 MG/ML
2000 INJECTION, SOLUTION INTRAVENOUS PRN
Status: DISCONTINUED | OUTPATIENT
Start: 2024-12-02 | End: 2024-12-04 | Stop reason: HOSPADM

## 2024-12-02 RX ORDER — ONDANSETRON 2 MG/ML
4 INJECTION INTRAMUSCULAR; INTRAVENOUS
Status: DISCONTINUED | OUTPATIENT
Start: 2024-12-02 | End: 2024-12-02 | Stop reason: HOSPADM

## 2024-12-02 RX ADMIN — SODIUM CHLORIDE, PRESERVATIVE FREE 10 ML: 5 INJECTION INTRAVENOUS at 21:27

## 2024-12-02 RX ADMIN — DEXAMETHASONE SODIUM PHOSPHATE 8 MG: 10 INJECTION, EMULSION INTRAMUSCULAR; INTRAVENOUS at 17:47

## 2024-12-02 RX ADMIN — FENTANYL CITRATE 100 MCG: 50 INJECTION, SOLUTION INTRAMUSCULAR; INTRAVENOUS at 18:06

## 2024-12-02 RX ADMIN — ONDANSETRON 4 MG: 2 INJECTION INTRAMUSCULAR; INTRAVENOUS at 17:47

## 2024-12-02 RX ADMIN — CINACALCET HYDROCHLORIDE 30 MG: 30 TABLET, FILM COATED ORAL at 21:27

## 2024-12-02 RX ADMIN — SODIUM CHLORIDE: 9 INJECTION, SOLUTION INTRAVENOUS at 11:55

## 2024-12-02 RX ADMIN — PROPOFOL 150 MG: 10 INJECTION, EMULSION INTRAVENOUS at 17:44

## 2024-12-02 RX ADMIN — FENTANYL CITRATE 100 MCG: 50 INJECTION, SOLUTION INTRAMUSCULAR; INTRAVENOUS at 17:48

## 2024-12-02 RX ADMIN — Medication 60 MG: at 17:44

## 2024-12-02 RX ADMIN — PRAVASTATIN SODIUM 20 MG: 20 TABLET ORAL at 21:27

## 2024-12-02 ASSESSMENT — PAIN DESCRIPTION - ORIENTATION: ORIENTATION: LOWER;LEFT

## 2024-12-02 ASSESSMENT — PAIN - FUNCTIONAL ASSESSMENT
PAIN_FUNCTIONAL_ASSESSMENT: NONE - DENIES PAIN
PAIN_FUNCTIONAL_ASSESSMENT: 0-10

## 2024-12-02 ASSESSMENT — PAIN DESCRIPTION - LOCATION: LOCATION: ABDOMEN

## 2024-12-02 ASSESSMENT — ENCOUNTER SYMPTOMS: SHORTNESS OF BREATH: 1

## 2024-12-02 NOTE — PLAN OF CARE
Problem: Chronic Conditions and Co-morbidities  Goal: Patient's chronic conditions and co-morbidity symptoms are monitored and maintained or improved  Outcome: Progressing  Flowsheets (Taken 12/2/2024 1711)  Care Plan - Patient's Chronic Conditions and Co-Morbidity Symptoms are Monitored and Maintained or Improved:   Monitor and assess patient's chronic conditions and comorbid symptoms for stability, deterioration, or improvement   Collaborate with multidisciplinary team to address chronic and comorbid conditions and prevent exacerbation or deterioration   Update acute care plan with appropriate goals if chronic or comorbid symptoms are exacerbated and prevent overall improvement and discharge     Problem: Discharge Planning  Goal: Discharge to home or other facility with appropriate resources  Outcome: Progressing  Flowsheets (Taken 12/2/2024 1711)  Discharge to home or other facility with appropriate resources:   Identify barriers to discharge with patient and caregiver   Arrange for needed discharge resources and transportation as appropriate   Identify discharge learning needs (meds, wound care, etc)     Problem: Pain  Goal: Verbalizes/displays adequate comfort level or baseline comfort level  Outcome: Progressing  Flowsheets (Taken 12/2/2024 1711)  Verbalizes/displays adequate comfort level or baseline comfort level:   Encourage patient to monitor pain and request assistance   Assess pain using appropriate pain scale   Administer analgesics based on type and severity of pain and evaluate response     Problem: Safety - Adult  Goal: Free from fall injury  Outcome: Progressing  Flowsheets (Taken 12/2/2024 1711)  Free From Fall Injury:   Instruct family/caregiver on patient safety   Based on caregiver fall risk screen, instruct family/caregiver to ask for assistance with transferring infant if caregiver noted to have fall risk factors   Care plan reviewed with patient. Patient verbalizes understanding of plan of

## 2024-12-02 NOTE — ED NOTES
Pt presents to the ED with complaints of LL abdominal pain. Pt reports pain began Thursday. Pt expressed concern for UTI. Pt report he had similar s/s and was diagnosed with UTI. Pt has hx of kidney disease. Pt reports he was having chills and felt warm but denies having a fever. Pt respirations are even and unlabored.

## 2024-12-02 NOTE — CONSULTS
stroke.  Lymphatic: Denies swollen glands in neck, axillary or inguinal areas.  Psychiatric: Denies anxiety or depression.  Skin: Denies rash or lesions.  The remainder of the complete ROS is negative    PHYSICAL EXAM:  VITALS:  /76   Pulse 76   Temp 97.6 °F (36.4 °C) (Oral)   Resp 17   Ht 1.803 m (5' 11\")   Wt (!) 149.7 kg (330 lb)   SpO2 97%   BMI 46.03 kg/m² .  Nursing note and vitals reviewed.  Constitutional:    Alert and oriented times 3, no acute distress and cooperative to examination with appropriate mood and affect.   HEENT:   Head:         Normocephalic and atraumatic.   Mouth/Throat:          Mucous membranes are normal.   Eyes:         EOM are normal. No scleral icterus.  Nose:    The external appearance of the nose is normal  Ears:     The ears appear normal to external inspection   Neck:         Supple, symmetrical, trachea midline, no adenopathy, thyroid symmetric, not enlarged and no tenderness.   Cardiovascular:        Normal rate, regular rhythm, S1 S2 heart sounds.    Pulmonary/Chest:       Chest symmetric with normal A/P diameter, no wheezes, rales, or rhonchi noted. Normal respiratory rate and rhthym.  No use of accessory muscles.   Abdominal:          Soft. No tenderness. + LEFT CVA Tenderness  Genitalia: Voiding spontaneously  Musculoskeletal:    Normal range of motion. He exhibits no edema or tenderness of lower extremities.    Extremities:    No cyanosis, clubbing, or edema present.  Neurological:    Alert and oriented. No cranial nerve deficit. There are no focalizing motor or sensory deficits.    DATA:  CBC:   Lab Results   Component Value Date/Time    WBC 10.7 12/02/2024 08:00 AM    RBC 5.70 12/02/2024 08:00 AM    HGB 16.1 12/02/2024 08:00 AM    HCT 48.9 12/02/2024 08:00 AM    MCV 85.8 12/02/2024 08:00 AM    MCH 28.2 12/02/2024 08:00 AM    MCHC 32.9 12/02/2024 08:00 AM    RDW 13.8 03/20/2018 07:26 AM     12/02/2024 08:00 AM    MPV 9.3 12/02/2024 08:00 AM     BMP:  
a CT scan that I have reviewed along with its accompanying report.  The study demonstrates an 8 mm stone in the proximal left ureter.    IMPRESSION:  1. An 8 mm obstructing calculus in the proximal to mid left ureter (series 301,  image 46) results in mild to moderate left hydronephrosis and hydroureter.  Nonobstructing left nephrolithiasis is also discussed.     2. Colonic diverticulosis without diverticulitis. Normal appendix. No bowel  obstruction.     3. Chronic findings are discussed.    Assessment and Plan  Mr. Arizmendi was admitted for left pain and a left proximal ureteral stone.  He has pain but reports he has not felt the stone moving at all.  He would like to have it dealt with ASAP and we will plan intervention.    My recommendation is for cystoscopy with left ureteroscopy, possible laser lithotripsy, basket retrieval of ureteral stone fragments, removal of left renal stone or stone fragments and placement of a left ureteral stent.    Parth can likely go home tomorrow and we will plan to remove his stent in the office in a week.  We will leave the discharge decision to the hospitalist service pending evaluation and labs.    Thank you for the opportunity of allowing us to participate in Clem's care.    Joey Steiner MD, FACS  [facility]  12/2/2024    [phone]

## 2024-12-02 NOTE — ACP (ADVANCE CARE PLANNING)
Advance Care Planning     Advance Care Planning Inpatient Note  Sp[facility]ual [facility] Department    Today's Date: 12/2/2024  Unit: [facility] RENAL TELEMETRY 6K    Received request from IDT Member.  Upon review of chart and communication with care team, patient's decision making abilities are not in question.. Patient, Spouse, and Child/Children was/were present in the room during visit.    Goals of ACP Conversation:  Discuss advance care planning documents  Facilitate a discussion related to patient's goals of care as they align with the patient's values and beliefs.    Health Care Decision Makers:       Primary Decision Maker: Mariana Arizmendi - Spouse - [phone]    Secondary Decision Maker: Eddie Arizmendi - Child - [phone]    Supplemental (Other) Decision Maker: PA ARIZMENDI - Child - [phone]  Summary:  Completed New Documents    Advance Care Planning Documents (Patient Wishes):  Healthcare Power of /Advance Directive Appointment of Health Care Agent  Living Will/Advance Directive     Assessment:  This is a spiritual health encounter in response to request for ACP conversation with patient. Patient, Clem, shared about his family, including his 4 year old granddaughter who he \"wants to live as long as possible\" for. Patient opted to complete both the HCPOA and LW documents.  assisted in education and document completion. Patient expressed nervousness about an upcoming procedure and  provided prayer, per patient request.  team will remain available to support patient/family, PRN.       Interventions:  Provided education on documents for clarity and greater understanding  Assisted in the completion of documents according to patient's wishes at this time  Encouraged ongoing ACP conversation with future decision makers and loved ones    Care Preferences Communicated:     Hospitalization:  If the patient's health worsens and it becomes clear that the chance of recovery is

## 2024-12-02 NOTE — ED NOTES
ED to inpatient nurses report      Chief Complaint:  Chief Complaint   Patient presents with    Abdominal Pain     Present to ED from: home    MOA:     LOC: alert and orientated to name, place, date  Mobility: Independent  Oxygen Baseline: RA    Current needs required: RA     Code Status:   Prior    What abnormal results were found and what did you give/do to treat them? none  Any procedures or intervention occur? none    Mental Status:  Level of Consciousness: Alert (0)    Psych Assessment:        Vitals:  Patient Vitals for the past 24 hrs:   BP Temp Temp src Pulse Resp SpO2 Height Weight   12/02/24 1010 121/80 -- -- 74 18 95 % -- --   12/02/24 0830 137/86 -- -- 68 20 94 % -- --   12/02/24 0800 (!) 152/76 97.6 °F (36.4 °C) Oral 70 22 94 % 1.803 m (5' 11\") (!) 149.7 kg (330 lb)        LDAs:   Peripheral IV 12/02/24 Distal;Right Cephalic (Active)   Site Assessment Clean, dry & intact 12/02/24 0808   Line Status Blood return noted;Flushed;Specimen collected 12/02/24 0808       Ambulatory Status:  No data recorded    Diagnosis:  DISPOSITION Admitted 12/02/2024 10:58:04 AM   Final diagnoses:   None        Consults:  None     Pain Score:  Pain Assessment  Pain Assessment: 0-10  Pain Location: Abdomen  Pain Orientation: Lower, Left    C-SSRS:   Risk of Suicide: No Risk    Sepsis Screening:       [place] Fall Risk:       Swallow Screening        Preferred Language:   English      ALLERGIES     Bactrim [sulfamethoxazole-trimethoprim], Celebrex [celecoxib], Diclofenac, Dolobid [diflunisal], Etodolac, Fenoprofen, Gentamycin [gentamicin], Ibuprofen, Indocin [indomethacin], Iv dye [iodides], Ketoprofen, Ketorolac, Meclofenamate, Mefenamic acid, Meloxicam, Nabumetone, Naproxen, Nsaids, Oxaprozin, Piroxicam, Rofecoxib, Salsalate, Sulindac, Tetracyclines & related, Tobramycin, Tolmetin, Valdecoxib, and Vancomycin    SURGICAL HISTORY       Past Surgical History:   Procedure Laterality Date    CARPAL TUNNEL RELEASE Left 2022

## 2024-12-02 NOTE — CARE COORDINATION
Advance Care Planning     General Advance Care Planning (ACP) Conversation    Date of Conversation: 12/2/2024  Conducted with: Patient with Decision Making Capacity  Other persons present: Spouse Mariana    Healthcare Decision Maker:   The identified healthcare power of /surrogate decision makers are as follows:   Primary Decision Maker: Mariaan Arizmendi - Spouse - [phone]    Secondary Decision Maker: Eddie Arizmendi - Child - [phone]    Secondary Decision Maker: GEOVANNA ARIZMENDISSE - Child - [phone]     Content/Action Overview:  Patient does not have any advanced directives and desires to have them completed and attached to the chart during this admission.  Referral to spiritual health made to complete ACP documents.    Treatment limitations and CODE STATUS to be reviewed by the provider.    Length of Voluntary ACP Conversation in minutes:  <16 minutes (Non-Billable)    CLARK HERRERA RN

## 2024-12-02 NOTE — ANESTHESIA PRE PROCEDURE
DARRIAN Kim       • dextrose bolus 10% 125 mL  125 mL IntraVENous PRN Julio Saleh PA-C        Or   • dextrose bolus 10% 250 mL  250 mL IntraVENous PRN Julio Saleh PA-C       • glucagon injection 1 mg  1 mg SubCUTAneous PRN Julio Saleh PA-C       • dextrose 10 % infusion   IntraVENous Continuous PRN Julio Saleh PA-C       • insulin lispro (HUMALOG,ADMELOG) injection vial 0-4 Units  0-4 Units SubCUTAneous 4x Daily AC & HS Julio Saleh PA-C       • [START ON 12/3/2024] allopurinol (ZYLOPRIM) tablet 300 mg  300 mg Oral Daily Julio Saleh PA-C       • [Held by provider] aspirin chewable tablet 81 mg  81 mg Oral Daily Julio Saleh PA-C       • [Held by provider] bumetanide (BUMEX) tablet 2 mg  2 mg Oral BID Julio Saleh PA-C       • cinacalcet (SENSIPAR) tablet 30 mg  30 mg Oral BID Julio Saleh PA-C       • [Held by provider] Finerenone TABS 10 mg (Patient Supplied)  10 mg Oral Daily Julio Saleh PA-C       • [Held by provider] losartan (COZAAR) tablet 25 mg  25 mg Oral BID Julio Saleh PA-C       • pravastatin (PRAVACHOL) tablet 20 mg  20 mg Oral Nightly Julio Saleh PA-C       • [START ON 12/3/2024] tamsulosin (FLOMAX) capsule 0.4 mg  0.4 mg Oral Daily Julio Saleh PA-C           Allergies:    Allergies   Allergen Reactions   • Bactrim [Sulfamethoxazole-Trimethoprim]      Monitor closely due to kidney disease   • Celebrex [Celecoxib]      Avoid due to kidney disease   • Diclofenac      Avoid due to kidney disease   • Dolobid [Diflunisal]      Avoid due to kidney disease   • Etodolac      Avoid due to kidney disease   • Fenoprofen      Avoid due to kidney disease   • Gentamycin [Gentamicin]      Monitor closely due to kidney disease   • Ibuprofen      Avoid due to kidney disease   • Indocin [Indomethacin]      Avoid due to kidney disease   • Iv Dye [Iodides]      Monitor closely due to kidney disease   •

## 2024-12-02 NOTE — PROCEDURES
PROCEDURE NOTE  Date: 12/2/2024   Name: Clem CARLOS Arizmendi  YOB: 1955    Procedures  EKG completed

## 2024-12-02 NOTE — ED PROVIDER NOTES
other components within normal limits   CBC WITH AUTO DIFFERENTIAL - Abnormal; Notable for the following components:    Immature Grans (Abs) 0.14 (*)     All other components within normal limits   GLOMERULAR FILTRATION RATE, ESTIMATED - Abnormal; Notable for the following components:    Est, Glom Filt Rate 26 (*)     All other components within normal limits   POCT GLUCOSE - Abnormal; Notable for the following components:    POC Glucose 139 (*)     All other components within normal limits   POCT GLUCOSE - Abnormal; Notable for the following components:    POC Glucose 188 (*)     All other components within normal limits   POCT GLUCOSE - Abnormal; Notable for the following components:    POC Glucose 234 (*)     All other components within normal limits   STONE ANALYSIS   MAGNESIUM   ANION GAP   OSMOLALITY   UREA NITROGEN, URINE   CREATININE, RANDOM URINE   ANION GAP   BASIC METABOLIC PANEL W/ REFLEX TO MG FOR LOW K   POCT GLUCOSE   POCT GLUCOSE   POCT GLUCOSE   POCT GLUCOSE       EMERGENCY DEPARTMENT COURSE:   Vitals:    Vitals:    12/03/24 0030 12/03/24 0350 12/03/24 0800 12/03/24 1230   BP: (!) 142/61 (!) 146/76 (!) 146/87 (!) 176/105   Pulse: 63 64 58 61   Resp: 18 18 16 15   Temp: 98.1 °F (36.7 °C) 98.2 °F (36.8 °C) 97.5 °F (36.4 °C) 98.1 °F (36.7 °C)   TempSrc: Oral Oral Oral Oral   SpO2: 95% 95% 97% 98%   Weight:  (!) 146.4 kg (322 lb 11.2 oz)     Height:             CRITICAL CARE:       CONSULTS:  None    PROCEDURES:  none    FINAL IMPRESSION      1. Calculus of kidney          DISPOSITION/PLAN   Admitted    PATIENT REFERRED TO:  Michael Foy MD  [address]  [address]  [facility] [address]  [phone]    Go on 12/10/2024  Hospital follow up appointment, Appt time: 11:00am    Joey Steiner MD  [address] [id number] [phone]    Schedule an appointment as soon as possible for a visit  The office will call to schedule a hospital follow up appointment.      DISCHARGE

## 2024-12-02 NOTE — H&P
Hospitalist History & Physical    Patient:  Clem Arizmendi    Unit/Bed:6K-27/027-A  YOB: 1955  MRN: [id number]   PCP: Michael Foy MD  Code Status: Full Code    Date of Service: The patient was seen and examined on 12/02/24 and admitted to Observation with an expected length of stay of less than two midnights due to medical therapy.     Chief Complaint: Abdominal pain, back pain    Assessment/Plan:    Left ureterolithiasis with associated left hydronephrosis and hydroureter  CT of the abdomen and pelvis notable for 8 mm obstructing calculus in the proximal left mid ureter with associated left hydronephrosis and hydroureter.  Urology consulted.  N.p.o.  UA without evidence of infection.  Continue tamsulosin.  LISA on CKD stage III  Creatinine 3.0, baseline appears to be around 1.6-1.7.  Suspect multifactorial secondary to decreased oral intake in the setting of diuretic use and possible obstructive uropathy in the setting of ureteral stone.  Hold nephrotoxic agents.  Check FEUrea  IV fluid resuscitation.  Recheck BMP tomorrow morning.  Hypercalcemia  Calcium 11.3.  Continue Cinacalcet.  IV fluid resuscitation.  Check ionized calcium tomorrow morning.  HFpEF not in acute exacerbation  Echo on 6/12/2024 with EF of 50 to 55%  Outpatient diuretic regimen includes Bumex 2 mg daily and finerenone 10 mg daily.  Hold diuretics.  Strict ins and outs. Daily weights. Low sodium diet. Fluid restriction.  Non-insulin-dependent type 2 diabetes mellitus  Hold oral meds. Carb controlled diet. Accuchecks. Low dose sliding scale.  Hyperlipidemia  Continue statin  Hypertension  Hold losartan due to LISA  Obstructive sleep apnea  Continue home CPAP    History of Present Illness:  Clem Arizmendi is a 69 y.o. male with a history of HFpEF, non-insulin-dependent type 2 diabetes mellitus, hypertension, hyperlipidemia, CKD stage III, and obstructive sleep apnea who presented to [facility] with chief complaint of

## 2024-12-02 NOTE — ED NOTES
Pt to be transferred to [facility]. Pt in stable condition. Spoke with Adela prior to transfer.

## 2024-12-02 NOTE — PROGRESS NOTES
1835 Patient arrived to PACU. Patient arouses to voice and follows commands. Penile area CDI with no drainage. Patient denies pain. Blood sugar 73. Patient given orange juice and tolerated well. Respirations even and unlabored. VSS.    1845 Patient awake, alert, and following commands. Patient denies pain. Respirations even and unlabored. VSS.    1855 Report called to Tabatha VEANS RN. RN called and updated patient's spouse, Mariana, at this time.    1905 Patient meets criteria to discharge from PACU at this time. Patient transported to [facility] in stable condition with all belongings. 6K telemetry box and cords in a clear bag hanging on the IV pole.

## 2024-12-02 NOTE — PROGRESS NOTES
See ACP note for this encounter.     CONSTANTINO Sandoval.Div     Spiritual Care Department  [place] [id number] [id number] [phone]

## 2024-12-02 NOTE — ANESTHESIA POSTPROCEDURE EVALUATION
Department of Anesthesiology  Postprocedure Note    Patient: Clem Arizmendi  MRN: [id number]  YOB: 1955  Date of evaluation: 12/2/2024    Procedure Summary       Date: 12/02/24 [facility]    Anesthesia Start: 1739 Anesthesia Stop: 1836    Procedure: CYSTO, LEFT URETEROSCOPY, LASER LITHOTRIPSY, BASKET RETRIEVAL OF STONE FRAGMENTS, LEFT STENT PLACEMENT (Left: Ureter) Diagnosis:       Calculus of kidney      (Calculus of kidney [N20.0])    Surgeons: Joey Steiner MD Responsible Provider: Elbert Wolf DO    Anesthesia Type: general ASA Status: 3            Anesthesia Type: No value filed.    Shree Phase I: Shree Score: 9    Shree Phase II:      Anesthesia Post Evaluation    Patient location during evaluation: PACU  Patient participation: complete - patient participated  Level of consciousness: sleepy but conscious, responsive to verbal stimuli and responsive to light touch  Pain score: 3  Airway patency: patent  Nausea & Vomiting: no nausea and no vomiting  Cardiovascular status: hemodynamically stable and blood pressure returned to baseline  Respiratory status: spontaneous ventilation, acceptable and nasal cannula  Hydration status: stable  Pain management: adequate and satisfactory to patient    No notable events documented.

## 2024-12-03 LAB
ANION GAP SERPL CALC-SCNC: 16 MEQ/L (ref 8–16)
ANION GAP SERPL CALC-SCNC: 16 MEQ/L (ref 8–16)
BASOPHILS ABSOLUTE: 0 THOU/MM3 (ref 0–0.1)
BASOPHILS NFR BLD AUTO: 0.5 %
BUN SERPL-MCNC: 46 MG/DL (ref 7–22)
BUN SERPL-MCNC: 47 MG/DL (ref 7–22)
CA-I BLD ISE-SCNC: 1.41 MMOL/L (ref 1.12–1.32)
CALCIUM SERPL-MCNC: 11 MG/DL (ref 8.5–10.5)
CALCIUM SERPL-MCNC: 11 MG/DL (ref 8.5–10.5)
CHLORIDE SERPL-SCNC: 98 MEQ/L (ref 98–111)
CHLORIDE SERPL-SCNC: 99 MEQ/L (ref 98–111)
CO2 SERPL-SCNC: 18 MEQ/L (ref 23–33)
CO2 SERPL-SCNC: 19 MEQ/L (ref 23–33)
CREAT SERPL-MCNC: 2.5 MG/DL (ref 0.4–1.2)
CREAT SERPL-MCNC: 2.6 MG/DL (ref 0.4–1.2)
DEPRECATED RDW RBC AUTO: 43.5 FL (ref 35–45)
EKG ATRIAL RATE: 67 BPM
EKG P AXIS: 12 DEGREES
EKG P-R INTERVAL: 178 MS
EKG Q-T INTERVAL: 388 MS
EKG QRS DURATION: 100 MS
EKG QTC CALCULATION (BAZETT): 409 MS
EKG R AXIS: -90 DEGREES
EKG VENTRICULAR RATE: 67 BPM
EOSINOPHIL NFR BLD AUTO: 0 %
EOSINOPHILS ABSOLUTE: 0 THOU/MM3 (ref 0–0.4)
ERYTHROCYTE [DISTWIDTH] IN BLOOD BY AUTOMATED COUNT: 14 % (ref 11.5–14.5)
GFR SERPL CREATININE-BSD FRML MDRD: 26 ML/MIN/1.73M2
GFR SERPL CREATININE-BSD FRML MDRD: 27 ML/MIN/1.73M2
GLUCOSE BLD STRIP.AUTO-MCNC: 178 MG/DL (ref 70–108)
GLUCOSE BLD STRIP.AUTO-MCNC: 188 MG/DL (ref 70–108)
GLUCOSE BLD STRIP.AUTO-MCNC: 226 MG/DL (ref 70–108)
GLUCOSE BLD STRIP.AUTO-MCNC: 234 MG/DL (ref 70–108)
GLUCOSE SERPL-MCNC: 197 MG/DL (ref 70–108)
GLUCOSE SERPL-MCNC: 219 MG/DL (ref 70–108)
HCT VFR BLD AUTO: 45.8 % (ref 42–52)
HGB BLD-MCNC: 14.9 GM/DL (ref 14–18)
IMM GRANULOCYTES # BLD AUTO: 0.14 THOU/MM3 (ref 0–0.07)
IMM GRANULOCYTES NFR BLD AUTO: 1.6 %
LYMPHOCYTES ABSOLUTE: 1 THOU/MM3 (ref 1–4.8)
LYMPHOCYTES NFR BLD AUTO: 11 %
MCH RBC QN AUTO: 27.8 PG (ref 26–33)
MCHC RBC AUTO-ENTMCNC: 32.5 GM/DL (ref 32.2–35.5)
MCV RBC AUTO: 85.4 FL (ref 80–94)
MONOCYTES ABSOLUTE: 0.6 THOU/MM3 (ref 0.4–1.3)
MONOCYTES NFR BLD AUTO: 6.8 %
NEUTROPHILS ABSOLUTE: 7 THOU/MM3 (ref 1.8–7.7)
NEUTROPHILS NFR BLD AUTO: 80.1 %
NRBC BLD AUTO-RTO: 0 /100 WBC
PLATELET # BLD AUTO: 307 THOU/MM3 (ref 130–400)
PMV BLD AUTO: 9.6 FL (ref 9.4–12.4)
POTASSIUM SERPL-SCNC: 4.2 MEQ/L (ref 3.5–5.2)
POTASSIUM SERPL-SCNC: 4.5 MEQ/L (ref 3.5–5.2)
RBC # BLD AUTO: 5.36 MILL/MM3 (ref 4.7–6.1)
SODIUM SERPL-SCNC: 132 MEQ/L (ref 135–145)
SODIUM SERPL-SCNC: 134 MEQ/L (ref 135–145)
WBC # BLD AUTO: 8.7 THOU/MM3 (ref 4.8–10.8)

## 2024-12-03 PROCEDURE — 2580000003 HC RX 258: Performed by: PHYSICIAN ASSISTANT

## 2024-12-03 PROCEDURE — 80048 BASIC METABOLIC PNL TOTAL CA: CPT

## 2024-12-03 PROCEDURE — 6370000000 HC RX 637 (ALT 250 FOR IP): Performed by: UROLOGY

## 2024-12-03 PROCEDURE — 96361 HYDRATE IV INFUSION ADD-ON: CPT

## 2024-12-03 PROCEDURE — 85025 COMPLETE CBC W/AUTO DIFF WBC: CPT

## 2024-12-03 PROCEDURE — 6370000000 HC RX 637 (ALT 250 FOR IP)

## 2024-12-03 PROCEDURE — G0378 HOSPITAL OBSERVATION PER HR: HCPCS

## 2024-12-03 PROCEDURE — 99232 SBSQ HOSP IP/OBS MODERATE 35: CPT | Performed by: PHYSICIAN ASSISTANT

## 2024-12-03 PROCEDURE — 96360 HYDRATION IV INFUSION INIT: CPT

## 2024-12-03 PROCEDURE — 82948 REAGENT STRIP/BLOOD GLUCOSE: CPT

## 2024-12-03 PROCEDURE — 36415 COLL VENOUS BLD VENIPUNCTURE: CPT

## 2024-12-03 PROCEDURE — 82330 ASSAY OF CALCIUM: CPT

## 2024-12-03 PROCEDURE — 93010 ELECTROCARDIOGRAM REPORT: CPT | Performed by: INTERNAL MEDICINE

## 2024-12-03 PROCEDURE — 2580000003 HC RX 258: Performed by: UROLOGY

## 2024-12-03 RX ORDER — HYDRALAZINE HYDROCHLORIDE 20 MG/ML
10 INJECTION INTRAMUSCULAR; INTRAVENOUS EVERY 6 HOURS PRN
Status: DISCONTINUED | OUTPATIENT
Start: 2024-12-03 | End: 2024-12-04 | Stop reason: HOSPADM

## 2024-12-03 RX ADMIN — CINACALCET HYDROCHLORIDE 30 MG: 30 TABLET, FILM COATED ORAL at 21:13

## 2024-12-03 RX ADMIN — TAMSULOSIN HYDROCHLORIDE 0.4 MG: 0.4 CAPSULE ORAL at 08:07

## 2024-12-03 RX ADMIN — PRAVASTATIN SODIUM 20 MG: 20 TABLET ORAL at 21:13

## 2024-12-03 RX ADMIN — INSULIN LISPRO 1 UNITS: 100 INJECTION, SOLUTION INTRAVENOUS; SUBCUTANEOUS at 18:02

## 2024-12-03 RX ADMIN — ALLOPURINOL 300 MG: 300 TABLET ORAL at 08:07

## 2024-12-03 RX ADMIN — INSULIN LISPRO 2 UNITS: 100 INJECTION, SOLUTION INTRAVENOUS; SUBCUTANEOUS at 12:39

## 2024-12-03 RX ADMIN — SODIUM CHLORIDE, PRESERVATIVE FREE 10 ML: 5 INJECTION INTRAVENOUS at 21:13

## 2024-12-03 RX ADMIN — CINACALCET HYDROCHLORIDE 30 MG: 30 TABLET, FILM COATED ORAL at 08:07

## 2024-12-03 RX ADMIN — SODIUM CHLORIDE: 9 INJECTION, SOLUTION INTRAVENOUS at 02:47

## 2024-12-03 RX ADMIN — Medication 4.8 MG: at 21:55

## 2024-12-03 RX ADMIN — INSULIN LISPRO 1 UNITS: 100 INJECTION, SOLUTION INTRAVENOUS; SUBCUTANEOUS at 08:07

## 2024-12-03 RX ADMIN — Medication 3 MG: at 00:34

## 2024-12-03 NOTE — PLAN OF CARE
Problem: Chronic Conditions and Co-morbidities  Goal: Patient's chronic conditions and co-morbidity symptoms are monitored and maintained or improved  Outcome: Progressing  Flowsheets (Taken 12/3/2024 0733)  Care Plan - Patient's Chronic Conditions and Co-Morbidity Symptoms are Monitored and Maintained or Improved:   Collaborate with multidisciplinary team to address chronic and comorbid conditions and prevent exacerbation or deterioration   Monitor and assess patient's chronic conditions and comorbid symptoms for stability, deterioration, or improvement   Update acute care plan with appropriate goals if chronic or comorbid symptoms are exacerbated and prevent overall improvement and discharge     Problem: Discharge Planning  Goal: Discharge to home or other facility with appropriate resources  Outcome: Progressing  Flowsheets (Taken 12/3/2024 0733)  Discharge to home or other facility with appropriate resources:   Refer to discharge planning if patient needs post-hospital services based on physician order or complex needs related to functional status, cognitive ability or social support system   Identify discharge learning needs (meds, wound care, etc)   Identify barriers to discharge with patient and caregiver     Problem: Pain  Goal: Verbalizes/displays adequate comfort level or baseline comfort level  Outcome: Progressing  Flowsheets (Taken 12/3/2024 0733)  Verbalizes/displays adequate comfort level or baseline comfort level:   Consider cultural and social influences on pain and pain management   Administer analgesics based on type and severity of pain and evaluate response   Encourage patient to monitor pain and request assistance     Problem: Safety - Adult  Goal: Free from fall injury  Outcome: Progressing

## 2024-12-03 NOTE — OP NOTE
operating room table.  After initiation of general anesthesia the patient was placed in lithotomy position for cystoscopy.  His groin was prepped and draped in the standard fashion for cystoscopy.    A 22 Citizen of [place] cystoscope was passed per urethra and the left ureteral orifice was seen.  A dual-flex wire was passed through the orifice and up to the kidney under fluoroscopic guidance.  The wire was left indwelling and the scope was removed.    A ureteral access sheath was passed over the wire under fluoroscopic guidance and up to just below the level of the stone.  A second wire was placed and the access sheath replaced.    A flexible ureteroscope was then passed up to the level of the ureteral stone and the stone was directly visualized.  The stone was broken into removable pieces using the laser.  All significant pieces were able to be grasped and gently removed using a zero-tip Nitinol basket.    The scope was then passed into the kidney and additional stones found, broken and removed.  There was significant debris in the kidney and, with this and multiple stones in the ureter and kidney, caused the procedure to require more than twice the standard time and effort.    With all stones that could be visualized or found removed, the access sheath was removed, leaving the second wire indwelling.    The wire was then used to pass a 6 [place] ureteral stent.  A good coil was visualized in the renal pelvis and also in the bladder via fluoroscopy as the stent was placed.  The distal coil was also visualized directly via the cystoscope as the bladder was drained.    Clem was taken out of lithotomy position after being cleaned and dried.  He was transferred to the PACU in stable condition.    He tolerated the procedure well, there were no complications.      Electronically signed by Joey Steiner MD on 12/2/2024 at 7:23 PM

## 2024-12-03 NOTE — PROGRESS NOTES
Hospitalist Progress Note      Patient:  Clem Arizmendi 69 y.o. male     Unit/Bed:-27/027-A    Date of Admission: 12/2/2024      ASSESSMENT AND PLAN    Active Problems  LISA on CKD Stage 3  Improving.  Baseline around 2.  Creatinine on admission 3.0.  Today postoperatively creatinine 2.6.  BMP recheck at 5 PM.  Will discharge with patient's creatinine 2 or below.  Continue IV fluids  Left ureterolithiasis with associated left hydronephrosis and hydroureter   Urology consulted, appreciate recommendations.  Underwent cystoscopy and basket retrieval on 12/2.  Patient tolerated well.  Continue Flomax.  Pain control.  Hypercalcemia  Continue IV fluids.  Continue Cinacalcet.   This has been a chronic issue.     Resolved Problems    Chronic Conditions (reviewed and stable unless otherwise stated)  HFpEF not in acute exacerbation  Echo on 6/12/2024 with EF of 50 to 55%  Outpatient diuretic regimen includes Bumex 2 mg daily and finerenone 10 mg daily.  Hold diuretics.  Strict ins and outs. Daily weights. Low sodium diet. Fluid restriction.  Non-insulin-dependent type 2 diabetes mellitus  Hold oral meds. Carb controlled diet. Accuchecks. Low dose sliding scale.  Hyperlipidemia  Continue statin  Hypertension  Hold losartan due to LISA  Obstructive sleep apnea  Continue home CPAP      LDA: []CVC / []PICC / []Midline / []Turner / []Drains / []Mediport / [x]None  Antibiotics: None   Steroids: None   Labs (still needed?): [x]Yes / []No  IVF (still needed?): [x]Yes / []No    Level of care: []Step Down / [x]Med-Surg  Bed Status: [x]Inpatient / []Observation  Telemetry: []Yes / [x]No  PT/OT: []Yes / [x]No    DVT Prophylaxis: [] Lovenox / [x] Heparin / [] SCDs / [] Already on Systemic Anticoagulation / [] None   Code status: Full Code     Expected discharge date:  Tonight   Disposition: Home      ===================================================================    Chief Complaint: Abdominal pain, back pain   Subjective (past 24

## 2024-12-03 NOTE — CARE COORDINATION
Case Management Assessment Initial Evaluation    Date/Time of Evaluation: 12/3/2024 9:02 AM  Assessment Completed by: Gerald Parra RN    If patient is discharged prior to next notation, then this note serves as note for discharge by case management.    Patient Name: Clem Arizmendi                   YOB: 1955  Diagnosis: Ureterolithiasis [N20.1]                   Date / Time: 2024  7:56 AM  Location: [address] [place] [address]     Patient Admission Status: Observation   Readmission Risk Low 0-14, Mod 15-19), High > 20: Readmission Risk Score: 10.3    Current PCP: Michael Foy MD  Health Care Decision Makers:   Primary Decision Maker: Marianachet Arizmendi - Spouse - [phone]    Secondary Decision Maker: Eddie Ugo - Child - [phone]    Supplemental (Other) Decision Maker: UGOPA - Child - [phone]    Additional Case Management Notes: creat 2.6, ical 1.41, Na+132. IVF. Post-op care.     Procedures:  cysto, lithotripsy, with left stent    Imagin/2 CT abd: 8mm obstructing calculus in left ureter. Hydronephrosis and hydroureter.     Patient Goals/Plan/Treatment Preferences: Clem is from home with his wife. He is independent. No needs anticipated.     *Click[employer] assessment deferred d/t OBS status.

## 2024-12-04 VITALS
DIASTOLIC BLOOD PRESSURE: 96 MMHG | TEMPERATURE: 97.5 F | SYSTOLIC BLOOD PRESSURE: 157 MMHG | BODY MASS INDEX: 44.1 KG/M2 | HEART RATE: 54 BPM | OXYGEN SATURATION: 98 % | HEIGHT: 71 IN | WEIGHT: 315 LBS | RESPIRATION RATE: 15 BRPM

## 2024-12-04 LAB
ANION GAP SERPL CALC-SCNC: 12 MEQ/L (ref 8–16)
BUN SERPL-MCNC: 44 MG/DL (ref 7–22)
CALCIUM SERPL-MCNC: 11.1 MG/DL (ref 8.5–10.5)
CHLORIDE SERPL-SCNC: 97 MEQ/L (ref 98–111)
CO2 SERPL-SCNC: 23 MEQ/L (ref 23–33)
CREAT SERPL-MCNC: 2.2 MG/DL (ref 0.4–1.2)
GFR SERPL CREATININE-BSD FRML MDRD: 32 ML/MIN/1.73M2
GLUCOSE BLD STRIP.AUTO-MCNC: 151 MG/DL (ref 70–108)
GLUCOSE SERPL-MCNC: 157 MG/DL (ref 70–108)
POTASSIUM SERPL-SCNC: 4.1 MEQ/L (ref 3.5–5.2)
SODIUM SERPL-SCNC: 132 MEQ/L (ref 135–145)

## 2024-12-04 PROCEDURE — 82948 REAGENT STRIP/BLOOD GLUCOSE: CPT

## 2024-12-04 PROCEDURE — 80048 BASIC METABOLIC PNL TOTAL CA: CPT

## 2024-12-04 PROCEDURE — 6370000000 HC RX 637 (ALT 250 FOR IP): Performed by: UROLOGY

## 2024-12-04 PROCEDURE — 36415 COLL VENOUS BLD VENIPUNCTURE: CPT

## 2024-12-04 PROCEDURE — 2580000003 HC RX 258: Performed by: UROLOGY

## 2024-12-04 PROCEDURE — G0378 HOSPITAL OBSERVATION PER HR: HCPCS

## 2024-12-04 PROCEDURE — 99239 HOSP IP/OBS DSCHRG MGMT >30: CPT | Performed by: PHYSICIAN ASSISTANT

## 2024-12-04 RX ADMIN — SODIUM CHLORIDE, PRESERVATIVE FREE 10 ML: 5 INJECTION INTRAVENOUS at 08:20

## 2024-12-04 RX ADMIN — ALLOPURINOL 300 MG: 300 TABLET ORAL at 08:19

## 2024-12-04 RX ADMIN — TAMSULOSIN HYDROCHLORIDE 0.4 MG: 0.4 CAPSULE ORAL at 08:19

## 2024-12-04 RX ADMIN — CINACALCET HYDROCHLORIDE 30 MG: 30 TABLET, FILM COATED ORAL at 08:20

## 2024-12-04 NOTE — PLAN OF CARE
Problem: Chronic Conditions and Co-morbidities  Goal: Patient's chronic conditions and co-morbidity symptoms are monitored and maintained or improved  12/4/2024 0904 by Drea Putnam RN  Outcome: Completed  12/3/2024 2146 by Melody Ames RN  Outcome: Progressing     Problem: Discharge Planning  Goal: Discharge to home or other facility with appropriate resources  12/4/2024 0904 by Drea Putnam RN  Outcome: Completed  12/3/2024 2146 by Melody Ames RN  Outcome: Progressing     Problem: Pain  Goal: Verbalizes/displays adequate comfort level or baseline comfort level  12/4/2024 0904 by Drea Putnam RN  Outcome: Completed  12/3/2024 2146 by Melody Ames RN  Outcome: Progressing  Flowsheets (Taken 12/3/2024 2146)  Verbalizes/displays adequate comfort level or baseline comfort level:   Encourage patient to monitor pain and request assistance   Assess pain using appropriate pain scale     Problem: Safety - Adult  Goal: Free from fall injury  12/4/2024 0904 by Drea Putnam RN  Outcome: Completed  12/3/2024 2146 by Melody Ames RN  Outcome: Progressing   Care plan reviewed with patient. Patient verbalizes understanding of plan of care and contributes to goal setting.

## 2024-12-04 NOTE — PROGRESS NOTES
Discharge teaching and instructions for diagnosis/procedure of ureterolithiasis completed with patient using teachback method. AVS reviewed. Patient voiced understanding regarding prescriptions, follow up appointments, and care of self at home. Discharged ambulatory to  home with support per family

## 2024-12-04 NOTE — PLAN OF CARE
Problem: Chronic Conditions and Co-morbidities  Goal: Patient's chronic conditions and co-morbidity symptoms are monitored and maintained or improved  Outcome: Progressing     Problem: Discharge Planning  Goal: Discharge to home or other facility with appropriate resources  Outcome: Progressing     Problem: Pain  Goal: Verbalizes/displays adequate comfort level or baseline comfort level  Outcome: Progressing  Flowsheets (Taken 12/3/2024 2146)  Verbalizes/displays adequate comfort level or baseline comfort level:   Encourage patient to monitor pain and request assistance   Assess pain using appropriate pain scale     Problem: Safety - Adult  Goal: Free from fall injury  Outcome: Progressing

## 2024-12-05 LAB — STONE ANALYSIS: NORMAL

## 2024-12-05 NOTE — DISCHARGE SUMMARY
bowel sounds.  Musculoskeletal:  No clubbing, cyanosis or edema bilaterally.  Full range of motion without deformity.  Skin: Skin color, texture, turgor normal.  No rashes or lesions.  Neurologic:  Neurovascularly intact without any focal sensory/motor deficits. Cranial nerves: II-XII intact, grossly non-focal.  Psychiatric:  Alert and oriented, thought content appropriate, normal insight  Capillary Refill: Brisk,< 3 seconds   Peripheral Pulses: +2 palpable, equal bilaterally       Labs: For convenience and continuity at follow-up the following most recent labs are provided:      CBC:    Lab Results   Component Value Date/Time    WBC 8.7 12/03/2024 07:47 AM    HGB 14.9 12/03/2024 07:47 AM    HCT 45.8 12/03/2024 07:47 AM     12/03/2024 07:47 AM       Renal:    Lab Results   Component Value Date/Time     12/04/2024 05:38 AM    K 4.1 12/04/2024 05:38 AM    CL 97 12/04/2024 05:38 AM    CO2 23 12/04/2024 05:38 AM    BUN 44 12/04/2024 05:38 AM    CREATININE 2.2 12/04/2024 05:38 AM    CALCIUM 11.1 12/04/2024 05:38 AM    PHOS 2.7 12/13/2023 07:24 AM         Significant Diagnostic Studies    Radiology:   CT ABDOMEN PELVIS WO CONTRAST Additional Contrast? None   Final Result   1. An 8 mm obstructing calculus in the proximal to mid left ureter (series 301,   image 46) results in mild to moderate left hydronephrosis and hydroureter.   Nonobstructing left nephrolithiasis is also discussed.      2. Colonic diverticulosis without diverticulitis. Normal appendix. No bowel   obstruction.      3. Chronic findings are discussed.            **This report has been created using voice recognition software.  It may contain   minor errors which are inherent in voice recognition technology.**      Electronically signed by Dr. Milly Acuna             Consults:     IP CONSULT TO CASE MANAGEMENT  IP CONSULT TO SPIRITUAL SERVICES  IP CONSULT TO UROLOGY  IP CONSULT TO UROLOGY    Disposition:    [x] Home       [] TCU       []

## 2024-12-09 ENCOUNTER — HOSPITAL ENCOUNTER (OUTPATIENT)
Age: 69
Discharge: HOME | End: 2024-12-09
Payer: MEDICARE

## 2024-12-09 DIAGNOSIS — N17.9 AKI (ACUTE KIDNEY INJURY) (HCC): ICD-10-CM

## 2024-12-09 LAB
AMYLASE SERPL-CCNC: 83 U/L (ref 20–104)
ANION GAP SERPL CALC-SCNC: 14 MEQ/L (ref 8–16)
BUN SERPL-MCNC: 37 MG/DL (ref 7–22)
CALCIUM SERPL-MCNC: 11.2 MG/DL (ref 8.5–10.5)
CHLORIDE SERPL-SCNC: 101 MEQ/L (ref 98–111)
CO2 SERPL-SCNC: 24 MEQ/L (ref 23–33)
CREAT SERPL-MCNC: 2 MG/DL (ref 0.4–1.2)
GFR SERPL CREATININE-BSD FRML MDRD: 36 ML/MIN/1.73M2
GLUCOSE SERPL-MCNC: 94 MG/DL (ref 70–108)
LIPASE SERPL-CCNC: 91.6 U/L (ref 5.6–51.3)
POTASSIUM SERPL-SCNC: 3.9 MEQ/L (ref 3.5–5.2)
SODIUM SERPL-SCNC: 139 MEQ/L (ref 135–145)
T3FREE SERPL-MCNC: 3 PG/ML (ref 2–4.4)
TSH SERPL DL<=0.005 MIU/L-ACNC: 1.81 UIU/ML (ref 0.4–4.2)

## 2024-12-09 PROCEDURE — 84443 ASSAY THYROID STIM HORMONE: CPT

## 2024-12-09 PROCEDURE — 80048 BASIC METABOLIC PNL TOTAL CA: CPT

## 2024-12-09 PROCEDURE — 83690 ASSAY OF LIPASE: CPT

## 2024-12-09 PROCEDURE — 84481 FREE ASSAY (FT-3): CPT

## 2024-12-09 PROCEDURE — 82150 ASSAY OF AMYLASE: CPT

## 2024-12-09 PROCEDURE — 36415 COLL VENOUS BLD VENIPUNCTURE: CPT

## 2024-12-10 ENCOUNTER — TRANSCRIBE ORDERS (OUTPATIENT)
Dept: ADMINISTRATIVE | Age: 69
End: 2024-12-10

## 2024-12-10 DIAGNOSIS — Z87.442 PERSONAL HISTORY OF URINARY CALCULI: Primary | ICD-10-CM

## 2024-12-17 ENCOUNTER — HOSPITAL ENCOUNTER (OUTPATIENT)
Dept: ULTRASOUND IMAGING | Age: 69
Discharge: HOME | End: 2024-12-17
Attending: UROLOGY
Payer: MEDICARE

## 2024-12-17 DIAGNOSIS — Z87.442 PERSONAL HISTORY OF URINARY CALCULI: ICD-10-CM

## 2024-12-17 PROCEDURE — 76775 US EXAM ABDO BACK WALL LIM: CPT

## 2024-12-23 ENCOUNTER — HOSPITAL ENCOUNTER (OUTPATIENT)
Age: 69
Discharge: HOME | End: 2024-12-23
Payer: MEDICARE

## 2024-12-23 LAB
ANION GAP SERPL CALC-SCNC: 12 MEQ/L (ref 8–16)
BUN SERPL-MCNC: 25 MG/DL (ref 7–22)
CALCIUM SERPL-MCNC: 10.5 MG/DL (ref 8.5–10.5)
CHLORIDE SERPL-SCNC: 100 MEQ/L (ref 98–111)
CO2 SERPL-SCNC: 25 MEQ/L (ref 23–33)
CREAT SERPL-MCNC: 1.7 MG/DL (ref 0.4–1.2)
DEPRECATED MEAN GLUCOSE BLD GHB EST-ACNC: 141 MG/DL (ref 70–126)
GFR SERPL CREATININE-BSD FRML MDRD: 43 ML/MIN/1.73M2
GLUCOSE SERPL-MCNC: 65 MG/DL (ref 70–108)
HBA1C MFR BLD HPLC: 6.7 % (ref 4.4–6.4)
IONIZED CALCIUM, WHOLE BLOOD: 1.33 MMOL/L (ref 1.12–1.32)
POTASSIUM SERPL-SCNC: 4 MEQ/L (ref 3.5–5.2)
SODIUM SERPL-SCNC: 137 MEQ/L (ref 135–145)

## 2024-12-23 PROCEDURE — 36415 COLL VENOUS BLD VENIPUNCTURE: CPT

## 2024-12-23 PROCEDURE — 80048 BASIC METABOLIC PNL TOTAL CA: CPT

## 2024-12-23 PROCEDURE — 83036 HEMOGLOBIN GLYCOSYLATED A1C: CPT

## 2024-12-23 PROCEDURE — 82330 ASSAY OF CALCIUM: CPT

## 2025-01-13 ENCOUNTER — OFFICE VISIT (OUTPATIENT)
Age: 70
End: 2025-01-13
Payer: MEDICARE

## 2025-01-13 VITALS
DIASTOLIC BLOOD PRESSURE: 80 MMHG | WEIGHT: 315 LBS | BODY MASS INDEX: 44.1 KG/M2 | HEART RATE: 65 BPM | HEIGHT: 71 IN | SYSTOLIC BLOOD PRESSURE: 118 MMHG

## 2025-01-13 DIAGNOSIS — E21.3 HYPERPARATHYROIDISM (HCC): Primary | ICD-10-CM

## 2025-01-13 DIAGNOSIS — E11.65 TYPE 2 DIABETES MELLITUS WITH HYPERGLYCEMIA, WITHOUT LONG-TERM CURRENT USE OF INSULIN (HCC): ICD-10-CM

## 2025-01-13 DIAGNOSIS — E83.52 HYPERCALCEMIA: ICD-10-CM

## 2025-01-13 PROCEDURE — 1160F RVW MEDS BY RX/DR IN RCRD: CPT | Performed by: INTERNAL MEDICINE

## 2025-01-13 PROCEDURE — 3074F SYST BP LT 130 MM HG: CPT | Performed by: INTERNAL MEDICINE

## 2025-01-13 PROCEDURE — 1159F MED LIST DOCD IN RCRD: CPT | Performed by: INTERNAL MEDICINE

## 2025-01-13 PROCEDURE — 3046F HEMOGLOBIN A1C LEVEL >9.0%: CPT | Performed by: INTERNAL MEDICINE

## 2025-01-13 PROCEDURE — G8427 DOCREV CUR MEDS BY ELIG CLIN: HCPCS | Performed by: INTERNAL MEDICINE

## 2025-01-13 PROCEDURE — 2022F DILAT RTA XM EVC RTNOPTHY: CPT | Performed by: INTERNAL MEDICINE

## 2025-01-13 PROCEDURE — 1036F TOBACCO NON-USER: CPT | Performed by: INTERNAL MEDICINE

## 2025-01-13 PROCEDURE — 3017F COLORECTAL CA SCREEN DOC REV: CPT | Performed by: INTERNAL MEDICINE

## 2025-01-13 PROCEDURE — 1123F ACP DISCUSS/DSCN MKR DOCD: CPT | Performed by: INTERNAL MEDICINE

## 2025-01-13 PROCEDURE — 3079F DIAST BP 80-89 MM HG: CPT | Performed by: INTERNAL MEDICINE

## 2025-01-13 PROCEDURE — 99214 OFFICE O/P EST MOD 30 MIN: CPT | Performed by: INTERNAL MEDICINE

## 2025-01-13 PROCEDURE — G8417 CALC BMI ABV UP PARAM F/U: HCPCS | Performed by: INTERNAL MEDICINE

## 2025-01-13 RX ORDER — METFORMIN HYDROCHLORIDE 500 MG/1
500 TABLET, EXTENDED RELEASE ORAL 2 TIMES DAILY
COMMUNITY
Start: 2024-10-25

## 2025-01-13 RX ORDER — ALLOPURINOL 300 MG/1
300 TABLET ORAL DAILY
COMMUNITY
Start: 2024-12-12

## 2025-01-13 RX ORDER — GLIPIZIDE 5 MG/1
5 TABLET, FILM COATED, EXTENDED RELEASE ORAL DAILY
COMMUNITY
Start: 2024-11-19

## 2025-01-13 RX ORDER — SEMAGLUTIDE 0.68 MG/ML
0.5 INJECTION, SOLUTION SUBCUTANEOUS WEEKLY
COMMUNITY
Start: 2024-10-30

## 2025-01-13 RX ORDER — PRAVASTATIN SODIUM 20 MG
20 TABLET ORAL NIGHTLY
COMMUNITY
Start: 2024-11-19

## 2025-01-13 NOTE — ASSESSMENT & PLAN NOTE
Chronic, not at goal (unstable), complicated by kidney stones and chronic kidney disease.  Patient is requiring 60 mg of Cinacalcet in the morning and 30 mg in the afternoon.  I would strongly consider referring this patient to either [facility] or [facility] for reoperation.  His urinary calcium from 2022 was elevated and he has continued to have kidney stones.  With his diagnosis of chronic kidney disease, there would be some benefit in terms of renal stabilization following parathyroidectomy.  For now I have advised him to stay on the Cinacalcet, with close monitoring of both calcium and PTH levels.  I discussed these recommendations with his nephrologist Dr. Robb.  Maintain adequate oral hydration and weightbearing physical activity.  I am not going to order any labs at this point.  The patient has an appointment with her nephrologist in the second week of February and they will discuss these recommendations in detail.

## 2025-01-13 NOTE — ASSESSMENT & PLAN NOTE
Complicated by chronic kidney disease.  Patient is requiring multiple medications including Ozempic, glipizide and metformin.  I am concerned about the safety of metformin given his kidney disease so I recommend discontinuing this medication.  He appears to be tolerating Ozempic well and this can be continued with close monitoring of his electrolytes and renal function.  We discussed the option of transferring this patient to [facility] and clearly this can be done after his parathyroid condition is stabilized to avoid any delays.  The patient is also taking Farxiga 10 mg every other day but is not getting a lot of benefit from this medication in terms of glycemic control.  However for renal and cardiovascular indications, the medication can be continued but it is probably reasonable to change it to 5 mg daily.  This was discussed with his nephrologist.

## 2025-01-13 NOTE — PROGRESS NOTES
[facility] [place] [facility]  [facility] ENDOCRINOLOGY  [address]  [facility] [address]  Dept: [phone]  Loc: [phone]     Visit Date: 1/13/2025    Clem Arizmendi is a 69 y.o. male who presents today for:  Chief Complaint   Patient presents with    Follow-up     Hyperparathyroidism (HCC)            Subjective:      HPI     Clem Arizmendi is a 69 y.o. , male who comes for Follow up for primary hyperparathyroidism and type 2 diabetes mellitus.  PCP: Michael Foy MD  Clem Arizmendi is a 66 y.o. , male who comes for f/u for  hyperparathyroidism.  Referring provider: Michael Foy MD  This patient was diagnosed with hypercalcemia 3 years ago.  His disease has been complicated by kidney stones.  He also has chronic kidney disease.  DEXA scan from 2 years ago was normal.    The patient underwent parathyroidectomy on 7/28/2022 but unfortunately his PTH and calcium have remained high.  He has been placed on Cinacalcet 60 mg in the morning and 30 mg in the evening for hypercalcemia.  He has met with his nephrologist and now there is consideration for referral to a tertiary center for reoperation.    This patient also has type 2 diabetes mellitus.  He is now taking metformin 500 mg twice daily, glipizide 10/5 and Ozempic 0.5 mg weekly.  His A1c has improved.  Patient is tolerating medication well with no GI symptoms.  He tells me that at the last eye exam there was no evidence of retinopathy.  Vision has really not changed.  Reports no foot problems at this point.  I have not reviewed his blood sugar because he did not bring it along.  I have reviewed PTH from 7/18/2024.  I also reviewed A1c, BMP, ionized calcium from 12/9/2024.  Urinary calcium was 545 mg per 24 hours on 11/17/2021.  CT abdomen from 12/2/2024 showed an 8 mm obstructive calculus in the left ureter.  There was mild to moderate hydronephrosis and hydroureter.  Past Medical History:   Diagnosis Date

## 2025-01-31 ENCOUNTER — HOSPITAL ENCOUNTER (OUTPATIENT)
Age: 70
Discharge: HOME | End: 2025-01-31
Payer: MEDICARE

## 2025-01-31 LAB
ANION GAP SERPL CALC-SCNC: 12 MEQ/L (ref 8–16)
BACTERIA URNS QL MICRO: ABNORMAL /HPF
BILIRUB UR QL STRIP.AUTO: NEGATIVE
BUN SERPL-MCNC: 33 MG/DL (ref 7–22)
CALCIUM SERPL-MCNC: 10.1 MG/DL (ref 8.5–10.5)
CASTS #/AREA URNS LPF: ABNORMAL /LPF
CASTS 2: ABNORMAL /LPF
CHARACTER UR: CLEAR
CHLORIDE SERPL-SCNC: 103 MEQ/L (ref 98–111)
CO2 SERPL-SCNC: 25 MEQ/L (ref 23–33)
COLOR, UA: YELLOW
CREAT SERPL-MCNC: 1.9 MG/DL (ref 0.4–1.2)
CREAT UR-MCNC: 12.7 MG/DL
CRYSTALS URNS MICRO: ABNORMAL
DEPRECATED RDW RBC AUTO: 48 FL (ref 35–45)
EPITHELIAL CELLS, UA: ABNORMAL /HPF
ERYTHROCYTE [DISTWIDTH] IN BLOOD BY AUTOMATED COUNT: 15.1 % (ref 11.5–14.5)
GFR SERPL CREATININE-BSD FRML MDRD: 38 ML/MIN/1.73M2
GLUCOSE SERPL-MCNC: 89 MG/DL (ref 70–108)
GLUCOSE UR QL STRIP.AUTO: 250 MG/DL
HCT VFR BLD AUTO: 47.3 % (ref 42–52)
HGB BLD-MCNC: 15.3 GM/DL (ref 14–18)
HGB UR QL STRIP.AUTO: NEGATIVE
IONIZED CALCIUM, WHOLE BLOOD: 1.25 MMOL/L (ref 1.12–1.32)
KETONES UR QL STRIP.AUTO: NEGATIVE
MAGNESIUM SERPL-MCNC: 2.2 MG/DL (ref 1.6–2.4)
MCH RBC QN AUTO: 28.6 PG (ref 26–33)
MCHC RBC AUTO-ENTMCNC: 32.3 GM/DL (ref 32.2–35.5)
MCV RBC AUTO: 88.4 FL (ref 80–94)
MISCELLANEOUS 2: ABNORMAL
NITRITE UR QL STRIP: NEGATIVE
PH UR STRIP.AUTO: 7 [PH] (ref 5–9)
PHOSPHATE SERPL-MCNC: 2.9 MG/DL (ref 2.4–4.7)
PLATELET # BLD AUTO: 248 THOU/MM3 (ref 130–400)
PMV BLD AUTO: 10.2 FL (ref 9.4–12.4)
POTASSIUM SERPL-SCNC: 4 MEQ/L (ref 3.5–5.2)
PROT UR STRIP.AUTO-MCNC: ABNORMAL MG/DL
PROT UR-MCNC: 23.3 MG/DL
PROT/CREAT 24H UR: 1.83 MG/G{CREAT}
PTH-INTACT SERPL-MCNC: 80.5 PG/ML (ref 15–65)
RBC # BLD AUTO: 5.35 MILL/MM3 (ref 4.7–6.1)
RBC URINE: ABNORMAL /HPF
RENAL EPI CELLS #/AREA URNS HPF: ABNORMAL /[HPF]
SODIUM SERPL-SCNC: 140 MEQ/L (ref 135–145)
SP GR UR REFRACT.AUTO: 1.01 (ref 1–1.03)
UROBILINOGEN, URINE: 0.2 EU/DL (ref 0–1)
WBC # BLD AUTO: 7.8 THOU/MM3 (ref 4.8–10.8)
WBC #/AREA URNS HPF: ABNORMAL /HPF
WBC #/AREA URNS HPF: NEGATIVE /[HPF]
YEAST LIKE FUNGI URNS QL MICRO: ABNORMAL

## 2025-01-31 PROCEDURE — 36415 COLL VENOUS BLD VENIPUNCTURE: CPT

## 2025-01-31 PROCEDURE — 83970 ASSAY OF PARATHORMONE: CPT

## 2025-01-31 PROCEDURE — 84100 ASSAY OF PHOSPHORUS: CPT

## 2025-01-31 PROCEDURE — 84156 ASSAY OF PROTEIN URINE: CPT

## 2025-01-31 PROCEDURE — 80048 BASIC METABOLIC PNL TOTAL CA: CPT

## 2025-01-31 PROCEDURE — 85027 COMPLETE CBC AUTOMATED: CPT

## 2025-01-31 PROCEDURE — 82570 ASSAY OF URINE CREATININE: CPT

## 2025-01-31 PROCEDURE — 82330 ASSAY OF CALCIUM: CPT

## 2025-01-31 PROCEDURE — 81001 URINALYSIS AUTO W/SCOPE: CPT

## 2025-01-31 PROCEDURE — 83735 ASSAY OF MAGNESIUM: CPT

## 2025-03-13 ENCOUNTER — HOSPITAL ENCOUNTER (OUTPATIENT)
Dept: GENERAL RADIOLOGY | Age: 70
Discharge: HOME | End: 2025-03-13
Payer: MEDICARE

## 2025-03-13 ENCOUNTER — HOSPITAL ENCOUNTER (OUTPATIENT)
Age: 70
Discharge: HOME | End: 2025-03-13
Payer: MEDICARE

## 2025-03-13 DIAGNOSIS — R05.1 ACUTE COUGH: ICD-10-CM

## 2025-03-13 DIAGNOSIS — R06.02 SHORTNESS OF BREATH: ICD-10-CM

## 2025-03-13 LAB
ANION GAP SERPL CALC-SCNC: 12 MEQ/L (ref 8–16)
BASOPHILS ABSOLUTE: 0.1 THOU/MM3 (ref 0–0.1)
BASOPHILS NFR BLD AUTO: 1 %
BUN SERPL-MCNC: 30 MG/DL (ref 8–23)
CALCIUM SERPL-MCNC: 10.2 MG/DL (ref 8.8–10.2)
CHLORIDE SERPL-SCNC: 103 MEQ/L (ref 98–111)
CO2 SERPL-SCNC: 24 MEQ/L (ref 22–29)
CREAT SERPL-MCNC: 1.8 MG/DL (ref 0.7–1.2)
DEPRECATED RDW RBC AUTO: 45.3 FL (ref 35–45)
EOSINOPHIL NFR BLD AUTO: 2.9 %
EOSINOPHILS ABSOLUTE: 0.2 THOU/MM3 (ref 0–0.4)
ERYTHROCYTE [DISTWIDTH] IN BLOOD BY AUTOMATED COUNT: 14.4 % (ref 11.5–14.5)
GFR SERPL CREATININE-BSD FRML MDRD: 40 ML/MIN/1.73M2
GLUCOSE SERPL-MCNC: 86 MG/DL (ref 74–109)
HCT VFR BLD AUTO: 45.7 % (ref 42–52)
HGB BLD-MCNC: 15.2 GM/DL (ref 14–18)
IMM GRANULOCYTES # BLD AUTO: 0.24 THOU/MM3 (ref 0–0.07)
IMM GRANULOCYTES NFR BLD AUTO: 2.9 %
IONIZED CALCIUM, WHOLE BLOOD: 1.23 MMOL/L (ref 1.12–1.32)
LYMPHOCYTES ABSOLUTE: 2 THOU/MM3 (ref 1–4.8)
LYMPHOCYTES NFR BLD AUTO: 23.6 %
MCH RBC QN AUTO: 28.8 PG (ref 26–33)
MCHC RBC AUTO-ENTMCNC: 33.3 GM/DL (ref 32.2–35.5)
MCV RBC AUTO: 86.6 FL (ref 80–94)
MONOCYTES ABSOLUTE: 0.7 THOU/MM3 (ref 0.4–1.3)
MONOCYTES NFR BLD AUTO: 8.7 %
NEUTROPHILS ABSOLUTE: 5.1 THOU/MM3 (ref 1.8–7.7)
NEUTROPHILS NFR BLD AUTO: 60.9 %
NRBC BLD AUTO-RTO: 0 /100 WBC
PLATELET # BLD AUTO: 303 THOU/MM3 (ref 130–400)
PMV BLD AUTO: 9.9 FL (ref 9.4–12.4)
POTASSIUM SERPL-SCNC: 4.1 MEQ/L (ref 3.5–5.2)
RBC # BLD AUTO: 5.28 MILL/MM3 (ref 4.7–6.1)
SODIUM SERPL-SCNC: 139 MEQ/L (ref 135–145)
WBC # BLD AUTO: 8.4 THOU/MM3 (ref 4.8–10.8)

## 2025-03-13 PROCEDURE — 85025 COMPLETE CBC W/AUTO DIFF WBC: CPT

## 2025-03-13 PROCEDURE — 36415 COLL VENOUS BLD VENIPUNCTURE: CPT

## 2025-03-13 PROCEDURE — 80048 BASIC METABOLIC PNL TOTAL CA: CPT

## 2025-03-13 PROCEDURE — 82330 ASSAY OF CALCIUM: CPT

## 2025-03-13 PROCEDURE — 71046 X-RAY EXAM CHEST 2 VIEWS: CPT

## 2025-04-02 ENCOUNTER — TRANSCRIBE ORDERS (OUTPATIENT)
Dept: ADMINISTRATIVE | Age: 70
End: 2025-04-02

## 2025-04-02 DIAGNOSIS — E21.3 HYPERPARATHYROIDISM: Primary | ICD-10-CM

## 2025-04-16 ENCOUNTER — HOSPITAL ENCOUNTER (OUTPATIENT)
Dept: NUCLEAR MEDICINE | Age: 70
Discharge: HOME | End: 2025-04-16
Payer: MEDICARE

## 2025-04-16 ENCOUNTER — HOSPITAL ENCOUNTER (OUTPATIENT)
Age: 70
Discharge: HOME | End: 2025-04-16
Payer: MEDICARE

## 2025-04-16 ENCOUNTER — HOSPITAL ENCOUNTER (OUTPATIENT)
Dept: WOMENS IMAGING | Age: 70
Discharge: HOME | End: 2025-04-16
Payer: MEDICARE

## 2025-04-16 DIAGNOSIS — E21.3 HYPERPARATHYROIDISM: ICD-10-CM

## 2025-04-16 LAB
25(OH)D3 SERPL-MCNC: 57 NG/ML (ref 30–100)
CA-I BLD ISE-SCNC: 1.37 MMOL/L (ref 1.12–1.32)
CALCIUM 24H UR-MRATE: 350 MG/24HR (ref 100–240)
CALCIUM SERPL-MCNC: 10.7 MG/DL (ref 8.8–10.2)
CALCIUM UR-MCNC: 7 MG/DL
CREAT 24H UR-MRATE: 1.7 GM/24HR (ref 1–2.4)
CREAT UR-MCNC: 34.1 MG/DL
HOURS COLLECTED: 24 HRS
HOURS COLLECTED: 24 HRS
PHOSPHATE SERPL-MCNC: 3.2 MG/DL (ref 2.5–4.5)
PTH-INTACT SERPL-MCNC: 115 PG/ML (ref 15–65)
URINE VOLUME MEASURE: 4995 ML
URINE VOLUME, 24 HOUR: 4995 ML

## 2025-04-16 PROCEDURE — 78071 PARATHYRD PLANAR W/WO SUBTRJ: CPT

## 2025-04-16 PROCEDURE — 36415 COLL VENOUS BLD VENIPUNCTURE: CPT

## 2025-04-16 PROCEDURE — 84100 ASSAY OF PHOSPHORUS: CPT

## 2025-04-16 PROCEDURE — A9500 TC99M SESTAMIBI: HCPCS | Performed by: SURGERY

## 2025-04-16 PROCEDURE — 77081 DXA BONE DENSITY APPENDICULR: CPT

## 2025-04-16 PROCEDURE — 77080 DXA BONE DENSITY AXIAL: CPT

## 2025-04-16 PROCEDURE — 82330 ASSAY OF CALCIUM: CPT

## 2025-04-16 PROCEDURE — 82570 ASSAY OF URINE CREATININE: CPT

## 2025-04-16 PROCEDURE — 83970 ASSAY OF PARATHORMONE: CPT

## 2025-04-16 PROCEDURE — 82306 VITAMIN D 25 HYDROXY: CPT

## 2025-04-16 PROCEDURE — 82652 VIT D 1 25-DIHYDROXY: CPT

## 2025-04-16 PROCEDURE — 78072 PARATHYRD PLANAR W/SPECT&CT: CPT

## 2025-04-16 PROCEDURE — 82340 ASSAY OF CALCIUM IN URINE: CPT

## 2025-04-16 PROCEDURE — 82310 ASSAY OF CALCIUM: CPT

## 2025-04-16 PROCEDURE — 3430000000 HC RX DIAGNOSTIC RADIOPHARMACEUTICAL: Performed by: SURGERY

## 2025-04-16 RX ORDER — TETRAKIS(2-METHOXYISOBUTYLISOCYANIDE)COPPER(I) TETRAFLUOROBORATE 1 MG/ML
25.7 INJECTION, POWDER, LYOPHILIZED, FOR SOLUTION INTRAVENOUS
Status: COMPLETED | OUTPATIENT
Start: 2025-04-16 | End: 2025-04-16

## 2025-04-16 RX ADMIN — Medication 25.7 MILLICURIE: at 09:38

## 2025-04-18 LAB — 1,25(OH)2D SERPL-MCNC: 26.1 PG/ML (ref 19.9–79.3)

## 2025-07-23 ENCOUNTER — HOSPITAL ENCOUNTER (OUTPATIENT)
Age: 70
Discharge: HOME | End: 2025-07-23
Payer: MEDICARE

## 2025-07-23 LAB
25(OH)D3 SERPL-MCNC: 50 NG/ML (ref 30–100)
ALBUMIN SERPL BCG-MCNC: 4.2 G/DL (ref 3.4–4.9)
ALP SERPL-CCNC: 121 U/L (ref 40–129)
ALT SERPL W/O P-5'-P-CCNC: 38 U/L (ref 10–50)
ANION GAP SERPL CALC-SCNC: 15 MEQ/L (ref 8–16)
AST SERPL-CCNC: 27 U/L (ref 10–50)
BACTERIA URNS QL MICRO: ABNORMAL /HPF
BILIRUB SERPL-MCNC: 0.5 MG/DL (ref 0.3–1.2)
BILIRUB UR QL STRIP.AUTO: NEGATIVE
BUN SERPL-MCNC: 37 MG/DL (ref 8–23)
CALCIUM SERPL-MCNC: 10 MG/DL (ref 8.8–10.2)
CASTS #/AREA URNS LPF: ABNORMAL /LPF
CASTS 2: ABNORMAL /LPF
CHARACTER UR: CLEAR
CHLORIDE SERPL-SCNC: 103 MEQ/L (ref 98–111)
CHOLEST SERPL-MCNC: 148 MG/DL (ref 100–199)
CO2 SERPL-SCNC: 21 MEQ/L (ref 22–29)
COLOR, UA: YELLOW
CREAT SERPL-MCNC: 1.7 MG/DL (ref 0.7–1.2)
CREAT UR-MCNC: 24.8 MG/DL
CRYSTALS URNS MICRO: ABNORMAL
DEPRECATED MEAN GLUCOSE BLD GHB EST-ACNC: 138 MG/DL (ref 70–126)
DEPRECATED RDW RBC AUTO: 44.9 FL (ref 35–45)
EPITHELIAL CELLS, UA: ABNORMAL /HPF
ERYTHROCYTE [DISTWIDTH] IN BLOOD BY AUTOMATED COUNT: 14.1 % (ref 11.5–14.5)
GFR SERPL CREATININE-BSD FRML MDRD: 43 ML/MIN/1.73M2
GLUCOSE SERPL-MCNC: 143 MG/DL (ref 74–109)
GLUCOSE UR QL STRIP.AUTO: 500 MG/DL
HBA1C MFR BLD HPLC: 6.6 % (ref 4–6)
HCT VFR BLD AUTO: 47.3 % (ref 42–52)
HDLC SERPL-MCNC: 36 MG/DL
HGB BLD-MCNC: 15.6 GM/DL (ref 14–18)
HGB UR QL STRIP.AUTO: NEGATIVE
IONIZED CALCIUM, WHOLE BLOOD: 1.3 MMOL/L (ref 1.12–1.32)
KETONES UR QL STRIP.AUTO: NEGATIVE
LDLC SERPL CALC-MCNC: 76 MG/DL
MAGNESIUM SERPL-MCNC: 2.5 MG/DL (ref 1.6–2.6)
MCH RBC QN AUTO: 28.9 PG (ref 26–33)
MCHC RBC AUTO-ENTMCNC: 33 GM/DL (ref 32.2–35.5)
MCV RBC AUTO: 87.8 FL (ref 80–94)
MISCELLANEOUS 2: ABNORMAL
NITRITE UR QL STRIP: NEGATIVE
PH UR STRIP.AUTO: 5.5 [PH] (ref 5–9)
PHOSPHATE SERPL-MCNC: 3.7 MG/DL (ref 2.5–4.5)
PLATELET # BLD AUTO: 241 THOU/MM3 (ref 130–400)
PMV BLD AUTO: 10.9 FL (ref 9.4–12.4)
POTASSIUM SERPL-SCNC: 3.9 MEQ/L (ref 3.5–5.2)
PROT SERPL-MCNC: 7.2 G/DL (ref 6.4–8.3)
PROT UR STRIP.AUTO-MCNC: 30 MG/DL
PROT UR-MCNC: 25.8 MG/DL
PROT/CREAT 24H UR: 1.04 MG/G{CREAT}
PSA SERPL-MCNC: 1.13 NG/ML (ref 0–1)
PTH-INTACT SERPL-MCNC: 51 PG/ML (ref 15–65)
RBC # BLD AUTO: 5.39 MILL/MM3 (ref 4.7–6.1)
RBC URINE: ABNORMAL /HPF
RENAL EPI CELLS #/AREA URNS HPF: ABNORMAL /[HPF]
SODIUM SERPL-SCNC: 139 MEQ/L (ref 135–145)
SP GR UR REFRACT.AUTO: 1.01 (ref 1–1.03)
TRIGL SERPL-MCNC: 179 MG/DL (ref 0–199)
URATE SERPL-MCNC: 7.6 MG/DL (ref 3.7–7)
UROBILINOGEN, URINE: 0.2 EU/DL (ref 0–1)
WBC # BLD AUTO: 9.5 THOU/MM3 (ref 4.8–10.8)
WBC #/AREA URNS HPF: ABNORMAL /HPF
WBC #/AREA URNS HPF: ABNORMAL /[HPF]
YEAST LIKE FUNGI URNS QL MICRO: ABNORMAL

## 2025-07-23 PROCEDURE — 82306 VITAMIN D 25 HYDROXY: CPT

## 2025-07-23 PROCEDURE — 83036 HEMOGLOBIN GLYCOSYLATED A1C: CPT

## 2025-07-23 PROCEDURE — 83970 ASSAY OF PARATHORMONE: CPT

## 2025-07-23 PROCEDURE — 85027 COMPLETE CBC AUTOMATED: CPT

## 2025-07-23 PROCEDURE — 87086 URINE CULTURE/COLONY COUNT: CPT

## 2025-07-23 PROCEDURE — 82330 ASSAY OF CALCIUM: CPT

## 2025-07-23 PROCEDURE — 84550 ASSAY OF BLOOD/URIC ACID: CPT

## 2025-07-23 PROCEDURE — 80061 LIPID PANEL: CPT

## 2025-07-23 PROCEDURE — 82570 ASSAY OF URINE CREATININE: CPT

## 2025-07-23 PROCEDURE — 84100 ASSAY OF PHOSPHORUS: CPT

## 2025-07-23 PROCEDURE — 81001 URINALYSIS AUTO W/SCOPE: CPT

## 2025-07-23 PROCEDURE — 83735 ASSAY OF MAGNESIUM: CPT

## 2025-07-23 PROCEDURE — 84156 ASSAY OF PROTEIN URINE: CPT

## 2025-07-23 PROCEDURE — G0103 PSA SCREENING: HCPCS

## 2025-07-23 PROCEDURE — 80053 COMPREHEN METABOLIC PANEL: CPT

## 2025-07-23 PROCEDURE — 36415 COLL VENOUS BLD VENIPUNCTURE: CPT

## 2025-07-24 LAB
BACTERIA UR CULT: ABNORMAL
ORGANISM: ABNORMAL

## 2025-08-07 ENCOUNTER — HOSPITAL ENCOUNTER (OUTPATIENT)
Age: 70
Discharge: HOME | End: 2025-08-07
Payer: MEDICARE

## 2025-08-07 ENCOUNTER — HOSPITAL ENCOUNTER (OUTPATIENT)
Dept: GENERAL RADIOLOGY | Age: 70
Discharge: HOME | End: 2025-08-07
Payer: MEDICARE

## 2025-08-07 DIAGNOSIS — Z87.442 PERSONAL HISTORY OF URINARY CALCULI: ICD-10-CM

## 2025-08-07 PROCEDURE — 74018 RADEX ABDOMEN 1 VIEW: CPT

## (undated) DEVICE — EVACUATOR SURG 100CC SIL BLB SUCT RESVR FOR CLS WND DRNGE

## (undated) DEVICE — CORD,CAUTERY,BIPOLAR,STERILE: Brand: MEDLINE

## (undated) DEVICE — SOLUTION IRRIG 3000ML 0.9% SOD CHL USP UROMATIC PLAS CONT

## (undated) DEVICE — APPLIER CLP L9.375IN APER 2.1MM CLS L3.8MM 20 SM TI CLP

## (undated) DEVICE — DRAPE,EENT,SPLIT,STERILE: Brand: MEDLINE

## (undated) DEVICE — PROBE 8225101 5PK STD PRASS FL TIP ROHS

## (undated) DEVICE — CYSTO PACK: Brand: MEDLINE INDUSTRIES, INC.

## (undated) DEVICE — SPONGE,PEANUT,XRAY,ST,SM,3/8",5/CARD: Brand: MEDLINE INDUSTRIES, INC.

## (undated) DEVICE — FIBER LASER HOLM DISP SU200RT] LEONI FIBER OPTICS INC]

## (undated) DEVICE — 4-PORT MANIFOLD: Brand: NEPTUNE 2

## (undated) DEVICE — SNARE POLYP SM W13MMXL240CM SHTH DIA2.4MM OVL FLX DISP

## (undated) DEVICE — TRAP POLYP ETRAP

## (undated) DEVICE — SUTURE VCRL SZ 3-0 L18IN ABSRB VLT L26MM SH 1/2 CIR J774D

## (undated) DEVICE — KIT INF CTRL 2OZ LUB TBNG L12FT DBL END BRSH SYR OP4

## (undated) DEVICE — SINGLE-USE DIGITAL FLEXIBLE URETEROSCOPE: Brand: LITHOVUE

## (undated) DEVICE — DRAIN,WOUND,15FR,3/16,FULL-FLUTED: Brand: MEDLINE

## (undated) DEVICE — ADHESIVE SKIN CLSR 0.7ML TOP DERMBND ADV

## (undated) DEVICE — URETERAL ACCESS SHEATH SET: Brand: NAVIGATOR HD

## (undated) DEVICE — SOLUTION IV IRRIG WATER 500ML POUR BRL ST 2F7113

## (undated) DEVICE — ADAPTER URETSCP Y TYP ROT M LUER CONN TUOHY BORST VLV BLU

## (undated) DEVICE — GUIDEWIRE ENDOSCP L150CM DIA0.035IN TIP 3CM PTFE NIT

## (undated) DEVICE — BIOGUARD A/W CLEANING ADAPTER

## (undated) DEVICE — NITINOL STONE RETRIEVAL BASKET: Brand: ZERO TIP

## (undated) DEVICE — DISSECTOR ENDOSCP L21CM TIP CURVATURE 40DEG FN CRV JAW VES

## (undated) DEVICE — SET LNR RED GRN W/ BASE CLEANASCOPE

## (undated) DEVICE — GLOVE SURG SZ 75 L12IN FNGR THK94MIL TRNSLUC YEL LTX

## (undated) DEVICE — SUTURE MCRYL SZ 3-0 L27IN ABSRB UD L26MM SH 1/2 CIR Y416H

## (undated) DEVICE — FISH HOOKS, W/SUTURE, RUBBER BAND BLUNT, BARBLESS: Brand: DEROYAL

## (undated) DEVICE — SUTURE MCRYL + SZ 3-0 L27IN ABSRB UD PS1 L24MM 3/8 CIR REV MCP936H

## (undated) DEVICE — YANKAUER,BULB TIP,W/O VENT,RIGID,STERILE: Brand: MEDLINE